# Patient Record
Sex: FEMALE | Race: WHITE | HISPANIC OR LATINO | Employment: FULL TIME | URBAN - METROPOLITAN AREA
[De-identification: names, ages, dates, MRNs, and addresses within clinical notes are randomized per-mention and may not be internally consistent; named-entity substitution may affect disease eponyms.]

---

## 2024-02-06 ENCOUNTER — TRANSCRIBE ORDERS (OUTPATIENT)
Dept: ADMINISTRATIVE | Facility: HOSPITAL | Age: 54
End: 2024-02-06
Payer: COMMERCIAL

## 2024-02-06 ENCOUNTER — HOSPITAL ENCOUNTER (OUTPATIENT)
Dept: GENERAL RADIOLOGY | Facility: HOSPITAL | Age: 54
Discharge: HOME | End: 2024-02-06
Payer: OTHER MISCELLANEOUS

## 2024-02-06 DIAGNOSIS — S63.502A SPRAIN OF FOREARM, LEFT, INITIAL ENCOUNTER: ICD-10-CM

## 2024-02-06 DIAGNOSIS — L08.9 ABRASION OF FOOT, LEFT, INFECTED, INITIAL ENCOUNTER: ICD-10-CM

## 2024-02-06 DIAGNOSIS — M17.12 ARTHRITIS OF LEFT KNEE: ICD-10-CM

## 2024-02-06 DIAGNOSIS — M50.30 DEGENERATION OF CERVICAL INTERVERTEBRAL DISC: ICD-10-CM

## 2024-02-06 DIAGNOSIS — S83.92XA SPRAIN OF LEFT KNEE, INITIAL ENCOUNTER: Primary | ICD-10-CM

## 2024-02-06 DIAGNOSIS — S90.32XA CONTUSION OF LEFT FOOT, INITIAL ENCOUNTER: ICD-10-CM

## 2024-02-06 DIAGNOSIS — S90.812A ABRASION OF FOOT, LEFT, INFECTED, INITIAL ENCOUNTER: ICD-10-CM
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
## 2024-02-06 DIAGNOSIS — S83.92XA SPRAIN OF LEFT KNEE, INITIAL ENCOUNTER: ICD-10-CM

## 2024-02-06 DIAGNOSIS — S60.222A CONTUSION OF LEFT HAND, INITIAL ENCOUNTER: ICD-10-CM

## 2024-02-06 PROCEDURE — 73562 X-RAY EXAM OF KNEE 3: CPT

## 2024-02-06 PROCEDURE — 73110 X-RAY EXAM OF WRIST: CPT

## 2024-02-06 PROCEDURE — 73630 X-RAY EXAM OF FOOT: CPT

## 2024-02-06 PROCEDURE — 72050 X-RAY EXAM NECK SPINE 4/5VWS: CPT

## 2024-02-06 PROCEDURE — 73130 X-RAY EXAM OF HAND: CPT

## 2024-02-06 PROCEDURE — 73610 X-RAY EXAM OF ANKLE: CPT

## 2024-04-29 ENCOUNTER — HOSPITAL ENCOUNTER (OUTPATIENT)
Dept: PHYSICAL THERAPY | Facility: HOSPITAL | Age: 54
Setting detail: THERAPIES SERIES
Discharge: HOME | End: 2024-04-29
Payer: OTHER MISCELLANEOUS

## 2024-04-29 DIAGNOSIS — M25.562 LEFT KNEE PAIN, UNSPECIFIED CHRONICITY: ICD-10-CM

## 2024-04-29 DIAGNOSIS — M54.2 NECK PAIN: Primary | ICD-10-CM

## 2024-04-29 PROCEDURE — 97110 THERAPEUTIC EXERCISES: CPT

## 2024-04-29 PROCEDURE — 97161 PT EVAL LOW COMPLEX 20 MIN: CPT

## 2024-04-29 NOTE — THERAPY EVALUATION
Outpatient Physical Therapy Ortho Initial Evaluation  ALEXANDER Montenegro     Patient Name: Jocelyn Ríos  : 1970  MRN: [id number]  Today's Date: 2024      Visit Date: 2024    Patient Active Problem List   Diagnosis    Anxiety    Moderate persistent asthma without complication    Environmental allergies    Routine adult health maintenance    Complex regional pain syndrome i of left lower limb    Carpal tunnel syndrome on both sides    Status post arthroscopic surgery of left knee, DOS 10/21/2019    Intractable neuropathic pain of left knee    Chronic cervical pain    Chondromalacia of knee, left    Screening mammogram for breast cancer    Moderate mixed hyperlipidemia not requiring statin therapy        Past Medical History:   Diagnosis Date    Acute sinus infection     on poab    Anxiety     Arthritis     Asthma     STARTED @ 41 YRS OLD, DR. BUENO, & hospitals ALLERGY DR. LOPEZ    COVID-19     2021    CRPS (complex regional pain syndrome) type I of lower limb     Elevated cholesterol     Frequent UTI     GERD (gastroesophageal reflux disease)     History of 2019 novel coronavirus disease (COVID-19)     Iliotibial band syndrome affecting left lower leg     Itching     BLE has some scratches to notify MD if doesn't clear up prior to surgery    Kidney stones     Lateral collateral ligament deficiency of left knee 2016    Lateral meniscal tear     Lumbar paraspinal muscle spasm 2017    Meniscal cyst, left 2019    Migraines     Patellofemoral pain syndrome of left knee 2019    PONV (postoperative nausea and vomiting)     Seasonal allergies     Subchondral insufficiency fracture of femoral condyle, left, initial encounter 01/10/2020    Tear of lateral meniscus of left knee, current 2019        Past Surgical History:   Procedure Laterality Date     SECTION      x2    DIAGNOSTIC LAPAROSCOPY      FOOT SURGERY Right     HERNIA REPAIR Bilateral      inguinal hernia age 6    INTRAUTERINE DEVICE INSERTION      KNEE ARTHROSCOPY Left 10/21/2016    Procedure: KNEE ARTHROSCOPY debridement of lateral meniscal tear;  Surgeon: Son Mojica MD;  Location:  [facility];  Service:     KNEE ARTHROSCOPY Left 7/27/2018    Procedure: KNEE ARTHROSCOPY;  Surgeon: Son Mojica MD;  Location:  [facility];  Service: Orthopedics    KNEE ARTHROSCOPY Left 12/2/2021    Procedure: LEFT KNEE DIAGNOSTIC  ARTHROSCOPY AND PARTLATERAL MENISECTOMY AND REMOVAL OF MULTIPLE FIXATION DEVICES;  Surgeon: Rodríguez Hamlin MD;  Location:  [facility];  Service: Orthopedics;  Laterality: Left;    KNEE MENISCAL REPAIR Left 10/21/2019    Procedure: knee arthroscopy, debridement of meniscal cyst, and lateral meniscal repair;  Surgeon: Bridger Figueroa MD;  Location:  [facility];  Service: Orthopedics    NERVE BLOCK Left 5/25/2021    Procedure: KNEE GENICULAR NERVE BLOCK UNDER FLUORO- x2, 3-4 wks apart;  Surgeon: James Spivey MD;  Location: [facility] OR;  Service: Pain Management;  Laterality: Left;       Visit Dx:     ICD-10-CM ICD-9-CM   1. Neck pain  M54.2 723.1   2. Left knee pain, unspecified chronicity  M25.562 719.46          Patient History       Row Name 04/29/24 0900             History    Chief Complaint Difficulty Walking;Difficulty with daily activities;Headache;Numbness;Tinglings;Tightness;Pain;Muscle tenderness;Muscle weakness;Joint swelling  -LN      Type of Pain Knee pain;Neck pain  L knee and L>R neck  -LN      Date Current Problem(s) Began --  1 month ago  -LN      Brief Description of Current Complaint Pt fell at work (turned and slipped on water on floor) about 1 month ago. Pt reports she landed on L knee with knee completely bent under her and she had immediate pain in L knee and it swelled upt. 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 Pt with hx of 4 surgeries L knee= knee scope on 10/21/16, 7/27/18, 10/21/19 (lateral menisical repair), & 12/2/21 (L lateral  "menisectomy and removal of anchors from prior repair). Pt reports hx of a fall in the shower and she hit wall of shower with L side of her head and injured her neck; saw Chiropractor for 2 years and reports most benefit was from mechanical ICTX. She reports her neck had been doing really good until recent fall.  -LN      Previous treatment for THIS PROBLEM Injections;Medication;Surgery;Rehabilitation;Chiropractor  Chiropractor in past for neck x 2 years; reported most relief with mechanical ICTX.  -LN      Surgery Date: 12/02/21  most recent knee surgery- L knee menisectomy and anchors removed from prior repair.  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  -LN      Patient seeing anyone else for problem(s)? Dr. Rodriguez  -LN      How has patient tried to help current problem? ice/MH/Ibuprofen; rest  -LN      What clinical tests have you had for this problem? X-ray;MRI  -LN      Results of Clinical Tests X-ray of neck- Evidence for degenerative change around the C5-6 level; An acute osseous abnormality is not appreciated.  2.Evidence for some degenerative change as noted above.X-ray of L knee= There is no fracture. There is no large joint effusion. No concerning sclerotic or lytic lesions are seen. There is some minimal osteoarthritis. MRI of neck in 2023= At C2-C3, no significant disc degeneration. Mild facet arthropathy. No spinal canal or foraminal compromise.     At C3-C4, no significant disc degeneration. No facet arthropathy. " No spinal canal or foraminal compromise.     At C4-C5, disc desiccation and mild loss of disc height. Mild effacement of the ventral thecal sac. Mild narrowing of the foramina bilaterally. Moderate left facet arthropathy and mild right facet arthropathy.     At C5-C6, disc desiccation and mild loss of disc height. Mild effacement of the ventral thecal sac. Mild facet arthropathy. Mild bilateral foraminal narrowing.     At C6-C7, disc desiccation and mild loss of disc height. Mild facet arthropathy. No spinal canal or foraminal narrowing.     At C7-T1, no significant disc degeneration. Mild facet arthropathy. No spinal canal or foraminal narrowing.     Paravertebral soft tissues demonstrate no acute findings.  -LN      Related/Recent Hospitalizations No  -LN      History of Previous Related Injuries yes- hx of 4 knee surgeries and had a neck injury from a fall in shower 2 years ago.  -LN         Pain     Pain Location Knee;Neck  L knee and neck, L>R side  -LN      Pain at Present 8  -LN      Pain at Best 5  -LN      Pain at Worst 10  -LN      Pain Frequency Constant/continuous  -LN      Pain Description Sharp;Throbbing;Pressure;Aching  occasional sharp pain in L knee  -LN      What Performance Factors Make the Current Problem(s) WORSE? sitting for a long time= neck pain; lifting things; walking and standing; lying down; straightening left knee  -LN      What Performance Factors Make the Current Problem(s) BETTER? ice and heat; Ibuprofen; elevating L arm and resting it on something will help relieve neck pain; bending knee helps relieve neck pain  -LN      Tolerance Time- Standing limited due to L knee pain  -LN      Tolerance Time- Sitting limited; sitting too long causes increased neck pain  -LN      Tolerance Time- Walking limited due to L knee pain  -LN      Is your sleep disturbed? Yes  -LN      What position do you sleep in? Right sidelying;Left sidelying  mostly on R side now  -LN      Difficulties at work?  "still working; reports no issues with work activities- \"I just take my time.\"  -LN      Difficulties with recreational activities? yes; she used to walk 1/5 miles with her dog; but can't do anymore since fall.  -LN         Fall Risk Assessment    Any falls in the past year: Yes  -LN      Number of falls reported in the last 12 months 1  -LN      Factors that contributed to the fall: Slippery surface;Other (comment)  water on floor at work  -LN         Services    Prior Rehab/Home Health Experiences Yes  -LN      When was the prior experience with Rehab/Home Health 2019 and 2021 after L knee surgeries  -LN      Where was the prior experience with Rehab/Home Health [facility]  -LN      Are you currently receiving Home Health services No  -LN      Do you plan to receive Home Health services in the near future No  -LN         Daily Activities    Primary Language English  -LN      Are you able to read Yes  -LN      Are you able to write Yes  -LN      How does patient learn best? Other (comment)  not stated  -LN      Teaching needs identified Home Exercise Program;Other (comment)  Risks and benefits of treatment explained to pt.  -LN      Patient is concerned about/has problems with Climbing Stairs;Performing home management (household chores, shopping, care of dependents);Performing job responsibilities/community activities (work, school,;Transfers (getting out of a chair, bed);Walking;Standing;Bed Mobility;Difficulty with self care (i.e. bathing, dressing, toileting:;Flexibility;Grasping objects lifting;Performing sports, recreation, and play activities;Sitting  -LN      Does patient have problems with the following? Anxiety  -LN      Barriers to learning None  -LN      Pt Participated in POC and Goals Yes  -LN         Safety    Are you being hurt, hit, or frightened by anyone at home or in your life? No  -LN      Are you being neglected by a caregiver No  -LN      Have you had any of the following issues " with Anxiety  -LN                User Key  (r) = Recorded By, (t) = Taken By, (c) = Cosigned By      Initials Name Provider Type    LN Bren Shepherd, PT Physical Therapist                     PT Ortho       Row Name 04/29/24 0900       Subjective    Subjective Comments Pt reports occasional posterior HA and reports occasional N/T in B hands, L>R. Pt does report some relief of neck pain if she elevates her left arm up and rests it on something. She has been using MH/CP at home as needed (alternates). She reports increased popping in L knee since recent fall. She reports having decreased cervical mobility and pain with moving neck.  -LN       Precautions and Contraindications    Precautions/Limitations no known precautions/limitations  -LN       Subjective Pain    Able to rate subjective pain? yes  -LN    Pre-Treatment Pain Level 8  -LN    Subjective Pain Comment neck, L>R side and L knee  -LN       Posture/Observations    Forward Head Mild  -LN    Cervical Lordosis Mild;Decreased;Sitting posture  -LN    Rounded Shoulders Bilateral:;Mild  -LN B shoulders level    Observations Muscle atrophy  mm atrophy noted L Quads and calf  -LN    Posture/Observations Comments Minimal edema noted L knee.  -LN       Cervical Palpation    Occiput Bilateral:;Tender  L>R  -LN Pt is also tender L supraspinatus tendon area and infraspinatus and teres minor.    Suboccipital Bilateral:;Tender;Guarded/taut  L>R  -LN    Cervical Facets Tender  -LN    Scalenes Bilateral:;Tender;Guarded/taut  L>R  -LN    Spinous Process Tender  lefty C4 and C7  -LN    Levator Scapula Bilateral:;Tender;Guarded/taut  -LN    Upper Traps Bilateral:;Tender;Guarded/taut  L>R  -LN    Middle Traps Bilateral:;Tender;Guarded/taut  L>R  -LN    Rhomboids Bilateral:;Tender;Guarded/taut  L>R  -LN       Cervical/Thoracic Special Tests    Cervical Compression (Forarminal Compression vs. Facet Pain) Negative  -LN    Cervical Distraction (Foraminal Compression vs. Facet  Pain) Positive  reports relief  -LN    Bakody/Shoulder Abduction Sign (Cervical Radiculopathy) Bilateral:;Negative  -LN       Knee Palpation    Patella Left:;Tender  -LN    Medial Joint Line Left:;Tender;Swollen  -LN    Lateral Joint Line Left:;Tender;Swollen  -LN    Quads Left:;Tender;Guarded/taut  -LN       Patellar Accessory Motions    Superior glide Left:;WNL  -LN    Inferior glide Left:;WNL;Left pain  significant pain reported  -LN    Medial glide Left:;WNL  -LN    Lateral glide Left:;WNL  -LN       Knee Special Tests    Anterior drawer (ACL lesion) Left:;Negative  -LN    Lachman’s (ACL lesion) Left:;Negative  -LN    Posterior drawer (PCL lesion) Left:;Negative  -LN    Valgus stress (MCL lesion) Left:;Negative  -LN    Varus stress (LCL lesion) Left:;Negative  -LN    Liang’s test (meniscal lesion) Left:;Negative  -LN    Patellar grind test (chondromalacia patella) Left:;Positive  Pain  -LN    Cal’s sign (DVT) Left:;Negative  -LN       General ROM    GENERAL ROM COMMENTS B shoulder/UE AROM WNL; B LE AROM WFL-WNL  -LN       Head/Neck/Trunk    Neck Extension AROM WFL- painful L side  -LN    Neck Flexion AROM WFL- painful on L side  -LN    Neck Lt Lateral Flexion AROM 50%- painful L side  -LN    Neck Rt Lateral Flexion AROM 25%- very painful L side  -LN    Neck Lt Rotation AROM 25%- very painful L side  -LN    Neck Rt Rotation AROM 50%- painful L side  -LN       Left Lower Ext    Lt Knee Extension/Flexion AROM 0-135 degrees  pain in knee with full extension  -LN       MMT (Manual Muscle Testing)    General MMT Comments B shoulder strength 5/5.  -LN       MMT Left Lower Ext    Lt Hip Flexion MMT, Gross Movement (5/5) normal  -LN    Lt Hip Extension MMT, Gross Movement (5/5) normal  -LN    Lt Hip ABduction MMT, Gross Movement (4+/5) good plus  -LN    Lt Hip ADduction MMT, Gross Movement (4/5) good  -LN    Lt Knee Extension MMT, Gross Movement (4+/5) good plus  -LN    Lt Knee Flexion MMT, Gross Movement (4+/5)  good plus  -LN    Lt Ankle Plantarflexion MMT, Gross Movement (5/5) normal  -LN    Lt Ankle Dorsiflexion MMT, Gross Movement (5/5) normal  -LN       Sensation    Sensation WNL? WNL  -LN    Light Touch No apparent deficits  -LN    Additional Comments but does report occasional N/T in B hands, L>R  -LN       Upper Extremity Flexibility    Suboccipitals Right:;Left:;Mildly limited  -LN    Scalenes Left:;Moderately limited;Right:;Mildly limited  -LN    Upper Trapezius Left:;Moderately limited;Right:;Mildly limited  -LN    Levator Scapula Bilateral:;Moderately limited  -LN    Pect Minor Bilateral:;Mildly limited  -LN       Lower Extremity Flexibility    Hamstrings Left:;Mildly limited  -LN    ITB Left:;Mildly limited  -LN    Gastrocnemius Left:;Mildly limited  -LN    Soleus Left:;Mildly limited  -LN       RLE Quick Girth (cm)    Largest calf 30 cm  -LN    Tib tuberosity 30.8 cm  -LN    Mid patella 32 cm  -LN    Distal thigh 35.5 cm  -LN    RLE Quick Girth Total 128.3  -LN       LLE Quick Girth (cm)    Largest calf 29.5 cm  -LN    Tib tuberosity 29.4 cm  -LN    Mid patella 31.9 cm  -LN    Distal thigh 32 cm  -LN       Transfers    Sit-Stand [place] (Transfers) independent  -LN    Stand-Sit [place] (Transfers) independent  -LN    Transfers, Sit-Stand-Sit, Assist Device other (see comments)  no AD used  -LN       Gait/Stairs (Locomotion)    [place] Level (Gait) independent  -LN    Assistive Device (Gait) other (see comments)  no AD used  -LN    Deviations/Abnormal Patterns (Gait) left sided deviations;antalgic;stride length decreased  -LN    Comment, (Gait/Stairs) Patient ambulates with nominal to minimal antalgic gait L with decreased stride length on L.  -LN              User Key  (r) = Recorded By, (t) = Taken By, (c) = Cosigned By      Initials Name Provider Type    Bren Rodriguez, PT Physical Therapist                                Therapy Education  Education Details: Pt to work on HEP 1-2  x day and use MH/CP PRN. Pt to avoid any heavy lifting at this time.  Given: HEP, Symptoms/condition management, Pain management, Posture/body mechanics, Edema management  Program: New  How Provided: Verbal, Demonstration, Written  Provided to: Patient  Level of Understanding: Teach back education performed, Verbalized, Demonstrated      PT OP Goals       Row Name 04/29/24 0900          PT Short Term Goals    STG Date to Achieve 05/13/24  -LN     STG 1 Pt to verbally report decreased neck pain to <6/10 with ADLs and everyday activities.  -LN     STG 2 Pt to verbally report decreased L knee pain to <6/10 with ADLs and everyday activities.  -LN     STG 3 Pt independent with initial HEP issued by therapist.  -LN     STG 4 CROM improved by 25% to allow her to turn her neck better with driving.  -LN     STG 5 Muscle guarding decreased to minimal B UT/levator/MT/rhomboids/scalenes.  -LN     STG 6 Posterior cervical HA decreased by 25%-50%.  -LN     STG 7 Episodes of B Hand N/T decreased by 25%-50%.  -LN        Long Term Goals    LTG Date to Achieve 05/27/24  -LN     LTG 1 Pt to verbally report decreased neck pain to <3/10 with ADLs and everyday activities.  -LN     LTG 2 Pt to verbally report decreased L knee pain to <3/10 with ADLs and everyday activities.  -LN     LTG 3 CROM WFL & painfree all planes.  -LN     LTG 4 L hip and knee strength improved to 5/5.  -LN     LTG 5 No limp noted with ambulation.  -LN     LTG 6 Muscle guarding decreased to nominal B UT/levator/MT/rhomboids/scalenes.  -LN     LTG 7 Pt to no longer report any N/T in hands.  -LN     LTG 8 Posterior cervical HA decreased by 50%-75%.  -LN     LTG 9 Improved mm girth by 2-3 cm in L Quads and by 0.5cm in L calf.  -LN        Time Calculation    PT Goal Re-Cert Due Date 05/27/24  -LN               User Key  (r) = Recorded By, (t) = Taken By, (c) = Cosigned By      Initials Name Provider Type    Bren Rodriguez, PT Physical Therapist                      PT Assessment/Plan       Row Name 04/29/24 0900          PT Assessment    Functional Limitations Impaired gait;Impaired locomotion;Limitation in home management;Limitations in community activities;Limitations in functional capacity and performance;Performance in leisure activities;Performance in self-care ADL;Performance in sport activities  -LN     Impairments Edema;Gait;Impaired flexibility;Locomotion;Muscle strength;Pain;Posture;Range of motion;Sensation  -LN     Assessment Comments Pt presents with hx of 4 L knee surgeries (most recent was in 2021) and hx of neck injury after a fall in her shower 2 yrs ago with recent fall at work about 1 month ago (she slipped on some water on the floor) and injured L knee (fell onto knee in bent postion) and injured neck as well. She reports both her neck and knee had been doing good up until recent fall. Pt presents with decreased CROM, decreased L knee/hip strength; good knee ROM, but pain in full extension; decreased flexibility in neck/B UT and L LE; minimal edema L knee; decreased mm tone (atrophy) noted in L Quads and calf; pt with decreased tolerance to sitting, standing, walking, bed mobility and reports disturbed sleep and difficulty lying on L side. Pt with decreased tolerance to home/community and leisure activities due to above. Pt with sx of cervical strain with L sided facet joint syndrome and L knee strain with possible menisus injury.  -LN     Please refer to paper survey for additional self-reported information Yes  -LN     Rehab Potential Good  -LN     Patient/caregiver participated in establishment of treatment plan and goals Yes  -LN     Patient would benefit from skilled therapy intervention Yes  -LN        PT Plan    PT Frequency 2x/week  -LN     Predicted Duration of Therapy Intervention (PT) 3 weeks (6 visits)  -LN     Planned CPT's? PT EVAL LOW COMPLEXITY: 08331;PT RE-EVAL: 57254;PT THER PROC EA 15 MIN: 03951;PT MANUAL THERAPY EA 15 MIN:  "05267;PT GAIT TRAINING EA 15 MIN: 53747;PT HOT OR COLD PACK TREAT MCARE;PT ELECTRICAL STIM UNATTEND: ;PT ULTRASOUND EA 15 MIN: 84425;PT TRACTION CERVICAL: 31791  -LN     Physical Therapy Interventions (Optional Details) home exercise program;gait training;manual therapy techniques;modalities;patient/family education;postural re-education;ROM (Range of Motion);strengthening;stretching;taping;dry needling  -LN     PT Plan Comments See pt 2 x week for therapeutic exercises with HEP; modalities PRN (IFC/MH/CP/US);manual therapy, including gentle manual cervical traction; trial of mechanical ICTX; pt and posture education; kinesiotape PRN (assess benefit of kinesiotape on L knee); consider trial of dry needling.  -LN               User Key  (r) = Recorded By, (t) = Taken By, (c) = Cosigned By      Initials Name Provider Type    LN Bren Shepherd, PT Physical Therapist                     Modalities       Row Name 04/29/24 0900             Moist Heat    MH Applied Yes  -LN      Location Cervical spine with pt supine.  -LN      PT Moist Heat Minutes 10  -LN      MH S/P Rx Yes  -LN         Other Treatment Provided    Taping / Bracing Trial of kinesiotape done today using 1 \"I\" strip beginning below lateral knee and brought around patella and ended on lateral distal thigh and 1 \"I\" strip begun below medial knee and brought around patella and ended on medial distal thigh and 1 smaller \"I\" strip for spatial correction below knee. Pt instructed in use and care of kinesiotape, including safe removal. Pt to leave tape on up to 5 days and to trim/remove tape as needed.  -LN                User Key  (r) = Recorded By, (t) = Taken By, (c) = Cosigned By      Initials Name Provider Type    LN Bren Shepherd, PT Physical Therapist                   OP Exercises       Row Name 04/29/24 0900             Precautions    Existing Precautions/Restrictions no known precautions/restrictions  -LN         Subjective    " Subjective Comments Pt reports occasional posterior HA and reports occasional N/T in B hands, L>R. Pt does report some relief of neck pain if she elevates her left arm up and rests it on something. She has been using MH/CP at home as needed (alternates). She reports increased popping in L knee since recent fall. She reports having decreased cervical mobility and pain with moving neck.  -LN         Subjective Pain    Able to rate subjective pain? yes  -LN      Pre-Treatment Pain Level 8  -LN      Subjective Pain Comment neck, L>R side and L knee  -LN         Total Minutes    05733 - PT Therapeutic Exercise Minutes 10  -LN      68462 - PT Manual Therapy Minutes 8  -LN         Exercise 1    Exercise Name 1 QS over single towel roll  -LN      Cueing 1 Verbal;Tactile;Demo  -LN      Reps 1 10  -LN      Time 1 5 sec  -LN         Exercise 2    Exercise Name 2 SAQ ball squeeze  -LN      Cueing 2 Verbal;Tactile;Demo  -LN      Reps 2 10  -LN      Time 2 3-5 sec  -LN         Exercise 3    Exercise Name 3 SLR  -LN      Cueing 3 Verbal;Tactile;Demo  -LN      Reps 3 10  -LN      Time 3 2 sec  -LN         Exercise 4    Exercise Name 4 sidelying hip abduction  -LN      Cueing 4 Verbal  -LN      Additional Comments pt to do 10 reps at home  -LN         Exercise 5    Exercise Name 5 sidelying hip adduction  -LN      Cueing 5 Verbal  -LN      Additional Comments pt to do 10 reps at home  -LN         Exercise 6    Exercise Name 6 Active cervical SB/UT stretch  -LN      Cueing 6 Verbal;Tactile;Demo  -LN      Reps 6 3 each side  -LN      Time 6 5 sec  -LN         Exercise 7    Exercise Name 7 Levator scapula stretch  -LN      Cueing 7 Verbal;Tactile;Demo  -LN      Reps 7 3 each side  -LN      Time 7 5 sec  -LN         Exercise 8    Exercise Name 8 Scapular retraction  -LN      Cueing 8 Verbal;Tactile;Demo  -LN      Reps 8 5  -LN      Time 8 10 sec  -LN                User Key  (r) = Recorded By, (t) = Taken By, (c) = Cosigned By       Initials Name Provider Type    Bren Rodriguez, PT Physical Therapist                  Manual Rx (Last 36 Hours)       Manual Treatments       Row Name 04/29/24 0900             Total Minutes    01340 - PT Manual Therapy Minutes 8  -LN         Manual Rx 1    Manual Rx 1 Location SOR  -LN      Manual Rx 1 Duration 2 minutes; 1 minute  -LN         Manual Rx 2    Manual Rx 2 Location gentle manual CTX  -LN      Manual Rx 2 Duration 12 x 10-20 sec  -LN         Manual Rx 3    Manual Rx 3 Location gentle passive cervical flexion  -LN      Manual Rx 3 Duration 3 x 10 sec each  -LN         Manual Rx 4    Manual Rx 4 Location gentle passive levator scapula stretch  -LN      Manual Rx 4 Duration 3 x 10 sec each  -LN         Manual Rx 5    Manual Rx 5 Location gentle passive UT stretch  -LN      Manual Rx 5 Duration 3 x 10 sec each  -LN         Manual Rx 6    Manual Rx 6 Location supine cervical massage and cervical glides  -LN      Manual Rx 6 Duration 2 minutes  -LN                User Key  (r) = Recorded By, (t) = Taken By, (c) = Cosigned By      Initials Name Provider Type    Bren Rodriguez, PT Physical Therapist                                Outcome Measure Options: Lower Extremity Functional Scale (LEFS), Neck Disability Index (NDI)  Lower Extremity Functional Index  Any of your usual work, housework or school activities: Quite a bit of difficulty  Your usual hobbies, recreational or sporting activities: Quite a bit of difficulty  Getting into or out of the bath: Quite a bit of difficulty  Walking between rooms: No difficulty  Putting on your shoes or socks: No difficulty  Squatting: Quite a bit of difficulty  Lifting an object, like a bag of groceries from the floor: A little bit of difficulty  Performing light activities around your home: Moderate difficulty  Performing heavy activities around your home: Quite a bit of difficulty  Getting into or out of a car: A little bit of difficulty  Walking  2 blocks: No difficulty  Walking a mile: Extreme difficulty or unable to perform activity  Going up or down 10 stairs (about 1 flight of stairs): Quite a bit of difficulty  Standing for 1 hour: Extreme difficulty or unable to perform activity  Sitting for 1 hour: Extreme difficulty or unable to perform activity  Running on even ground: Extreme difficulty or unable to perform activity  Running on uneven ground: Extreme difficulty or unable to perform activity  Making sharp turns while running fast: Quite a bit of difficulty  Hopping: Extreme difficulty or unable to perform activity  Rolling over in bed: Moderate difficulty  Total: 29  Lower Extremity Functional Index  Any of your usual work, housework or school activities: Quite a bit of difficulty  Your usual hobbies, recreational or sporting activities: Quite a bit of difficulty  Getting into or out of the bath: Quite a bit of difficulty  Walking between rooms: No difficulty  Putting on your shoes or socks: No difficulty  Squatting: Quite a bit of difficulty  Lifting an object, like a bag of groceries from the floor: A little bit of difficulty  Performing light activities around your home: Moderate difficulty  Performing heavy activities around your home: Quite a bit of difficulty  Getting into or out of a car: A little bit of difficulty  Walking 2 blocks: No difficulty  Walking a mile: Extreme difficulty or unable to perform activity  Going up or down 10 stairs (about 1 flight of stairs): Quite a bit of difficulty  Standing for 1 hour: Extreme difficulty or unable to perform activity  Sitting for 1 hour: Extreme difficulty or unable to perform activity  Running on even ground: Extreme difficulty or unable to perform activity  Running on uneven ground: Extreme difficulty or unable to perform activity  Making sharp turns while running fast: Quite a bit of difficulty  Hopping: Extreme difficulty or unable to perform activity  Rolling over in bed: Moderate  difficulty  Total: 29  Neck Disability Index  Section 1 - Pain Intensity: The pain is moderate and does not vary much.  Section 2 - Personal Care: I can look after myself normally, but it causes extra pain.  Section 3 - Lifting: I can lift very light weights.  Section 4 - Reading: I cannot read as much as I want because of moderate neck pain.  Section 5 - Headaches: I have moderate headaches that come infrequently.  Section 6 - Concentration: I have a fair degree of difficulty in concentrating when I want to.  Section 7 - Work: I can do most of my usual work, but no more  Section 8 - Driving: I cannot drive my car as long as I want because of moderate neck pain.  Section 9 - Sleeping: My sleep is moderately disturbed (2-3 hours sleepless).  Section 10 - Recreation: I am able to engage in a few of my usual recreational activities because of pain in my neck.  Neck Disability Index Score: 26      Time Calculation:     Start Time: 0900  Stop Time: 1020  Time Calculation (min): 80 min  Timed Charges  69630 - PT Therapeutic Exercise Minutes: 10  58751 - PT Manual Therapy Minutes: 8  Untimed Charges  PT Moist Heat Minutes: 10  Total Minutes  Timed Charges Total Minutes: 18  Untimed Charges Total Minutes: 10   Total Minutes: 28     Therapy Charges for Today       Code Description Service Date Service Provider Modifiers Qty    68984038444 HC PT THER PROC EA 15 MIN 4/29/2024 Bren Shepherd, PT GP 1    95231120282 HC PT EVAL LOW COMPLEXITY 4 4/29/2024 Bren Shepherd, PT GP 1            PT G-Codes  Outcome Measure Options: Lower Extremity Functional Scale (LEFS), Neck Disability Index (NDI)  Total: 29  Neck Disability Index Score: 26         Bren Shepherd, PT  4/29/2024

## 2024-05-02 ENCOUNTER — HOSPITAL ENCOUNTER (OUTPATIENT)
Dept: PHYSICAL THERAPY | Facility: HOSPITAL | Age: 54
Setting detail: THERAPIES SERIES
Discharge: HOME | End: 2024-05-02
Payer: OTHER MISCELLANEOUS

## 2024-05-02 DIAGNOSIS — M25.562 LEFT KNEE PAIN, UNSPECIFIED CHRONICITY: ICD-10-CM

## 2024-05-02 DIAGNOSIS — M54.2 NECK PAIN: Primary | ICD-10-CM

## 2024-05-02 PROCEDURE — 97110 THERAPEUTIC EXERCISES: CPT

## 2024-05-02 PROCEDURE — 97012 MECHANICAL TRACTION THERAPY: CPT

## 2024-05-02 NOTE — THERAPY TREATMENT NOTE
Outpatient Physical Therapy Ortho Treatment Note  [facility]     Patient Name: Jocelyn Ríos  : 1970  MRN: [id number]  Today's Date: 2024      Visit Date: 2024    Visit Dx:    ICD-10-CM ICD-9-CM   1. Neck pain  M54.2 723.1   2. Left knee pain, unspecified chronicity  M25.562 719.46       Patient Active Problem List   Diagnosis    Anxiety    Moderate persistent asthma without complication    Environmental allergies    Routine adult health maintenance    Complex regional pain syndrome i of left lower limb    Carpal tunnel syndrome on both sides    Status post arthroscopic surgery of left knee, DOS 10/21/2019    Intractable neuropathic pain of left knee    Chronic cervical pain    Chondromalacia of knee, left    Screening mammogram for breast cancer    Moderate mixed hyperlipidemia not requiring statin therapy        Past Medical History:   Diagnosis Date    Acute sinus infection     on poab    Anxiety     Arthritis     Asthma     STARTED @ 41 YRS OLD, DR. BUENO, & [facility] ALLERGY DR. LOPEZ    COVID-19     2021    CRPS (complex regional pain syndrome) type I of lower limb     Elevated cholesterol     Frequent UTI     GERD (gastroesophageal reflux disease)     History of 2019 novel coronavirus disease (COVID-19)     Iliotibial band syndrome affecting left lower leg     Itching     BLE has some scratches to notify MD if doesn't clear up prior to surgery    Kidney stones     Lateral collateral ligament deficiency of left knee 2016    Lateral meniscal tear     Lumbar paraspinal muscle spasm 2017    Meniscal cyst, left 2019    Migraines     Patellofemoral pain syndrome of left knee 2019    PONV (postoperative nausea and vomiting)     Seasonal allergies     Subchondral insufficiency fracture of femoral condyle, left, initial encounter 01/10/2020    Tear of lateral meniscus of left knee, current 2019        Past Surgical History:   Procedure Laterality  Health Maintenance Due   Topic Date Due   • Diabetes Eye Exam  03/05/2020       Patient is due for topics as listed above but is not proceeding with Diabetes Eye Exam at this time. Pt states had eye exam completed January 2020 at [facility].  Called to [facility] and diabetic eye exam was not completed.  Pt states will be getting her next eye exam in January 2021.         "Date     SECTION      x2    DIAGNOSTIC LAPAROSCOPY      FOOT SURGERY Right     HERNIA REPAIR Bilateral     inguinal hernia age 6    INTRAUTERINE DEVICE INSERTION      KNEE ARTHROSCOPY Left 10/21/2016    Procedure: KNEE ARTHROSCOPY debridement of lateral meniscal tear;  Surgeon: Son Mojica MD;  Location:  [facility];  Service:     KNEE ARTHROSCOPY Left 2018    Procedure: KNEE ARTHROSCOPY;  Surgeon: Son Mojica MD;  Location:  [facility];  Service: Orthopedics    KNEE ARTHROSCOPY Left 2021    Procedure: LEFT KNEE DIAGNOSTIC  ARTHROSCOPY AND PARTLATERAL MENISECTOMY AND REMOVAL OF MULTIPLE FIXATION DEVICES;  Surgeon: Rodríguez Hamlin MD;  Location:  [facility];  Service: Orthopedics;  Laterality: Left;    KNEE MENISCAL REPAIR Left 10/21/2019    Procedure: knee arthroscopy, debridement of meniscal cyst, and lateral meniscal repair;  Surgeon: Bridger Figueroa MD;  Location:  [facility];  Service: Orthopedics    NERVE BLOCK Left 2021    Procedure: KNEE GENICULAR NERVE BLOCK UNDER FLUORO- x2, 3-4 wks apart;  Surgeon: James Spivey MD;  Location: [facility];  Service: Pain Management;  Laterality: Left;        PT Ortho       Row Name 24 1500       Subjective    Subjective Comments \"I am sore.\" Pt has done her exercises with some soreness.\" \"I have been on my feet a lot today so I can feel it.\"  -LN       Precautions and Contraindications    Precautions/Limitations no known precautions/limitations  -LN       Subjective Pain    Able to rate subjective pain? yes  -LN    Pre-Treatment Pain Level 7  -LN    Subjective Pain Comment neck and L knee  -LN              User Key  (r) = Recorded By, (t) = Taken By, (c) = Cosigned By      Initials Name Provider Type    LN Bren Shepherd, PT Physical Therapist                                 PT Assessment/Plan       Row Name 24 1700          PT Assessment    Assessment Comments Pt continues with peristent pain in neck and L knee; " "increased knee pain after being on her feet all day at work today. She tolerated exercises well and tolerated ICTX well with reports of her neck feeling better after traction.  -LN        PT Plan    PT Frequency 1x/week;2x/week  -LN     PT Plan Comments Continue per POC. Progress exercises as tolerated. Modalities PRN. Assess benefit of ICTX and increase weight and time as tolerated.  Continue to assess benefit of kinesiotape on L knee. Add TKE vs TB as tolerated next visit for L knee.  -LN               User Key  (r) = Recorded By, (t) = Taken By, (c) = Cosigned By      Initials Name Provider Type    Bren Rodriguez, PT Physical Therapist                     Modalities       Row Name 05/02/24 1500             Moist Heat    MH Applied Yes  -LN      Location Cervical spine & L knee with pt supine in hooklying.  -LN      PT Moist Heat Minutes 15  -LN      MH S/P Rx Yes  -LN         Traction 45750    Traction Type Cervical  with MH across shoulders  -LN      PT Traction Rx Minutes 10  -LN      Duration Intermittent  -LN      Position Hook-lying  -LN      Weight 10  -LN      Hold 60  -LN      Relax 20  -LN         Other Treatment Provided    Taping / Bracing Kinesiotape removed and reapplied today using 1 \"I\" strip beginning below lateral knee and brought around patella and ended on lateral distal thigh and 1 \"I\" strip begun below medial knee and brought around patella and ended on medial distal thigh and 1 smaller \"I\" strip for spatial correction below knee. Pt instructed in use and care of kinesiotape, including safe removal. Pt to leave tape on up to 5 days and to trim/remove tape as needed.  -LN                User Key  (r) = Recorded By, (t) = Taken By, (c) = Cosigned By      Initials Name Provider Type    Bren Rodriguez, PT Physical Therapist                   OP Exercises       Row Name 05/02/24 1500             Precautions    Existing Precautions/Restrictions no known " "precautions/restrictions  -LN         Subjective    Subjective Comments \"I am sore.\" Pt has done her exercises with some soreness.\" \"I have been on my feet a lot today so I can feel it.\"  -LN         Subjective Pain    Able to rate subjective pain? yes  -LN      Pre-Treatment Pain Level 7  -LN      Subjective Pain Comment neck and L knee  -LN         Total Minutes    88647 - PT Therapeutic Exercise Minutes 15  -LN         Exercise 1    Exercise Name 1 QS over single towel roll  -LN      Cueing 1 Verbal;Tactile;Demo  -LN      Reps 1 10  -LN      Time 1 5 sec  -LN         Exercise 2    Exercise Name 2 SAQ ball squeeze  -LN      Cueing 2 Verbal;Tactile;Demo  -LN      Reps 2 10  -LN      Time 2 3-5 sec  -LN         Exercise 3    Exercise Name 3 SLR  -LN      Cueing 3 Verbal;Tactile;Demo  -LN      Reps 3 10  -LN      Time 3 2 sec  -LN         Exercise 4    Exercise Name 4 sidelying hip abduction  -LN      Cueing 4 Verbal;Tactile;Demo  -LN      Reps 4 10  -LN      Time 4 2 sec  -LN         Exercise 5    Exercise Name 5 sidelying hip adduction  -LN      Cueing 5 Verbal;Tactile;Demo  -LN      Reps 5 10  -LN      Time 5 2 sec  -LN         Exercise 6    Exercise Name 6 Active cervical SB/UT stretch  -LN      Cueing 6 Verbal;Tactile;Demo  -LN      Reps 6 --  -LN      Time 6 --  -LN      Additional Comments HEP  -LN         Exercise 7    Exercise Name 7 Levator scapula stretch  -LN      Cueing 7 Verbal;Tactile;Demo  -LN      Reps 7 --  -LN      Time 7 --  -LN      Additional Comments HEP  -LN         Exercise 8    Exercise Name 8 Scapular retraction  -LN      Cueing 8 Verbal;Tactile;Demo  -LN      Reps 8 --  -LN      Time 8 --  -LN      Additional Comments HEP  -LN         Exercise 9    Exercise Name 9 hooklying ball squeeze  -LN      Cueing 9 Verbal;Tactile;Demo  -LN      Reps 9 10  -LN      Time 9 5 sec  -LN         Exercise 10    Exercise Name 10 heel raises  -LN      Cueing 10 Verbal  -LN      Reps 10 20  -LN                " User Key  (r) = Recorded By, (t) = Taken By, (c) = Cosigned By      Initials Name Provider Type    Bren Rodriguez, PT Physical Therapist                                     Therapy Education  Education Details: Pt to add  hooklying ball squeeze and standing calf raises to HEP as tolerated. Pt to use MH/CP PRN.  Given: HEP, Symptoms/condition management, Pain management, Posture/body mechanics, Edema management  Program: New, Reinforced, Progressed (added hooklying ball squeeze and standing calf raises)  How Provided: Verbal, Demonstration, Written  Provided to: Patient  Level of Understanding: Teach back education performed, Verbalized, Demonstrated              Time Calculation:   Start Time: 1600  Stop Time: 1710  Time Calculation (min): 70 min  Timed Charges  02459 - PT Therapeutic Exercise Minutes: 15  Untimed Charges  PT Traction Rx Minutes: 10  PT Moist Heat Minutes: 15  Total Minutes  Timed Charges Total Minutes: 15  Untimed Charges Total Minutes: 25   Total Minutes: 40  Therapy Charges for Today       Code Description Service Date Service Provider Modifiers Qty    72390159254 HC PT THER PROC EA 15 MIN 5/2/2024 Bren Shepherd, PT GP 1    83341442211 HC PT TRACTION CERVICAL 5/2/2024 Bren Shepherd, PT GP 1                      Bren Shepherd, PT  5/2/2024

## 2024-05-03 ENCOUNTER — HOSPITAL ENCOUNTER (OUTPATIENT)
Dept: OCCUPATIONAL THERAPY | Facility: HOSPITAL | Age: 54
Setting detail: THERAPIES SERIES
Discharge: HOME | End: 2024-05-03
Payer: OTHER MISCELLANEOUS

## 2024-05-03 DIAGNOSIS — M25.532 LEFT WRIST PAIN: ICD-10-CM

## 2024-05-03 DIAGNOSIS — M79.642 LEFT HAND PAIN: Primary | ICD-10-CM

## 2024-05-03 PROCEDURE — 97165 OT EVAL LOW COMPLEX 30 MIN: CPT

## 2024-05-03 NOTE — THERAPY EVALUATION
Outpatient Occupational Therapy Ortho Initial Evaluation  ALEXANDER Montenegro     Patient Name: Jocelyn Ríos  : 1970  MRN: [id number]  Today's Date: 5/3/2024      Visit Date: 2024    Patient Active Problem List   Diagnosis    Anxiety    Moderate persistent asthma without complication    Environmental allergies    Routine adult health maintenance    Complex regional pain syndrome i of left lower limb    Carpal tunnel syndrome on both sides    Status post arthroscopic surgery of left knee, DOS 10/21/2019    Intractable neuropathic pain of left knee    Chronic cervical pain    Chondromalacia of knee, left    Screening mammogram for breast cancer    Moderate mixed hyperlipidemia not requiring statin therapy        Past Medical History:   Diagnosis Date    Acute sinus infection     on poab    Anxiety     Arthritis     Asthma     STARTED @ 41 YRS OLD, DR. BUENO, & [facility] ALLERGY DR. LOPEZ    COVID-19     2021    CRPS (complex regional pain syndrome) type I of lower limb     Elevated cholesterol     Frequent UTI     GERD (gastroesophageal reflux disease)     History of 2019 novel coronavirus disease (COVID-19)     Iliotibial band syndrome affecting left lower leg     Itching     BLE has some scratches to notify MD if doesn't clear up prior to surgery    Kidney stones     Lateral collateral ligament deficiency of left knee 2016    Lateral meniscal tear     Lumbar paraspinal muscle spasm 2017    Meniscal cyst, left 2019    Migraines     Patellofemoral pain syndrome of left knee 2019    PONV (postoperative nausea and vomiting)     Seasonal allergies     Subchondral insufficiency fracture of femoral condyle, left, initial encounter 01/10/2020    Tear of lateral meniscus of left knee, current 2019        Past Surgical History:   Procedure Laterality Date     SECTION      x2    DIAGNOSTIC LAPAROSCOPY      FOOT SURGERY Right     HERNIA REPAIR Bilateral      "inguinal hernia age 6    INTRAUTERINE DEVICE INSERTION      KNEE ARTHROSCOPY Left 10/21/2016    Procedure: KNEE ARTHROSCOPY debridement of lateral meniscal tear;  Surgeon: Son Mojica MD;  Location:  [facility];  Service:     KNEE ARTHROSCOPY Left 7/27/2018    Procedure: KNEE ARTHROSCOPY;  Surgeon: Son Mojica MD;  Location:  [facility];  Service: Orthopedics    KNEE ARTHROSCOPY Left 12/2/2021    Procedure: LEFT KNEE DIAGNOSTIC  ARTHROSCOPY AND PARTLATERAL MENISECTOMY AND REMOVAL OF MULTIPLE FIXATION DEVICES;  Surgeon: Rodríguez Hamlin MD;  Location:  [facility];  Service: Orthopedics;  Laterality: Left;    KNEE MENISCAL REPAIR Left 10/21/2019    Procedure: knee arthroscopy, debridement of meniscal cyst, and lateral meniscal repair;  Surgeon: Bridger Figueroa MD;  Location:  [facility];  Service: Orthopedics    NERVE BLOCK Left 5/25/2021    Procedure: KNEE GENICULAR NERVE BLOCK UNDER FLUORO- x2, 3-4 wks apart;  Surgeon: James Spivey MD;  Location: [facility] OR;  Service: Pain Management;  Laterality: Left;         Visit Dx:    ICD-10-CM ICD-9-CM   1. Left hand pain  M79.642 729.5   2. Left wrist pain  M25.532 719.43        Patient History       Row Name 05/03/24 1500             History    Chief Complaint Difficulty with daily activities;Numbness;Tinglings;Swelling;Pain;Muscle weakness  -EN      Type of Pain Hand pain;Wrist pain  -EN      Date Current Problem(s) Began --  Counts include 234 beds at the [facility] 4/1/24  -EN      Brief Description of Current Complaint Patient slipped on water at work and fell landing on her L knee and wrist and jarring her neck. Patient is currently in physical therapy to address the neck and knee pain. Patient reports pain at first dorsal compartement wrapping around to thenar eminence and pain also at ulnar wrist. Patient reports pain with opening jars, lifting, cutting paper, etc..Patient states she occasionally drops items when she has a sudden pain. 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 Patient enjoys kayak, swimming, walking and spending time with grandkids.  -EN      Patient seeing anyone else for problem(s)? Dr. Rodriguez  -EN      How has patient tried to help current problem? ice/heat, ibuprofen, rest, brace  -EN      What clinical tests have you had for this problem? X-ray  -EN      Results of Clinical Tests no fractures  -EN      History of Previous Related Injuries neck injury 2 years ago from fall in shower  -EN         Pain     Pain Location Hand;Wrist  -EN      Pain at Present --  3-4/10  -EN      Pain at Best 3  -EN      Pain at Worst 10  -EN      Pain Frequency Constant/continuous  -EN      Pain Description Sharp;Throbbing  -EN      What Performance Factors Make the Current Problem(s) WORSE? lifting, opening jars, cutting  -EN      What Performance Factors Make the Current Problem(s) BETTER? heat, rest  -EN      Is your sleep disturbed? Yes  -EN      Total hours of sleep per night 3  wakes up after approximately 3 hours due to pain in either wrist, neck or knee  -EN      What position do you sleep in? --  sidelying  -EN      Difficulties with ADL's? carrying things, opening containers/jars  -EN      Difficulties with recreational activities? unable to walk great sang due to size of dog/holding leash  -EN         Fall Risk Assessment    Any falls in the past year: Yes  -EN      Number of falls reported in the last 12 months " 1  -EN      Factors that contributed to the fall: Slippery surface  -EN         Services    Do you plan to receive Home Health services in the near future No  -EN         Daily Activities    Primary Language English  -EN      Are you able to read Yes  -EN      Are you able to write Yes  -EN      Teaching needs identified Home Exercise Program;Management of Condition  -EN      Patient is concerned about/has problems with Difficulty with self care (i.e. bathing, dressing, toileting:;Grasping objects lifting;Performing home management (household chores, shopping, care of dependents);Performing job responsibilities/community activities (work, school,;Performing sports, recreation, and play activities  -EN      Does patient have problems with the following? Anxiety  -EN      Barriers to learning None  -EN      Pt Participated in POC and Goals Yes  -EN         Safety    Are you being hurt, hit, or frightened by anyone at home or in your life? No  -EN      Are you being neglected by a caregiver No  -EN      Have you had any of the following issues with Anxiety  -EN                User Key  (r) = Recorded By, (t) = Taken By, (c) = Cosigned By      Initials Name Provider Type    Natalia Bettencourt OTR Occupational Therapist                     OT Ortho       Row Name 05/03/24 1300             Posture/Observations    Posture/Observations Comments patient with mild edema left wrist, pain with palpation at first dorsal comparment, thenar eminence, and ulnar wrist distal to pisiform  -EN         Sensation    Light Touch No apparent deficits  reports occasional numbness/tingling  -EN      Monofilaments Tested? Green  -EN      Green Monofilament Interpretation Normal  -EN      Green Monofilament Results intact throughout left hand  -EN         Body Part    Body Part Wrist/Hand  -EN         Digit ROM    Digit ROM --  B Hand AROM WNL  -EN         Pinch Strength    Affected Side Left  -EN         Right Hand Strength - Pinch  (lbs)    Lateral 13 lbs  -EN         Wrist/Hand Special Tests    Finklestein test (DeQuervain’s syndrome) Left:;Positive  -EN      Triangular fibrocartilage complex compression  Left:;Positive  -EN         General ROM    RT Upper Ext Rt Elbow Supination;Rt Elbow Pronation;Rt Wrist Flexion;Rt Wrist Extension;Rt Ulnar Deviation;Rt Radial Deviation  R UE AROM WNL throughout  -EN      LT Upper Ext Lt Elbow Extension/Flexion;Lt Elbow Supination;Lt Elbow Pronation;Lt Wrist Flexion;Lt Wrist Extension;Lt Ulnar Deviation;Lt Radial Deviation  -EN         Right Upper Ext    Rt Elbow Supination AROM 90  -EN      Rt Elbow Pronation AROM 88  -EN      Rt Wrist Flexion AROM 95  -EN      Rt Wrist Extension AROM 75  -EN      Rt  Ulnar Deviation AROM 38  -EN      Rt  Radial Deviation AROM 31  -EN         Left Upper Ext    Lt Elbow Extension/Flexion AROM wnl  -EN      Lt Elbow Supination AROM 90  -EN      Lt Elbow Pronation AROM 88  -EN      Lt Wrist Flexion AROM 95  -EN      Lt Wrist Extension AROM 74  -EN      Lt  Ulnar Deviation AROM 37  -EN      Lt  Radial Deviation AROM 30  -EN         MMT (Manual Muscle Testing)    Rt Upper Ext --  R UE WFL  -EN      Lt Upper Ext Lt Elbow Extension;Lt Elbow Flexion;Lt Forearm Supination;Lt Forearm Pronation;Lt Wrist Flexion;Lt Wrist Extension;Comments  -EN         MMT Left Upper Ext    Lt Elbow Flexion MMT, Gross Movement (5/5) normal  -EN      Lt Elbow Extension MMT, Gross Movement (5/5) normal  -EN      Lt Forearm Supination MMT, Gross Movement (5/5) normal  -EN      Lt Forearm Pronation MMT, Gross Movement (5/5) normal  -EN      Lt Wrist Flexion MMT, Gross Movement (4+/5) good plus  -EN      Lt Wrist Extension MMT, Gross Movement (4+/5) good plus  with pain  -EN      Lt Upper Extremity Comments  UD 4/5, RD 4/5 with pain  -EN         Girth    Girth Measured? Right Upper Extremity;Left Upper Extremity  -EN         RUE Edema - Circumference (cm)    Wrist Crease 14.4 cm  -EN         LUE Edema -  Circumference (cm)    Wrist Crease 14.5 cm  -EN                User Key  (r) = Recorded By, (t) = Taken By, (c) = Cosigned By      Initials Name Provider Type    Natalia Bettencourt OTR Occupational Therapist                    Hand Therapy (Last 24 Hours)       Hand Eval       Row Name 05/03/24 1300             Hand  Strength     Strength Affected Side Left  -EN          Strength Right    # Reps 1  -EN      Right Rung 2  -EN      Right  Test 1 50  -EN       Strength Average Right 50  -EN          Strength Left    # Reps 1  -EN      Left Rung 2  -EN      Left  Test 1 29  with pain  -EN       Strength Average Left 29  -EN         Left Hand Strength - Pinch (lbs)    Lateral 10 lbs  with pain  -EN         Therapy Education    Education Details Educated on edema management. Kinesio taped ulnar wrist for support, issued size C tubigrip for daytime wear to address edema. Educated on ROM exercises.  -EN      Given HEP;Symptoms/condition management;Edema management  -EN      Program New  -EN      How Provided Verbal;Demonstration;Written  -EN      Provided to Patient  -EN      Level of Understanding Teach back education performed;Verbalized;Demonstrated  -EN                User Key  (r) = Recorded By, (t) = Taken By, (c) = Cosigned By      Initials Name Provider Type    Natalia Bettencourt OTR Occupational Therapist                        Therapy Education  Education Details: Educated on edema management. Kinesio taped ulnar wrist for support, issued size C tubigrip for daytime wear to address edema. Educated on ROM exercises.  Given: HEP, Symptoms/condition management, Edema management  Program: New  How Provided: Verbal, Demonstration, Written  Provided to: Patient  Level of Understanding: Teach back education performed, Verbalized, Demonstrated     OT Goals       Row Name 05/03/24 1300          OT Short Term Goals    STG Date to Achieve 05/17/24  -EN     STG 1 Patient will  demonstrate independence with HEP/edema management.  -EN     STG 1 Progress New  -EN     STG 2 Patient will report decreased L wrist/hand pain with use by 50%.  -EN     STG 2 Progress New  -EN     STG 3 Patient will demonstrate improved left  strength 5# for improved grasp of objects.  -EN     STG 3 Progress New  -EN        Long Term Goals    LTG Date to Achieve 05/31/24  -EN     LTG 1 Patient will demonstrate 5/5 wrist/forearm strength for improved ADL/IADL independence.  -EN     LTG 1 Progress New  -EN     LTG 2 Patient will demonstrate improved L  strength by 15# or more for improved grasp of objects.  -EN     LTG 2 Progress New  -EN     LTG 3 Improved Quick Dash 40 points or more.  -EN     LTG 3 Progress New  -EN     LTG 4 Patient will report decreased pain with use by 75%.  -EN     LTG 4 Progress New  -EN               User Key  (r) = Recorded By, (t) = Taken By, (c) = Cosigned By      Initials Name Provider Type    EN Natalia Barrios OTR Occupational Therapist                     OT Assessment/Plan       Row Name 05/03/24 1604 05/03/24 1559       OT Assessment    Functional Limitations -- Limitation in home management;Performance in leisure activities;Performance in self-care ADL;Performance in work activities  -EN    Impairments -- Edema;Muscle strength;Pain;Sensation  -EN    Assessment Comments Patient presents to OT with c/o left wrist and hand pain from a fall on 2/6/24. Patient rates pain at rest 3-4/10 and up to 8-10/10 with certain activities such as lifting. Patient demonstrated WNL wrist and hand ROM however had mild wrist/hand edema, decreased hand and wrist strength, and reports occasional numbness and tingling. Patient reports most painful areas:  left first dorsal compartment, thenar eminence and ulnar wrist. Patient will benefit from OT to address these deficits so she may return to pain free ADL/IADL tasks.  -EN --    Please refer to paper survey for additional self-reported  information Yes  -EN --    OT Diagnosis Left hand/wrist pain, decreased strength, decreased ADL/IADL independence  -EN --    OT Rehab Potential Good  -EN --    Patient/caregiver participated in establishment of treatment plan and goals Yes  -EN --    Patient would benefit from skilled therapy intervention Yes  -EN --       OT Plan    OT Frequency -- 2x/week  -EN    Predicted Duration of Therapy Intervention (OT) -- 4 weeks  -EN    Planned CPT's? -- OT EVAL LOW COMPLEXITY: 09756;OT THER ACT EA 15 MIN: [id number];OT HOT/COLD PACK;OT IONTOPHORESIS EA 15 MIN: 80288  -EN    Planned Therapy Interventions (Optional Details) -- patient/family education;ROM (Range of Motion);strengthening;home exercise program  edema management  -EN    OT Plan Comments -- Continue with plan of care.  -EN              User Key  (r) = Recorded By, (t) = Taken By, (c) = Cosigned By      Initials Name Provider Type    Natalia Bettencourt OTR Occupational Therapist                           Outcome Measure Options: Quick DASH  Quick DASH  Open a tight or new jar.: Severe Difficulty  Do heavy household chores (e.g., wash walls, wash floors): Severe Difficulty  Carry a shopping bag or briefcase: Severe Difficulty  Wash your back: Mild Difficulty  Use a knife to cut food: Moderate Difficulty  Recreational activities in which you take some force or impact through your arm, should or hand (e.g. golf, hammering, tennis, etc.): Severe Difficulty  During the past week, to what extent has your arm, shoulder, or hand problem interfered with your normal social activites with family, friends, neighbors or groups?: Moderately  During the past week, were you limited in your work or other regular daily activities as a result of your arm, shoulder or hand problem?: Moderately Limited  Arm, Shoulder, or hand pain: Moderate  Tingling (pins and needles) in your arm, shoulder, or hand: Severe  During the past week, how much difficulty have you had sleeping because  of the pain in your arm, shoulder or hand?: Severe Difficulty  Number of Questions Answered: 11  Quick DASH Score: 61.36  Work Module (Optional)  Using your usual technique for your work?: Moderate Difficulty  Doing your usual work because of arm, shoulder or hand pain?: Moderate Difficulty  Doing your work as well as you would like?: Moderate Difficulty  Spending your usual amount of time doing your work?: Moderate Difficulty  Work Module Score: 50  Sports/Performing Arts Module (Optional)  Using your usual technique for playing your instrument or sport?: Severe Difficulty  Playing your musical instrument or sport because of arm, shoulder or hand pain?: Severe Difficulty  Playing your musical instrument or sport as well as you would like?: Severe Difficulty  Spending your usual amount of time practising or playing your instrument or sport?: Severe Difficulty  Sports/Performing Arts Score: 75         Time Calculation:    OT Start Time: 1300  OT Stop Time: 1405  OT Time Calculation (min): 65 min  Untimed Charges  OT Moist Heat Minutes: 10 (MH applied while HEP and edema managment reviewed)  Total Minutes  Untimed Charges Total Minutes: 10   Total Minutes: 10     Therapy Charges for Today       Code Description Service Date Service Provider Modifiers Qty    83025002439 HC OT HOT OR COLD PACK TREAT [facility] 5/3/2024 Natalia Barrios OTR GO 1    [id number] HC OT EVAL LOW COMPLEXITY 4 5/3/2024 Natalia Barrios OTR GO 1                    ERA Stoner  5/3/2024

## 2024-05-07 ENCOUNTER — HOSPITAL ENCOUNTER (OUTPATIENT)
Dept: OCCUPATIONAL THERAPY | Facility: HOSPITAL | Age: 54
Setting detail: THERAPIES SERIES
Discharge: HOME | End: 2024-05-07
Payer: OTHER MISCELLANEOUS

## 2024-05-07 DIAGNOSIS — M79.642 LEFT HAND PAIN: Primary | ICD-10-CM

## 2024-05-07 DIAGNOSIS — M25.532 LEFT WRIST PAIN: ICD-10-CM

## 2024-05-07 PROCEDURE — 97530 THERAPEUTIC ACTIVITIES: CPT

## 2024-05-07 PROCEDURE — 97033 APP MDLTY 1+IONTPHRSIS EA 15: CPT

## 2024-05-09 ENCOUNTER — HOSPITAL ENCOUNTER (OUTPATIENT)
Dept: PHYSICAL THERAPY | Facility: HOSPITAL | Age: 54
Setting detail: THERAPIES SERIES
Discharge: HOME | End: 2024-05-09
Payer: OTHER MISCELLANEOUS

## 2024-05-09 DIAGNOSIS — M54.2 NECK PAIN: Primary | ICD-10-CM

## 2024-05-09 DIAGNOSIS — M25.562 LEFT KNEE PAIN, UNSPECIFIED CHRONICITY: ICD-10-CM

## 2024-05-09 PROCEDURE — 97110 THERAPEUTIC EXERCISES: CPT

## 2024-05-09 PROCEDURE — 97012 MECHANICAL TRACTION THERAPY: CPT

## 2024-05-10 ENCOUNTER — HOSPITAL ENCOUNTER (OUTPATIENT)
Dept: OCCUPATIONAL THERAPY | Facility: HOSPITAL | Age: 54
Setting detail: THERAPIES SERIES
Discharge: HOME | End: 2024-05-10
Payer: OTHER MISCELLANEOUS

## 2024-05-10 DIAGNOSIS — M79.642 LEFT HAND PAIN: Primary | ICD-10-CM

## 2024-05-10 DIAGNOSIS — M25.532 LEFT WRIST PAIN: ICD-10-CM

## 2024-05-10 PROCEDURE — 97035 APP MDLTY 1+ULTRASOUND EA 15: CPT

## 2024-05-10 PROCEDURE — 97530 THERAPEUTIC ACTIVITIES: CPT

## 2024-05-14 ENCOUNTER — DOCUMENTATION (OUTPATIENT)
Dept: PHYSICAL THERAPY | Facility: HOSPITAL | Age: 54
End: 2024-05-14
Payer: COMMERCIAL

## 2024-05-14 ENCOUNTER — APPOINTMENT (OUTPATIENT)
Dept: PHYSICAL THERAPY | Facility: HOSPITAL | Age: 54
End: 2024-05-14
Payer: OTHER MISCELLANEOUS

## 2024-05-14 ENCOUNTER — APPOINTMENT (OUTPATIENT)
Dept: OCCUPATIONAL THERAPY | Facility: HOSPITAL | Age: 54
End: 2024-05-14
Payer: OTHER MISCELLANEOUS

## 2024-05-21 ENCOUNTER — HOSPITAL ENCOUNTER (OUTPATIENT)
Dept: OCCUPATIONAL THERAPY | Facility: HOSPITAL | Age: 54
Setting detail: THERAPIES SERIES
Discharge: HOME | End: 2024-05-21
Payer: OTHER MISCELLANEOUS

## 2024-05-21 PROCEDURE — 97033 APP MDLTY 1+IONTPHRSIS EA 15: CPT

## 2024-05-21 PROCEDURE — 97530 THERAPEUTIC ACTIVITIES: CPT

## 2024-05-21 NOTE — THERAPY TREATMENT NOTE
Outpatient Occupational Therapy Ortho Treatment Note  ALEXANDER Montenegro     Patient Name: Jocelyn Ríos  : 1970  MRN: [id number]  Today's Date: 2024        Visit Date: 2024    Patient Active Problem List   Diagnosis    Anxiety    Moderate persistent asthma without complication    Environmental allergies    Routine adult health maintenance    Complex regional pain syndrome i of left lower limb    Carpal tunnel syndrome on both sides    Status post arthroscopic surgery of left knee, DOS 10/21/2019    Intractable neuropathic pain of left knee    Chronic cervical pain    Chondromalacia of knee, left    Screening mammogram for breast cancer    Moderate mixed hyperlipidemia not requiring statin therapy        Past Medical History:   Diagnosis Date    Acute sinus infection     on poab    Anxiety     Arthritis     Asthma     STARTED @ 41 YRS OLD, DR. BUENO, & [facility] ALLERGY DR. LOPEZ    COVID-19     2021    CRPS (complex regional pain syndrome) type I of lower limb     Elevated cholesterol     Frequent UTI     GERD (gastroesophageal reflux disease)     History of 2019 novel coronavirus disease (COVID-19)     Iliotibial band syndrome affecting left lower leg     Itching     BLE has some scratches to notify MD if doesn't clear up prior to surgery    Kidney stones     Lateral collateral ligament deficiency of left knee 2016    Lateral meniscal tear     Lumbar paraspinal muscle spasm 2017    Meniscal cyst, left 2019    Migraines     Patellofemoral pain syndrome of left knee 2019    PONV (postoperative nausea and vomiting)     Seasonal allergies     Subchondral insufficiency fracture of femoral condyle, left, initial encounter 01/10/2020    Tear of lateral meniscus of left knee, current 2019        Past Surgical History:   Procedure Laterality Date     SECTION      x2    DIAGNOSTIC LAPAROSCOPY      FOOT SURGERY Right     HERNIA REPAIR Bilateral      inguinal hernia age 6    INTRAUTERINE DEVICE INSERTION      KNEE ARTHROSCOPY Left 10/21/2016    Procedure: KNEE ARTHROSCOPY debridement of lateral meniscal tear;  Surgeon: Son Mojica MD;  Location:  [facility];  Service:     KNEE ARTHROSCOPY Left 7/27/2018    Procedure: KNEE ARTHROSCOPY;  Surgeon: Son Mojica MD;  Location:  [facility];  Service: Orthopedics    KNEE ARTHROSCOPY Left 12/2/2021    Procedure: LEFT KNEE DIAGNOSTIC  ARTHROSCOPY AND PARTLATERAL MENISECTOMY AND REMOVAL OF MULTIPLE FIXATION DEVICES;  Surgeon: Rodríguez Hamlin MD;  Location:  [facility];  Service: Orthopedics;  Laterality: Left;    KNEE MENISCAL REPAIR Left 10/21/2019    Procedure: knee arthroscopy, debridement of meniscal cyst, and lateral meniscal repair;  Surgeon: Bridger Figueroa MD;  Location:  [facility];  Service: Orthopedics    NERVE BLOCK Left 5/25/2021    Procedure: KNEE GENICULAR NERVE BLOCK UNDER FLUORO- x2, 3-4 wks apart;  Surgeon: James Spivey MD;  Location: [facility] MAIN OR;  Service: Pain Management;  Laterality: Left;         Visit Dx:  No diagnosis found.     OT Ortho       Row Name 05/21/24 1600             Subjective Comments    Subjective Comments Patient reports she's doing better.  -EN         MMT Left Upper Ext    Lt Elbow Flexion MMT, Gross Movement (5/5) normal  -EN      Lt Elbow Extension MMT, Gross Movement (5/5) normal  -EN      Lt Forearm Supination MMT, Gross Movement (5/5) normal  -EN      Lt Forearm Pronation MMT, Gross Movement (5/5) normal  -EN      Lt Wrist Flexion MMT, Gross Movement (5/5) normal  -EN      Lt Wrist Extension MMT, Gross Movement (5/5) normal  -EN      Lt Upper Extremity Comments  UD 4+/5, RD 4+/5  -EN         LUE Edema - Circumference (cm)    Wrist Crease 14.3 cm  -EN                User Key  (r) = Recorded By, (t) = Taken By, (c) = Cosigned By      Initials Name Provider Type    Natalia Bettencourt OTR Occupational Therapist                    Hand Therapy (Last 24 Hours)        Hand Eval       Row Name 05/21/24 1600 05/21/24 1500          Subjective Pain    Able to rate subjective pain? -- yes  -EN     Pre-Treatment Pain Level -- 3  -EN     Subjective Pain Comment -- Patient reports less pain. Reports with use up to 4-5/10.  -EN         Strength Left    # Reps 1  -EN --     Left Rung 2  -EN --     Left  Test 1 38  -EN --      Strength Average Left 38  -EN --        Left Hand Strength - Pinch (lbs)    Lateral 12 lbs  -EN --        Therapy Education    Education Details Increased to 1.5 pounds for wrist strengthening, continue with theraputty  -EN --     Given HEP;Symptoms/condition management  -EN --     Program Reinforced;Progressed  -EN --     How Provided Verbal;Demonstration  -EN --     Provided to Patient  -EN --     Level of Understanding Teach back education performed;Verbalized;Demonstrated  -EN --               User Key  (r) = Recorded By, (t) = Taken By, (c) = Cosigned By      Initials Name Provider Type    EN Natalia Barrios OTR Occupational Therapist                        Therapy Education  Education Details: Increased to 1.5 pounds for wrist strengthening, continue with theraputty  Given: HEP, Symptoms/condition management  Program: Reinforced, Progressed  How Provided: Verbal, Demonstration  Provided to: Patient  Level of Understanding: Teach back education performed, Verbalized, Demonstrated     OT Assessment/Plan       Row Name 05/21/24 1624          OT Assessment    Functional Limitations Limitation in home management;Performance in leisure activities;Performance in self-care ADL;Performance in work activities  -EN     Impairments Edema;Muscle strength;Pain;Sensation  -EN     Assessment Comments Patient reports less pain. Pinch strength improved 2 pounds. Patient progressing well with strengthening program. Pain at rest is 3/10, up to 4-5/10 with certain activities. Patient reports MD appointment went well and doctor recommended patient continue with  therapy.  -EN     OT Diagnosis Left hand/wrist pain, decreased strength, decreased ADL/IADL independence  -EN     OT Rehab Potential Good  -EN        OT Plan    OT Plan Comments Continue with goals.  -EN               User Key  (r) = Recorded By, (t) = Taken By, (c) = Cosigned By      Initials Name Provider Type    Natalia Bettencourt OTMACIEJ Occupational Therapist                      OT Goals       Row Name 05/21/24 1500          OT Short Term Goals    STG Date to Achieve 05/17/24  -EN     STG 1 Patient will demonstrate independence with HEP/edema management.  -EN     STG 1 Progress Met  -EN     STG 2 Patient will report decreased L wrist/hand pain with use by 50%.  -EN     STG 2 Progress Progressing;Ongoing  -EN     STG 2 Progress Comments u  -EN     STG 3 Patient will demonstrate improved left  strength 5# for improved grasp of objects.  -EN     STG 3 Progress Met  -EN        Long Term Goals    LTG Date to Achieve 05/31/24  -EN     LTG 1 Patient will demonstrate 5/5 wrist/forearm strength for improved ADL/IADL independence.  -EN     LTG 1 Progress Progressing;Ongoing  -EN     LTG 2 Patient will demonstrate improved L  strength by 15# or more for improved grasp of objects.  -EN     LTG 2 Progress Progressing;Ongoing  -EN     LTG 3 Improved Quick Dash 40 points or more.  -EN     LTG 3 Progress Ongoing  -EN     LTG 4 Patient will report decreased pain with use by 75%.  -EN     LTG 4 Progress Progressing;Ongoing  -EN               User Key  (r) = Recorded By, (t) = Taken By, (c) = Cosigned By      Initials Name Provider Type    Natalia Bettencourt OTR Occupational Therapist                     Modalities       Row Name 05/21/24 1500             Moist Heat    MH Applied Yes  -EN      Location left wrist/hand  -EN      OT Moist Heat Minutes 12  -EN      MH S/P Rx Yes  -EN         Iontophoresis 85222    Milliamps 4  radial wrist (first dorsal compartment)  -EN      MA/Min 40  -EN      Dexamethasone used  Yes  -EN      Patch Type Medium  -EN      51767 - OT Iontophoresis Minutes 10  -EN                User Key  (r) = Recorded By, (t) = Taken By, (c) = Cosigned By      Initials Name Provider Type    Natalia Bettencourt OTR Occupational Therapist                   OT Exercises       Row Name 05/21/24 1600             Exercise 1    Exercise Name 1 retrograde edema massage left hand/wrist  -EN         Exercise 2    Exercise Name 2 gentle ROM exercises  -EN         Exercise 3    Exercise Name 3 light wrist strengthening with one pound dumbell- inner range flexion, extension, RD, UD  -EN      Cueing 3 Verbal;Tactile;Demo  -EN      Equipment 3 Dumbell  -EN      Weights/Plates 3 1  1.5  -EN      Sets 3 1  -EN      Reps 3 15  -EN         Exercise 4    Exercise Name 4 light hand strengthening  -EN      Cueing 4 Verbal;Tactile;Demo  -EN      Equipment 4 Hand Gripper  -EN      Weights/Plates 4 15  -EN      Sets 4 1  -EN      Reps 4 15  hold 3 seconds  -EN                User Key  (r) = Recorded By, (t) = Taken By, (c) = Cosigned By      Initials Name Provider Type    Natalia Bettencourt OTR Occupational Therapist                                  Time Calculation:   OT Start Time: 1531  OT Stop Time: 1630  OT Time Calculation (min): 59 min  Timed Charges  03499 - OT Iontophoresis Minutes: 10  47085 - OT Therapeutic Activity Minutes: 30  Untimed Charges  OT Moist Heat Minutes: 12  Total Minutes  Timed Charges Total Minutes: 30  Untimed Charges Total Minutes: 12   Total Minutes: 30     Therapy Charges for Today       Code Description Service Date Service Provider Modifiers Qty    72866117098 HC OT IONTOPHORESIS EA 15 MIN 5/21/2024 Natalia Barrios OTR GO 1    79276055700 HC OT THERAPEUTIC ACT EA 15 MIN 5/21/2024 Natalia Barrios OTR GO 2    82787099465 HC OT HOT OR COLD PACK TREAT MCARE 5/21/2024 Natalia Barrios OTR GO 1                      ERA Stoner  5/21/2024

## 2024-05-28 ENCOUNTER — HOSPITAL ENCOUNTER (OUTPATIENT)
Dept: OCCUPATIONAL THERAPY | Facility: HOSPITAL | Age: 54
Setting detail: THERAPIES SERIES
Discharge: HOME | End: 2024-05-28
Payer: OTHER MISCELLANEOUS

## 2024-05-28 DIAGNOSIS — M25.532 LEFT WRIST PAIN: ICD-10-CM

## 2024-05-28 DIAGNOSIS — M79.642 LEFT HAND PAIN: Primary | ICD-10-CM

## 2024-05-28 PROCEDURE — 97033 APP MDLTY 1+IONTPHRSIS EA 15: CPT

## 2024-05-28 PROCEDURE — 97530 THERAPEUTIC ACTIVITIES: CPT

## 2024-05-28 NOTE — THERAPY TREATMENT NOTE
Outpatient Occupational Therapy Ortho Treatment Note  [facility]EXAND[facility]en[facility]ro     Patient Name: Jocelyn Ríos  : 1970  MRN: [id number]  Today's Date: 2024        Visit Date: 2024    Patient Active Problem List   Diagnosis    Anxiety    Moderate persistent asthma without complication    Environmental allergies    Routine adult health maintenance    Complex regional pain syndrome i of left lower limb    Carpal tunnel syndrome on both sides    Status post arthroscopic surgery of left knee, DOS 10/21/2019    Intractable neuropathic pain of left knee    Chronic cervical pain    Chondromalacia of knee, left    Screening mammogram for breast cancer    Moderate mixed hyperlipidemia not requiring statin therapy        Past Medical History:   Diagnosis Date    Acute sinus infection     on poab    Anxiety     Arthritis     Asthma     STARTED @ 41 YRS OLD, DR. BUENO, & [facility] ALLERGY DR. LOPEZ    COVID-19     2021    CRPS (complex regional pain syndrome) type I of lower limb     Elevated cholesterol     Frequent UTI     GERD (gastroesophageal reflux disease)     History of 2019 novel coronavirus disease (COVID-19)     Iliotibial band syndrome affecting left lower leg     Itching     BLE has some scratches to notify MD if doesn't clear up prior to surgery    Kidney stones     Lateral collateral ligament deficiency of left knee 2016    Lateral meniscal tear     Lumbar paraspinal muscle spasm 2017    Meniscal cyst, left 2019    Migraines     Patellofemoral pain syndrome of left knee 2019    PONV (postoperative nausea and vomiting)     Seasonal allergies     Subchondral insufficiency fracture of femoral condyle, left, initial encounter 01/10/2020    Tear of lateral meniscus of left knee, current 2019        Past Surgical History:   Procedure Laterality Date     SECTION      x2    DIAGNOSTIC LAPAROSCOPY      FOOT SURGERY Right     HERNIA REPAIR Bilateral      inguinal hernia age 6    INTRAUTERINE DEVICE INSERTION      KNEE ARTHROSCOPY Left 10/21/2016    Procedure: KNEE ARTHROSCOPY debridement of lateral meniscal tear;  Surgeon: Son Mojica MD;  Location:  [facility];  Service:     KNEE ARTHROSCOPY Left 7/27/2018    Procedure: KNEE ARTHROSCOPY;  Surgeon: Son Mojica MD;  Location:  [facility];  Service: Orthopedics    KNEE ARTHROSCOPY Left 12/2/2021    Procedure: LEFT KNEE DIAGNOSTIC  ARTHROSCOPY AND PARTLATERAL MENISECTOMY AND REMOVAL OF MULTIPLE FIXATION DEVICES;  Surgeon: Rodríguez Hamlin MD;  Location:  [facility];  Service: Orthopedics;  Laterality: Left;    KNEE MENISCAL REPAIR Left 10/21/2019    Procedure: knee arthroscopy, debridement of meniscal cyst, and lateral meniscal repair;  Surgeon: Bridger Figueroa MD;  Location:  [facility];  Service: Orthopedics    NERVE BLOCK Left 5/25/2021    Procedure: KNEE GENICULAR NERVE BLOCK UNDER FLUORO- x2, 3-4 wks apart;  Surgeon: James Spivey MD;  Location: [facility] MAIN OR;  Service: Pain Management;  Laterality: Left;         Visit Dx:    ICD-10-CM ICD-9-CM   1. Left hand pain  M79.642 729.5   2. Left wrist pain  M25.532 719.43           Hand Therapy (Last 24 Hours)       Hand Eval       Row Name 05/28/24 1600 05/28/24 1500          Subjective Pain    Able to rate subjective pain? -- yes  -EN         Strength Left    # Reps 1  -EN --     Left Rung 2  -EN --     Left  Test 1 38  -EN --      Strength Average Left 38  -EN --        Therapy Education    Education Details increased to 2 pounds  -EN --     Given HEP;Edema management;Symptoms/condition management  -EN --     Program Reinforced;Progressed  -EN --     How Provided Verbal;Demonstration  -EN --     Provided to Patient  -EN --     Level of Understanding Teach back education performed;Verbalized;Demonstrated  -EN --               User Key  (r) = Recorded By, (t) = Taken By, (c) = Cosigned By      Initials Name Provider Type    EN Natalia Barrios  OTR Occupational Therapist                        Therapy Education  Education Details: increased to 2 pounds  Given: HEP, Edema management, Symptoms/condition management  Program: Reinforced, Progressed  How Provided: Verbal, Demonstration  Provided to: Patient  Level of Understanding: Teach back education performed, Verbalized, Demonstrated     OT Assessment/Plan       Row Name 05/28/24 1624          OT Assessment    Functional Limitations Limitation in home management;Performance in leisure activities;Performance in self-care ADL;Performance in work activities  -EN     Impairments Edema;Muscle strength;Pain;Sensation  -EN     Assessment Comments Patient reports pain staying around a 3/10 in the left wrist, increases occasionally with certain movements up to a 5/10. Patient progressing with strengthening program with no report of increased pain.  -EN     OT Diagnosis Left hand/wrist pain, decreased strength, decreased ADL/IADL independence  -EN     OT Rehab Potential Good  -EN        OT Plan    OT Plan Comments Continue with goals.  -EN               User Key  (r) = Recorded By, (t) = Taken By, (c) = Cosigned By      Initials Name Provider Type    Natalia Bettencourt OTR Occupational Therapist                      OT Goals       Row Name 05/28/24 1600          OT Short Term Goals    STG Date to Achieve 06/07/24  -EN     STG 1 Patient will demonstrate independence with HEP/edema management.  -EN     STG 1 Progress Met  -EN     STG 2 Patient will report decreased L wrist/hand pain with use by 50%.  -EN     STG 2 Progress Ongoing  -EN     STG 3 Patient will demonstrate improved left  strength 5# for improved grasp of objects.  -EN     STG 3 Progress Met  -EN        Long Term Goals    LTG Date to Achieve 06/21/24  -EN     LTG 1 Patient will demonstrate 5/5 wrist/forearm strength for improved ADL/IADL independence.  -EN     LTG 1 Progress Ongoing  -EN     LTG 2 Patient will demonstrate improved L  strength  by 15# or more for improved grasp of objects.  -EN     LTG 2 Progress Ongoing  -EN     LTG 3 Improved Quick Dash 40 points or more.  -EN     LTG 3 Progress Ongoing  -EN     LTG 4 Patient will report decreased pain with use by 75%.  -EN     LTG 4 Progress Ongoing  -EN               User Key  (r) = Recorded By, (t) = Taken By, (c) = Cosigned By      Initials Name Provider Type    Natalia Bettencourt OTR Occupational Therapist                     Modalities       Row Name 05/28/24 1600 05/28/24 1530          Moist Heat    MH Applied -- Yes  -EN     Location -- left wrist/hand  -EN     OT Moist Heat Minutes -- 12  -EN     MH S/P Rx -- Yes  -EN        Iontophoresis 61422    Milliamps 4  radial wrist (first dorsal compartment)  -EN --     MA/Min 40  -EN --     Dexamethasone used Yes  -EN --     Patch Type Medium  -EN --     68632 - OT Iontophoresis Minutes 10  -EN --               User Key  (r) = Recorded By, (t) = Taken By, (c) = Cosigned By      Initials Name Provider Type    Natalia Bettencourt OTMACIEJ Occupational Therapist                   OT Exercises       Row Name 05/28/24 1500             Subjective Pain    Able to rate subjective pain? yes  -EN      Pre-Treatment Pain Level 3  reports 5/10 with use occasionally  -EN      Subjective Pain Comment Patient reports she's been lifting and packing a lot at work with end of year struff.  -EN         Exercise 1    Exercise Name 1 retrograde edema massage left hand/wrist  -EN         Exercise 2    Exercise Name 2 gentle ROM exercises  -EN         Exercise 3    Exercise Name 3 light wrist strengthening with one pound dumbell- inner range flexion, extension, RD, UD  -EN      Cueing 3 Verbal;Tactile;Demo  -EN      Equipment 3 Dumbell  -EN      Weights/Plates 3 2  -EN      Sets 3 1  -EN      Reps 3 15  -EN         Exercise 4    Exercise Name 4 light hand strengthening  -EN      Cueing 4 Verbal;Tactile;Demo  -EN      Equipment 4 Hand Gripper  -EN      Weights/Plates 4  25  -EN      Sets 4 1  -EN      Reps 4 20  hold 3 seconds  -EN                User Key  (r) = Recorded By, (t) = Taken By, (c) = Cosigned By      Initials Name Provider Type    Natalia Bettencourt OTR Occupational Therapist                                  Time Calculation:   OT Start Time: 1530  OT Stop Time: 1622  OT Time Calculation (min): 52 min  Timed Charges  07682 - OT Iontophoresis Minutes: 10  25796 - OT Therapeutic Activity Minutes: 25  Untimed Charges  OT Moist Heat Minutes: 12  Total Minutes  Timed Charges Total Minutes: 25  Untimed Charges Total Minutes: 12   Total Minutes: 25     Therapy Charges for Today       Code Description Service Date Service Provider Modifiers Qty    85925603303 HC OT THERAPEUTIC ACT EA 15 MIN 5/28/2024 Natalia Barrios OTR GO 2    70851400509 HC OT HOT OR COLD PACK TREAT MCARE 5/28/2024 Natalia Barrios OTR GO 1    92155220644 HC OT IONTOPHORESIS EA 15 MIN 5/28/2024 Natalia Barrios OTR GO 1                      ERA Stoner  5/28/2024

## 2024-05-31 ENCOUNTER — HOSPITAL ENCOUNTER (OUTPATIENT)
Dept: OCCUPATIONAL THERAPY | Facility: HOSPITAL | Age: 54
Setting detail: THERAPIES SERIES
Discharge: HOME | End: 2024-05-31
Payer: OTHER MISCELLANEOUS

## 2024-05-31 ENCOUNTER — HOSPITAL ENCOUNTER (OUTPATIENT)
Dept: PHYSICAL THERAPY | Facility: HOSPITAL | Age: 54
Setting detail: THERAPIES SERIES
Discharge: HOME | End: 2024-05-31
Payer: OTHER MISCELLANEOUS

## 2024-05-31 DIAGNOSIS — M25.562 LEFT KNEE PAIN, UNSPECIFIED CHRONICITY: ICD-10-CM

## 2024-05-31 DIAGNOSIS — M54.2 NECK PAIN: Primary | ICD-10-CM

## 2024-05-31 DIAGNOSIS — M25.532 LEFT WRIST PAIN: ICD-10-CM

## 2024-05-31 DIAGNOSIS — M79.642 LEFT HAND PAIN: Primary | ICD-10-CM

## 2024-05-31 PROCEDURE — 97110 THERAPEUTIC EXERCISES: CPT

## 2024-05-31 PROCEDURE — 97530 THERAPEUTIC ACTIVITIES: CPT

## 2024-05-31 PROCEDURE — 97033 APP MDLTY 1+IONTPHRSIS EA 15: CPT

## 2024-05-31 PROCEDURE — 97012 MECHANICAL TRACTION THERAPY: CPT

## 2024-05-31 NOTE — THERAPY PROGRESS REPORT/RE-CERT
Outpatient Occupational Therapy Ortho Progress Note  [facility]     Patient Name: Jocelyn Ríos  : 1970  MRN: [id number]  Today's Date: 2024        Visit Date: 2024    Patient Active Problem List   Diagnosis    Anxiety    Moderate persistent asthma without complication    Environmental allergies    Routine adult health maintenance    Complex regional pain syndrome i of left lower limb    Carpal tunnel syndrome on both sides    Status post arthroscopic surgery of left knee, DOS 10/21/2019    Intractable neuropathic pain of left knee    Chronic cervical pain    Chondromalacia of knee, left    Screening mammogram for breast cancer    Moderate mixed hyperlipidemia not requiring statin therapy        Past Medical History:   Diagnosis Date    Acute sinus infection     on poab    Anxiety     Arthritis     Asthma     STARTED @ 41 YRS OLD, DR. BUENO, & [facility] ALLERGY DR. LOPEZ    COVID-19     2021    CRPS (complex regional pain syndrome) type I of lower limb     Elevated cholesterol     Frequent UTI     GERD (gastroesophageal reflux disease)     History of 2019 novel coronavirus disease (COVID-19)     Iliotibial band syndrome affecting left lower leg     Itching     BLE has some scratches to notify MD if doesn't clear up prior to surgery    Kidney stones     Lateral collateral ligament deficiency of left knee 2016    Lateral meniscal tear     Lumbar paraspinal muscle spasm 2017    Meniscal cyst, left 2019    Migraines     Patellofemoral pain syndrome of left knee 2019    PONV (postoperative nausea and vomiting)     Seasonal allergies     Subchondral insufficiency fracture of femoral condyle, left, initial encounter 01/10/2020    Tear of lateral meniscus of left knee, current 2019        Past Surgical History:   Procedure Laterality Date     SECTION      x2    DIAGNOSTIC LAPAROSCOPY      FOOT SURGERY Right     HERNIA REPAIR Bilateral      inguinal hernia age 6    INTRAUTERINE DEVICE INSERTION      KNEE ARTHROSCOPY Left 10/21/2016    Procedure: KNEE ARTHROSCOPY debridement of lateral meniscal tear;  Surgeon: Son Mojica MD;  Location:  [facility];  Service:     KNEE ARTHROSCOPY Left 7/27/2018    Procedure: KNEE ARTHROSCOPY;  Surgeon: Son Mojica MD;  Location:  [facility];  Service: Orthopedics    KNEE ARTHROSCOPY Left 12/2/2021    Procedure: LEFT KNEE DIAGNOSTIC  ARTHROSCOPY AND PARTLATERAL MENISECTOMY AND REMOVAL OF MULTIPLE FIXATION DEVICES;  Surgeon: Rodríguez Hamlin MD;  Location:  [facility];  Service: Orthopedics;  Laterality: Left;    KNEE MENISCAL REPAIR Left 10/21/2019    Procedure: knee arthroscopy, debridement of meniscal cyst, and lateral meniscal repair;  Surgeon: Bridger Figueroa MD;  Location:  [facility];  Service: Orthopedics    NERVE BLOCK Left 5/25/2021    Procedure: KNEE GENICULAR NERVE BLOCK UNDER FLUORO- x2, 3-4 wks apart;  Surgeon: James Spivey MD;  [place] [facility] [place]  Service: Pain Management;  Laterality: Left;         Visit Dx:    ICD-10-CM ICD-9-CM   1. Left hand pain  M79.642 729.5   2. Left wrist pain  M25.532 719.43        OT Ortho       Row Name 05/31/24 1100             Subjective Pain    Able to rate subjective pain? yes  -EN      Pre-Treatment Pain Level 3  -EN      Subjective Pain Comment Reports not bad at rest however with certain movement (sweeping with broom, twisting arm, etc...) pain increases up to 7/10  -EN         Left Upper Ext    Lt Upper Extremity Comments  L UE AROM WNL  -EN         MMT Left Upper Ext    Lt Elbow Flexion MMT, Gross Movement (5/5) normal  -EN      Lt Elbow Extension MMT, Gross Movement (5/5) normal  -EN      Lt Forearm Supination MMT, Gross Movement (5/5) normal  -EN      Lt Forearm Pronation MMT, Gross Movement (5/5) normal  -EN      Lt Wrist Flexion MMT, Gross Movement (5/5) normal  -EN      Lt Wrist Extension MMT, Gross Movement (5/5) normal  -EN      Lt Upper Extremity  Comments  UD 5/5, RD 5/5 (with pain)  -EN         LUE Edema - Circumference (cm)    Wrist Crease 14.3 cm  -EN                User Key  (r) = Recorded By, (t) = Taken By, (c) = Cosigned By      Initials Name Provider Type    Natalia Bettencourt OTR Occupational Therapist                    Hand Therapy (Last 24 Hours)       Hand Eval       Row Name 05/31/24 1100              Strength Left    # Reps 1  -EN      Left Rung 2  -EN      Left  Test 1 40  -EN       Strength Average Left 40  -EN         Therapy Education    Education Details comfort cool splint for patient to wear for housework, when gardening, etc... Progressed strengthening program to 3#  -EN      Given HEP;Symptoms/condition management;Edema management  -EN      Program Reinforced;Progressed  -EN      How Provided Verbal;Demonstration  -EN      Provided to Patient  -EN      Level of Understanding Teach back education performed;Verbalized;Demonstrated  -EN                User Key  (r) = Recorded By, (t) = Taken By, (c) = Cosigned By      Initials Name Provider Type    Natalia Bettencourt OTR Occupational Therapist                        Therapy Education  Education Details: comfort cool splint for patient to wear for housework, when gardening, etc... Progressed strengthening program to 3#  Given: HEP, Symptoms/condition management, Edema management  Program: Reinforced, Progressed  How Provided: Verbal, Demonstration  Provided to: Patient  Level of Understanding: Teach back education performed, Verbalized, Demonstrated     OT Assessment/Plan       Row Name 05/31/24 1321 05/31/24 1318       OT Assessment    Functional Limitations -- Limitation in home management;Performance in leisure activities;Performance in self-care ADL;Performance in work activities  -EN    Impairments -- Edema;Muscle strength;Pain;Sensation  -EN    Assessment Comments -- Patient has made significant progress the past month and reports pain in wrist 3/10 on  average. Patient did state with certain activities such as sweeping with a broom and gardening pain can increase up to 7/10. Patient issued cool comfort support  brace today to wear during these IADL activities. Patient's  strength has improved from 29 pounds to 40 pounds and wrist strength has improved to 5/5. Patient is doing well with exercises and will benefit from continued OT.  -EN    OT Diagnosis Left hand/wrist pain, decreased strength, decreased ADL/IADL independence  -EN --    OT Rehab Potential Good  -EN --       OT Plan    OT Plan Comments Continue with goals.  -EN --              User Key  (r) = Recorded By, (t) = Taken By, (c) = Cosigned By      Initials Name Provider Type    Natalia Bettencourt, OTR Occupational Therapist                      OT Goals       Row Name 05/31/24 1100          OT Short Term Goals    STG Date to Achieve 06/07/24  -EN     STG 1 Patient will demonstrate independence with HEP/edema management.  -EN     STG 1 Progress Met  -EN     STG 2 Patient will report decreased L wrist/hand pain with use by 50%.  -EN     STG 2 Progress Ongoing;Progressing  -EN     STG 2 Progress Comments ranges from 3 to 7/10 (increased to 7/10 yesterday when digging holes for plants)  -EN     STG 3 Patient will demonstrate improved left  strength 5# for improved grasp of objects.  -EN     STG 3 Progress Met  -EN        Long Term Goals    LTG Date to Achieve 06/21/24  -EN     LTG 1 Patient will demonstrate 5/5 wrist/forearm strength for improved ADL/IADL independence.  -EN     LTG 1 Progress Met  -EN     LTG 2 Patient will demonstrate improved L  strength by 15# or more for improved grasp of objects.  -EN     LTG 2 Progress Progressing;Ongoing  -EN     LTG 3 Improved Quick Dash 40 points or more.  -EN     LTG 3 Progress Progressing;Ongoing  -EN     LTG 4 Patient will report decreased pain with use by 75%.  -EN     LTG 4 Progress Progressing;Ongoing  -EN               User Key  (r) =  Recorded By, (t) = Taken By, (c) = Cosigned By      Initials Name Provider Type    Natalia Bettencourt OTMACIEJ Occupational Therapist                     Modalities       Row Name 05/31/24 1100             Moist Heat    Location left wrist/hand  -EN      OT Moist Heat Minutes 12  -EN      MH S/P Rx Yes  -EN         Iontophoresis 26638    Milliamps 4  radial wrist (first dorsal compartment)  -EN      MA/Min 40  -EN      Dexamethasone used Yes  -EN      Patch Type Medium  -EN      55394 - OT Iontophoresis Minutes 10  -EN                User Key  (r) = Recorded By, (t) = Taken By, (c) = Cosigned By      Initials Name Provider Type    Natalia Bettencourt OTR Occupational Therapist                   OT Exercises       Row Name 05/31/24 1100             Exercise 1    Exercise Name 1 retrograde edema massage left hand/wrist  -EN         Exercise 2    Exercise Name 2 gentle ROM exercises  -EN         Exercise 3    Exercise Name 3 light wrist strengthening with one pound dumbell- inner range flexion, extension, RD, UD  -EN      Cueing 3 Verbal;Tactile;Demo  -EN      Equipment 3 Dumbell  -EN      Weights/Plates 3 3  -EN      Sets 3 1  -EN      Reps 3 15  -EN         Exercise 4    Exercise Name 4 light hand strengthening  -EN      Cueing 4 Verbal;Tactile;Demo  -EN      Equipment 4 Hand Gripper  -EN      Weights/Plates 4 25  -EN      Sets 4 2  -EN      Reps 4 20  hold 3 seconds  -EN                User Key  (r) = Recorded By, (t) = Taken By, (c) = Cosigned By      Initials Name Provider Type    Natalia Bettencourt OTR Occupational Therapist                      Quick DASH  Open a tight or new jar.: Mild Difficulty  Do heavy household chores (e.g., wash walls, wash floors): Severe Difficulty  Carry a shopping bag or briefcase: Moderate Difficulty  Wash your back: Moderate Difficulty  Use a knife to cut food: Mild Difficulty  Recreational activities in which you take some force or impact through your arm, should or  hand (e.g. golf, hammering, tennis, etc.): Severe Difficulty  During the past week, to what extent has your arm, shoulder, or hand problem interfered with your normal social activites with family, friends, neighbors or groups?: Not at all  During the past week, were you limited in your work or other regular daily activities as a result of your arm, shoulder or hand problem?: Moderately Limited  Arm, Shoulder, or hand pain: Moderate  Tingling (pins and needles) in your arm, shoulder, or hand: Mild  During the past week, how much difficulty have you had sleeping because of the pain in your arm, shoulder or hand?: Severe Difficulty (combo of hand/knee pain)  Number of Questions Answered: 11  Quick DASH Score: 45.45           Time Calculation:   OT Start Time: 1106  OT Stop Time: 1200  OT Time Calculation (min): 54 min  Timed Charges  36276 - OT Iontophoresis Minutes: 10  00721 - OT Therapeutic Activity Minutes: 25  Untimed Charges  OT Moist Heat Minutes: 12  Total Minutes  Timed Charges Total Minutes: 25  Untimed Charges Total Minutes: 12   Total Minutes: 25     Therapy Charges for Today       Code Description Service Date Service Provider Modifiers Qty    63342849878 HC OT THERAPEUTIC ACT EA 15 MIN 5/31/2024 Natalia Barrios OTR GO 2    45173797535 HC OT IONTOPHORESIS EA 15 MIN 5/31/2024 Natalia Barrios OTR GO 1    72568385562 HC OT HOT OR COLD PACK TREAT MCARE 5/31/2024 Natalia Barrios OTR GO 1                      ERA Stoner  5/31/2024

## 2024-05-31 NOTE — THERAPY TREATMENT NOTE
Outpatient Physical Therapy Ortho Treatment Note  [facility]     Patient Name: Jocelyn Ríos  : 1970  MRN: [id number]  Today's Date: 2024      Visit Date: 2024    Visit Dx:    ICD-10-CM ICD-9-CM   1. Neck pain  M54.2 723.1   2. Left knee pain, unspecified chronicity  M25.562 719.46       Patient Active Problem List   Diagnosis    Anxiety    Moderate persistent asthma without complication    Environmental allergies    Routine adult health maintenance    Complex regional pain syndrome i of left lower limb    Carpal tunnel syndrome on both sides    Status post arthroscopic surgery of left knee, DOS 10/21/2019    Intractable neuropathic pain of left knee    Chronic cervical pain    Chondromalacia of knee, left    Screening mammogram for breast cancer    Moderate mixed hyperlipidemia not requiring statin therapy        Past Medical History:   Diagnosis Date    Acute sinus infection     on poab    Anxiety     Arthritis     Asthma     STARTED @ 41 YRS OLD, DR. BUENO, & [facility] ALLERGY DR. LOPEZ    COVID-19     2021    CRPS (complex regional pain syndrome) type I of lower limb     Elevated cholesterol     Frequent UTI     GERD (gastroesophageal reflux disease)     History of 2019 novel coronavirus disease (COVID-19)     Iliotibial band syndrome affecting left lower leg     Itching     BLE has some scratches to notify MD if doesn't clear up prior to surgery    Kidney stones     Lateral collateral ligament deficiency of left knee 2016    Lateral meniscal tear     Lumbar paraspinal muscle spasm 2017    Meniscal cyst, left 2019    Migraines     Patellofemoral pain syndrome of left knee 2019    PONV (postoperative nausea and vomiting)     Seasonal allergies     Subchondral insufficiency fracture of femoral condyle, left, initial encounter 01/10/2020    Tear of lateral meniscus of left knee, current 2019        Past Surgical History:   Procedure  "Laterality Date     SECTION      x2    DIAGNOSTIC LAPAROSCOPY      FOOT SURGERY Right     HERNIA REPAIR Bilateral     inguinal hernia age 6    INTRAUTERINE DEVICE INSERTION      KNEE ARTHROSCOPY Left 10/21/2016    Procedure: KNEE ARTHROSCOPY debridement of lateral meniscal tear;  Surgeon: Son Mojica MD;  Location:  [facility];  Service:     KNEE ARTHROSCOPY Left 2018    Procedure: KNEE ARTHROSCOPY;  Surgeon: Son Mojica MD;  Location:  [facility];  Service: Orthopedics    KNEE ARTHROSCOPY Left 2021    Procedure: LEFT KNEE DIAGNOSTIC  ARTHROSCOPY AND PARTLATERAL MENISECTOMY AND REMOVAL OF MULTIPLE FIXATION DEVICES;  Surgeon: Rodríguez Hamlin MD;  Location:  [facility];  Service: Orthopedics;  Laterality: Left;    KNEE MENISCAL REPAIR Left 10/21/2019    Procedure: knee arthroscopy, debridement of meniscal cyst, and lateral meniscal repair;  Surgeon: Bridger Figueroa MD;  Location:  [facility];  Service: Orthopedics    NERVE BLOCK Left 2021    Procedure: KNEE GENICULAR NERVE BLOCK UNDER FLUORO- x2, 3-4 wks apart;  Surgeon: James Spivey MD;  Location: [facility];  Service: Pain Management;  Laterality: Left;        PT Ortho       Row Name 24 0900       Subjective    Subjective Comments \"My knee is doing much better and the pain I was having in my Quad is gone but I still have pain on the inside of my knee, knee cap and outside of my knee.\"  \"My neck has been bothering me a lot; I think because I had to pack up my room at school and that bothered it.\" \"I feel like I  have a knot that doesn't want to go away.\" Pt reports doing well with exercises. Pt still having trouble sleeping because of neck pain.  -LN       Precautions and Contraindications    Precautions/Limitations no known precautions/limitations  -LN       Subjective Pain    Able to rate subjective pain? yes  -LN    Pre-Treatment Pain Level 4  for left knee; neck= 7/10  -LN       Head/Neck/Trunk    Neck Extension AROM " "WNL- \"pinch\"  -LN    Neck Flexion AROM WNL- mild spine pain  -LN    Neck Lt Lateral Flexion AROM 75%  -LN    Neck Rt Lateral Flexion AROM 25%- pain and tightness  -LN    Neck Lt Rotation AROM 75%  -LN    Neck Rt Rotation AROM 75%  -LN              User Key  (r) = Recorded By, (t) = Taken By, (c) = Cosigned By      Initials Name Provider Type    Bren Rodriguez, PT Physical Therapist                                 PT Assessment/Plan       Row Name 05/31/24 1000          PT Assessment    Assessment Comments Pt with overall decreased L knee pain and she is progressing well with exercises. Neck pain persists but she does have improved CROM in all planes except R lateral flexion still very limited due to pain and tightness. She continues to report benefit from ICTX and able to tolerated increased weight by 2#. She also reports benefit from kinesiotape to L knee. Pt still reports disturbed sleep due to neck pain.  -LN        PT Plan    PT Frequency 1x/week;2x/week  -LN     PT Plan Comments Continue per POC. Progress exercises as tolerated. Modalities PRN. Assess benefit of ICTX and increase weight and time as tolerated. Continue to assess benefit of kinesiotape on L knee. Add corner stretch as tolerated.  -LN               User Key  (r) = Recorded By, (t) = Taken By, (c) = Cosigned By      Initials Name Provider Type    Bren Rodriguez, PT Physical Therapist                     Modalities       Row Name 05/31/24 0900             Moist Heat    MH Applied Yes  -LN      Location Cervical spine & L knee (anterior and posterior) with pt supine in hooklying.  -LN      PT Moist Heat Minutes 12  -LN      MH S/P Rx Yes  -LN         Traction 75774    Traction Type Cervical  with MH across shoulders  -LN      PT Traction Rx Minutes 15  -LN      Duration Intermittent  -LN      Position Hook-lying  -LN      Weight 14  -LN      Hold 60  -LN      Relax 20  -LN         Other Treatment Provided    Taping / Bracing " "Kinesiotape applied today using 1 \"I\" strip beginning below L lateral knee and brought around patella and ended on lateral distal thigh and 1 \"I\" strip begun below medial knee and brought around patella and ended on medial distal thigh and 1 smaller \"I\" strip for spatial correction below knee. Pt instructed in use and care of kinesiotape, including safe removal. Pt to leave tape on up to 5 days and to trim/remove tape as needed.  -LN                User Key  (r) = Recorded By, (t) = Taken By, (c) = Cosigned By      Initials Name Provider Type    LN Bren Shepherd, PT Physical Therapist                   OP Exercises       Row Name 05/31/24 0900             Precautions    Existing Precautions/Restrictions no known precautions/restrictions  -LN         Subjective    Subjective Comments \"My knee is doing much better and the pain I was having in my Quad is gone but I still have pain on the inside of my knee, knee cap and outside of my knee.\"  \"My neck has been bothering me a lot; I think because I had to pack up my room at school and that bothered it.\" \"I feel like I  have a knot that doesn't want to go away.\" Pt reports doing well with exercises. Pt still having trouble sleeping because of neck pain.  -LN         Subjective Pain    Able to rate subjective pain? yes  -LN      Pre-Treatment Pain Level 4  for left knee; neck= 7/10  -LN         Total Minutes    52786 - PT Therapeutic Exercise Minutes 27  -LN         Exercise 1    Exercise Name 1 QS over single towel roll  -LN      Cueing 1 Verbal;Tactile;Demo  -LN      Reps 1 25  -LN      Time 1 5 sec  -LN         Exercise 2    Exercise Name 2 SAQ ball squeeze  -LN      Cueing 2 Verbal;Tactile;Demo  -LN      Reps 2 25  -LN      Time 2 3-5 sec  -LN         Exercise 3    Exercise Name 3 SLR  -LN      Cueing 3 Verbal;Tactile;Demo  -LN      Reps 3 25  -LN      Time 3 2 sec  -LN      Additional Comments 1#  -LN         Exercise 4    Exercise Name 4 sidelying hip " abduction  -LN      Cueing 4 Verbal;Tactile;Demo  -LN      Reps 4 25  -LN      Time 4 2 sec  -LN      Additional Comments 1#  -LN         Exercise 5    Exercise Name 5 sidelying hip adduction  -LN      Cueing 5 Verbal;Tactile;Demo  -LN      Reps 5 25  -LN      Time 5 2 sec  -LN      Additional Comments 1#  -LN         Exercise 6    Exercise Name 6 Active cervical SB/UT stretch  -LN      Cueing 6 Verbal;Tactile;Demo  -LN      Additional Comments HEP  -LN         Exercise 7    Exercise Name 7 Levator scapula stretch  -LN      Cueing 7 Verbal;Tactile;Demo  -LN      Additional Comments HEP  -LN         Exercise 8    Exercise Name 8 Scapular retraction  -LN      Cueing 8 Verbal;Tactile;Demo  -LN      Additional Comments HEP  -LN         Exercise 9    Exercise Name 9 hooklying ball squeeze  -LN      Cueing 9 Verbal;Tactile;Demo  -LN      Reps 9 25  -LN      Time 9 5 sec  -LN         Exercise 10    Exercise Name 10 heel raises  -LN      Cueing 10 Verbal  -LN      Reps 10 25  -LN         Exercise 11    Exercise Name 11 TKE vs TB  -LN      Cueing 11 Verbal;Demo  -LN      Reps 11 25  -LN      Time 11 blue TB  -LN         Exercise 12    Exercise Name 12 Prone leg raises  -LN      Cueing 12 Verbal;Tactile;Demo  -LN      Reps 12 25  -LN      Additional Comments 1#  -LN                User Key  (r) = Recorded By, (t) = Taken By, (c) = Cosigned By      Initials Name Provider Type    Bren Rodriguez, PT Physical Therapist                                     Therapy Education  Education Details: Pt to add PLR to HEP as tolerated; use MH/CP PRN. Pt to use 1# with 4 way leg lifts as tolerated. Reviewed use and care of kinesiotape, including safe removal.  Given: HEP, Symptoms/condition management, Pain management, Posture/body mechanics, Other (comment) (Kinesiotape)  Program: New, Reinforced, Progressed (added PLR)  How Provided: Verbal, Demonstration  Provided to: Patient  Level of Understanding: Teach back education  performed, Verbalized, Demonstrated              Time Calculation:   Start Time: 1000  Stop Time: 1105  Time Calculation (min): 65 min  Timed Charges  63558 - PT Therapeutic Exercise Minutes: 27  Untimed Charges  PT Traction Rx Minutes: 15  PT Moist Heat Minutes: 12  Total Minutes  Timed Charges Total Minutes: 27  Untimed Charges Total Minutes: 27   Total Minutes: 54  Therapy Charges for Today       Code Description Service Date Service Provider Modifiers Qty    19179786788 HC PT THER PROC EA 15 MIN 5/31/2024 Bren Shepherd, PT GP 2    06345034032 HC PT TRACTION CERVICAL 5/31/2024 Bren Shepherd, PT GP 1                      Bren Shepherd, PT  5/31/2024

## 2024-06-03 ENCOUNTER — HOSPITAL ENCOUNTER (OUTPATIENT)
Dept: OCCUPATIONAL THERAPY | Facility: HOSPITAL | Age: 54
Setting detail: THERAPIES SERIES
Discharge: HOME | End: 2024-06-03
Payer: OTHER MISCELLANEOUS

## 2024-06-03 DIAGNOSIS — M79.642 LEFT HAND PAIN: Primary | ICD-10-CM

## 2024-06-03 DIAGNOSIS — M25.532 LEFT WRIST PAIN: ICD-10-CM

## 2024-06-03 PROCEDURE — 97033 APP MDLTY 1+IONTPHRSIS EA 15: CPT

## 2024-06-03 PROCEDURE — 97530 THERAPEUTIC ACTIVITIES: CPT

## 2024-06-03 NOTE — THERAPY TREATMENT NOTE
Outpatient Occupational Therapy Ortho Treatment Note  ALEXANDER [facility]eneg[facility]     Patient Name: Jocelyn Ríos  : 1970  MRN: [id number]  Today's Date: 6/3/2024        Visit Date: 2024    Patient Active Problem List   Diagnosis    Anxiety    Moderate persistent asthma without complication    Environmental allergies    Routine adult health maintenance    Complex regional pain syndrome i of left lower limb    Carpal tunnel syndrome on both sides    Status post arthroscopic surgery of left knee, DOS 10/21/2019    Intractable neuropathic pain of left knee    Chronic cervical pain    Chondromalacia of knee, left    Screening mammogram for breast cancer    Moderate mixed hyperlipidemia not requiring statin therapy        Past Medical History:   Diagnosis Date    Acute sinus infection     on poab    Anxiety     Arthritis     Asthma     STARTED @ 41 YRS OLD, DR. BUENO, & [facility] ALLERGY DR. LOPEZ    COVID-19     2021    CRPS (complex regional pain syndrome) type I of lower limb     Elevated cholesterol     Frequent UTI     GERD (gastroesophageal reflux disease)     History of 2019 novel coronavirus disease (COVID-19)     Iliotibial band syndrome affecting left lower leg     Itching     BLE has some scratches to notify MD if doesn't clear up prior to surgery    Kidney stones     Lateral collateral ligament deficiency of left knee 2016    Lateral meniscal tear     Lumbar paraspinal muscle spasm 2017    Meniscal cyst, left 2019    Migraines     Patellofemoral pain syndrome of left knee 2019    PONV (postoperative nausea and vomiting)     Seasonal allergies     Subchondral insufficiency fracture of femoral condyle, left, initial encounter 01/10/2020    Tear of lateral meniscus of left knee, current 2019        Past Surgical History:   Procedure Laterality Date     SECTION      x2    DIAGNOSTIC LAPAROSCOPY      FOOT SURGERY Right     HERNIA REPAIR Bilateral      inguinal hernia age 6    INTRAUTERINE DEVICE INSERTION      KNEE ARTHROSCOPY Left 10/21/2016    Procedure: KNEE ARTHROSCOPY debridement of lateral meniscal tear;  Surgeon: Son Mojica MD;  Location:  [facility];  Service:     KNEE ARTHROSCOPY Left 7/27/2018    Procedure: KNEE ARTHROSCOPY;  Surgeon: Son Mojica MD;  Location:  [facility];  Service: Orthopedics    KNEE ARTHROSCOPY Left 12/2/2021    Procedure: LEFT KNEE DIAGNOSTIC  ARTHROSCOPY AND PARTLATERAL MENISECTOMY AND REMOVAL OF MULTIPLE FIXATION DEVICES;  Surgeon: Rodríguez Hamlin MD;  Location:  [facility];  Service: Orthopedics;  Laterality: Left;    KNEE MENISCAL REPAIR Left 10/21/2019    Procedure: knee arthroscopy, debridement of meniscal cyst, and lateral meniscal repair;  Surgeon: Bridger Figueroa MD;  Location:  [facility];  Service: Orthopedics    NERVE BLOCK Left 5/25/2021    Procedure: KNEE GENICULAR NERVE BLOCK UNDER FLUORO- x2, 3-4 wks apart;  Surgeon: James Spivey MD;  [place] [facility] [place]  Service: Pain Management;  Laterality: Left;         Visit Dx:    ICD-10-CM ICD-9-CM   1. Left hand pain  M79.642 729.5   2. Left wrist pain  M25.532 719.43           Hand Therapy (Last 24 Hours)       Hand Eval       Row Name 06/03/24 1100              Strength Left    # Reps 1  -EN      Left Rung 2  -EN      Left  Test 1 45  -EN       Strength Average Left 45  -EN                User Key  (r) = Recorded By, (t) = Taken By, (c) = Cosigned By      Initials Name Provider Type    EN Natalia Barrios, OTR Occupational Therapist                        Therapy Education  Education Details: Progressed to 4 pounds  Given: HEP, Symptoms/condition management, Edema management  Program: Reinforced, Progressed  How Provided: Verbal, Demonstration  Provided to: Patient  Level of Understanding: Teach back education performed, Verbalized, Demonstrated     OT Assessment/Plan       Row Name 06/03/24 1303          OT Assessment    Functional  Limitations Limitation in home management;Performance in leisure activities;Performance in self-care ADL;Performance in work activities  -EN     Impairments Edema;Muscle strength;Pain;Sensation  -EN     Assessment Comments Patient demonstrated significant improvement in  strength. Reports cool comfort splint helped significantly during IADL activities. Patient progressing well with strengthening program.  -EN     OT Diagnosis Left hand/wrist pain, decreased strength, decreased ADL/IADL independence  -EN     OT Rehab Potential Good  -EN        OT Plan    OT Plan Comments Continue with goals.  -EN               User Key  (r) = Recorded By, (t) = Taken By, (c) = Cosigned By      Initials Name Provider Type    Natalia Bettencourt OTR Occupational Therapist                      OT Goals       Row Name 06/03/24 1200          OT Short Term Goals    STG Date to Achieve 06/07/24  -EN     STG 1 Patient will demonstrate independence with HEP/edema management.  -EN     STG 1 Progress Met  -EN     STG 2 Patient will report decreased L wrist/hand pain with use by 50%.  -EN     STG 2 Progress Ongoing;Progressing  -EN     STG 2 Progress Comments reports cool comfort brace has helped significantly  -EN     STG 3 Patient will demonstrate improved left  strength 5# for improved grasp of objects.  -EN     STG 3 Progress Met  -EN        Long Term Goals    LTG Date to Achieve 06/21/24  -EN     LTG 1 Patient will demonstrate 5/5 wrist/forearm strength for improved ADL/IADL independence.  -EN     LTG 1 Progress Met  -EN     LTG 2 Patient will demonstrate improved L  strength by 15# or more for improved grasp of objects.  -EN     LTG 2 Progress Met  -EN     LTG 3 Improved Quick Dash 40 points or more.  -EN     LTG 3 Progress Progressing;Ongoing  -EN     LTG 4 Patient will report decreased pain with use by 75%.  -EN     LTG 4 Progress Progressing;Ongoing  -EN               User Key  (r) = Recorded By, (t) = Taken By, (c) =  Cosigned By      Initials Name Provider Type    Natalia Bettencourt OTR Occupational Therapist                     Modalities       Row Name 06/03/24 1100             Moist Heat    MH Applied Yes  -EN      Location left wrist/hand  -EN      OT Moist Heat Minutes 15  -EN      MH S/P Rx Yes  -EN         Iontophoresis 32448    Milliamps 4  radial wrist (first dorsal compartment)  -EN      MA/Min 40  -EN      Dexamethasone used Yes  -EN      Patch Type Medium  -EN      82330 - OT Iontophoresis Minutes 10  -EN                User Key  (r) = Recorded By, (t) = Taken By, (c) = Cosigned By      Initials Name Provider Type    EN Natalia Barrios OTR Occupational Therapist                   OT Exercises       Row Name 06/03/24 1100             Exercise 1    Exercise Name 1 retrograde edema massage left hand/wrist  -EN         Exercise 2    Exercise Name 2 gentle ROM exercises  -EN         Exercise 3    Exercise Name 3 light wrist strengthening with one pound dumbell- inner range flexion, extension, RD, UD  -EN      Cueing 3 Verbal;Tactile;Demo  -EN      Equipment 3 Dumbell  -EN      Weights/Plates 3 3  one set of 10 with 4#  -EN      Sets 3 1  -EN      Reps 3 15  -EN         Exercise 4    Exercise Name 4 light hand strengthening  -EN      Cueing 4 Verbal;Tactile;Demo  -EN      Equipment 4 Hand Gripper  -EN      Weights/Plates 4 25  -EN      Sets 4 2  -EN      Reps 4 20  hold 3 seconds  -EN                User Key  (r) = Recorded By, (t) = Taken By, (c) = Cosigned By      Initials Name Provider Type    Natalia Bettencourt OTR Occupational Therapist                                  Time Calculation:   OT Start Time: 1100  OT Stop Time: 1200  OT Time Calculation (min): 60 min  Timed Charges  85930 - OT Iontophoresis Minutes: 10  98363 - OT Therapeutic Activity Minutes: 30  Untimed Charges  OT Moist Heat Minutes: 15  Total Minutes  Timed Charges Total Minutes: 30  Untimed Charges Total Minutes: 15   Total Minutes:  30     Therapy Charges for Today       Code Description Service Date Service Provider Modifiers Qty    40516220027 HC OT IONTOPHORESIS EA 15 MIN 6/3/2024 Natalia Barrios OTR GO 1    51114095808 HC OT THERAPEUTIC ACT EA 15 MIN 6/3/2024 Natalia Barrios OTR GO 2    44854748041 HC OT HOT OR COLD PACK TREAT MCARE 6/3/2024 Natalia Barrios OTR GO 1                      ERA Stoner  6/3/2024

## 2024-06-06 ENCOUNTER — HOSPITAL ENCOUNTER (OUTPATIENT)
Dept: PHYSICAL THERAPY | Facility: HOSPITAL | Age: 54
Setting detail: THERAPIES SERIES
Discharge: HOME | End: 2024-06-06
Payer: OTHER MISCELLANEOUS

## 2024-06-06 DIAGNOSIS — M54.2 NECK PAIN: Primary | ICD-10-CM

## 2024-06-06 DIAGNOSIS — M25.562 LEFT KNEE PAIN, UNSPECIFIED CHRONICITY: ICD-10-CM

## 2024-06-06 PROCEDURE — 97110 THERAPEUTIC EXERCISES: CPT

## 2024-06-06 PROCEDURE — 97012 MECHANICAL TRACTION THERAPY: CPT

## 2024-06-06 NOTE — THERAPY TREATMENT NOTE
Outpatient Physical Therapy Ortho Treatment Note  [facility]     Patient Name: Jocelyn Ríos  : 1970  MRN: [id number]  Today's Date: 2024      Visit Date: 2024    Visit Dx:    ICD-10-CM ICD-9-CM   1. Neck pain  M54.2 723.1   2. Left knee pain, unspecified chronicity  M25.562 719.46       Patient Active Problem List   Diagnosis    Anxiety    Moderate persistent asthma without complication    Environmental allergies    Routine adult health maintenance    Complex regional pain syndrome i of left lower limb    Carpal tunnel syndrome on both sides    Status post arthroscopic surgery of left knee, DOS 10/21/2019    Intractable neuropathic pain of left knee    Chronic cervical pain    Chondromalacia of knee, left    Screening mammogram for breast cancer    Moderate mixed hyperlipidemia not requiring statin therapy        Past Medical History:   Diagnosis Date    Acute sinus infection     on poab    Anxiety     Arthritis     Asthma     STARTED @ 41 YRS OLD, DR. BUENO, & [facility] ALLERGY DR. LOPEZ    COVID-19     2021    CRPS (complex regional pain syndrome) type I of lower limb     Elevated cholesterol     Frequent UTI     GERD (gastroesophageal reflux disease)     History of 2019 novel coronavirus disease (COVID-19)     Iliotibial band syndrome affecting left lower leg     Itching     BLE has some scratches to notify MD if doesn't clear up prior to surgery    Kidney stones     Lateral collateral ligament deficiency of left knee 2016    Lateral meniscal tear     Lumbar paraspinal muscle spasm 2017    Meniscal cyst, left 2019    Migraines     Patellofemoral pain syndrome of left knee 2019    PONV (postoperative nausea and vomiting)     Seasonal allergies     Subchondral insufficiency fracture of femoral condyle, left, initial encounter 01/10/2020    Tear of lateral meniscus of left knee, current 2019        Past Surgical History:   Procedure Laterality  Date     SECTION      x2    DIAGNOSTIC LAPAROSCOPY      FOOT SURGERY Right     HERNIA REPAIR Bilateral     inguinal hernia age 6    INTRAUTERINE DEVICE INSERTION      KNEE ARTHROSCOPY Left 10/21/2016    Procedure: KNEE ARTHROSCOPY debridement of lateral meniscal tear;  Surgeon: Son Mojica MD;  Location:  [facility];  Service:     KNEE ARTHROSCOPY Left 2018    Procedure: KNEE ARTHROSCOPY;  Surgeon: Son Mojica MD;  Location:  [facility];  Service: Orthopedics    KNEE ARTHROSCOPY Left 2021    Procedure: LEFT KNEE DIAGNOSTIC  ARTHROSCOPY AND PARTLATERAL MENISECTOMY AND REMOVAL OF MULTIPLE FIXATION DEVICES;  Surgeon: Rodríguez Hamlin MD;  Location:  [facility];  Service: Orthopedics;  Laterality: Left;    KNEE MENISCAL REPAIR Left 10/21/2019    Procedure: knee arthroscopy, debridement of meniscal cyst, and lateral meniscal repair;  Surgeon: Bridger Figueroa MD;  Location:  [facility];  Service: Orthopedics    NERVE BLOCK Left 2021    Procedure: KNEE GENICULAR NERVE BLOCK UNDER FLUORO- x2, 3-4 wks apart;  Surgeon: James Spivey MD;  Location: [facility] OR;  Service: Pain Management;  Laterality: Left;        PT Ortho       Row Name 24 1000       Subjective    Subjective Comments pt reports increased tolerance to daily chores, walking and standing; states she has noticed increased CROM toward the left but still limited toward the right; continues to get relief from tape  -              User Key  (r) = Recorded By, (t) = Taken By, (c) = Cosigned By      Initials Name Provider Type     Alec Ribera, EDD Physical Therapist Assistant                                 PT Assessment/Plan       Row Name 24 1406          PT Assessment    Assessment Comments pt continues to report overall improved symptoms but still with decreased CROM toward the right; tolerated progression of reps with (L) knee ex's well and continues to report relief with kinesiotape  -        PT Plan    PT  "Plan Comments Cont per POC, add corner stretch as tolerated  -               User Key  (r) = Recorded By, (t) = Taken By, (c) = Cosigned By      Initials Name Provider Type    ARJUN Alec Ribera PTA Physical Therapist Assistant                     Modalities       Row Name 06/06/24 1000 06/06/24 0900          Precautions    Existing Precautions/Restrictions no known precautions/restrictions  -MH --  -MH        Moist Heat    Location Cervical spine & L knee (anterior and posterior) with pt supine in hooklying.  -MH --  -MH     PT Moist Heat Minutes 12  -MH --  -MH     MH S/P Rx Yes  -MH --  -MH        Traction 31528    Traction Type Cervical  with MH across shoulders  -MH --  -MH     PT Traction Rx Minutes 15  -MH --  -MH     Position Hook-lying  -MH --  -MH     Weight 14  -MH --  -MH     Hold 60  -MH --  -MH     Relax 20  -MH --  -MH        Other Treatment Provided    Taping / Bracing Kinesiotape applied today using 1 \"I\" strip beginning below (L) lateral knee and brought around patella and ended on medial distal thigh and 1 \"I\" strip begun below medial knee and brought around patella and ended on lateral distal thigh and 1 smaller \"I\" strip for spatial correction below knee. Pt instructed in use and care of kinesiotape, including safe removal. Pt to leave tape on up to 5 days and to trim/remove tape as needed.  -MH --  -MH               User Key  (r) = Recorded By, (t) = Taken By, (c) = Cosigned By      Initials Name Provider Type    ARJUN Alec Ribera PTA Physical Therapist Assistant                   OP Exercises       Row Name 06/06/24 1409 06/06/24 1000 06/06/24 0900       Precautions    Existing Precautions/Restrictions -- no known precautions/restrictions  -MH --  -MH       Subjective    Subjective Comments -- pt reports increased tolerance to daily chores, walking and standing; states she has noticed increased CROM toward the left but still limited toward the right; continues to get relief from tape  - " --       Total Minutes    70856 - PT Therapeutic Exercise Minutes 25  -MH -- --       Exercise 1    Exercise Name 1 -- QS over single towel roll  -MH --  -MH    Cueing 1 -- Verbal;Tactile;Demo  -MH --  -MH    Reps 1 -- 25  -MH --  -MH    Time 1 -- 5 sec  -MH --  -MH       Exercise 2    Exercise Name 2 -- SAQ ball squeeze  -MH --  -MH    Cueing 2 -- Verbal;Tactile;Demo  -MH --  -MH    Reps 2 -- 30  -MH --  -MH    Time 2 -- 3-5 sec  -MH --  -MH       Exercise 3    Exercise Name 3 -- SLR  -MH --  -MH    Cueing 3 -- Verbal;Tactile;Demo  -MH --  -MH    Sets 3 -- 2  -MH --  -MH    Reps 3 -- 15  -MH --  -MH    Time 3 -- 1#  -MH --  -MH       Exercise 4    Exercise Name 4 -- sidelying hip abduction  -MH --  -MH    Cueing 4 -- Verbal;Tactile;Demo  -MH --  -MH    Sets 4 -- 2  -MH --  -MH    Reps 4 -- 15  -MH --  -MH    Time 4 -- 1#  -MH --  -MH       Exercise 5    Exercise Name 5 -- sidelying hip adduction  -MH --  -MH    Cueing 5 -- Verbal;Tactile;Demo  -MH --  -MH    Sets 5 -- 2  -MH --  -MH    Reps 5 -- 15  -MH --  -MH    Time 5 -- 1#  -MH --  -MH       Exercise 6    Exercise Name 6 -- Active cervical SB/UT stretch  -MH --  -MH       Exercise 7    Exercise Name 7 -- Levator scapula stretch  -MH --  -MH       Exercise 8    Exercise Name 8 -- Scapular retraction  -MH --  -MH       Exercise 9    Exercise Name 9 -- hooklying ball squeeze  -MH --  -MH    Cueing 9 -- Verbal;Tactile;Demo  -MH --  -MH    Reps 9 -- 30  -MH --  -MH    Time 9 -- 5 sec  -MH --  -MH       Exercise 10    Exercise Name 10 -- heel raises  -MH --  -MH    Cueing 10 -- Verbal  -MH --  -MH    Reps 10 -- 25  -MH --  -MH       Exercise 11    Exercise Name 11 -- TKE vs TB  -MH --  -MH    Cueing 11 -- Verbal;Demo  -MH --  -MH    Reps 11 -- 30  -MH --  -MH    Time 11 -- blue TB  -MH --  -       Exercise 12    Exercise Name 12 -- Prone leg raises  -MH --  -MH    Cueing 12 -- Verbal;Tactile;Demo  -MH --  -MH    Reps 12 -- 2 x 15  -MH --  -MH    Time 12 -- 1#  -MH  --  -              User Key  (r) = Recorded By, (t) = Taken By, (c) = Cosigned By      Initials Name Provider Type     Alec Ribera PTA Physical Therapist Assistant                                     Therapy Education  Given: HEP, Symptoms/condition management  Program: Reinforced  How Provided: Verbal, Demonstration  Provided to: Patient  Level of Understanding: Teach back education performed, Verbalized, Demonstrated              Time Calculation:   Start Time: 1000  Stop Time: 1108  Time Calculation (min): 68 min  Timed Charges  59385 - PT Therapeutic Exercise Minutes: 25  Untimed Charges  PT Traction Rx Minutes: 15  PT Moist Heat Minutes: 12  Total Minutes  Timed Charges Total Minutes: 25  Untimed Charges Total Minutes: 27   Total Minutes: 25  Therapy Charges for Today       Code Description Service Date Service Provider Modifiers Qty    92106653151 HC PT THER PROC EA 15 MIN 6/6/2024 Alec Ribera PTA GP 2    33471751108 HC PT TRACTION CERVICAL 6/6/2024 Alec Ribera PTA GP 1                      Alec Ribera PTA  6/6/2024

## 2024-06-12 ENCOUNTER — HOSPITAL ENCOUNTER (OUTPATIENT)
Dept: PHYSICAL THERAPY | Facility: HOSPITAL | Age: 54
Setting detail: THERAPIES SERIES
Discharge: HOME | End: 2024-06-12
Payer: OTHER MISCELLANEOUS

## 2024-06-12 ENCOUNTER — HOSPITAL ENCOUNTER (OUTPATIENT)
Dept: OCCUPATIONAL THERAPY | Facility: HOSPITAL | Age: 54
Setting detail: THERAPIES SERIES
Discharge: HOME | End: 2024-06-12
Payer: OTHER MISCELLANEOUS

## 2024-06-12 DIAGNOSIS — M25.532 LEFT WRIST PAIN: ICD-10-CM

## 2024-06-12 DIAGNOSIS — M54.2 NECK PAIN: Primary | ICD-10-CM

## 2024-06-12 DIAGNOSIS — M25.562 LEFT KNEE PAIN, UNSPECIFIED CHRONICITY: ICD-10-CM

## 2024-06-12 DIAGNOSIS — M79.642 LEFT HAND PAIN: Primary | ICD-10-CM

## 2024-06-12 PROCEDURE — 97530 THERAPEUTIC ACTIVITIES: CPT

## 2024-06-12 PROCEDURE — 97012 MECHANICAL TRACTION THERAPY: CPT

## 2024-06-12 PROCEDURE — 97033 APP MDLTY 1+IONTPHRSIS EA 15: CPT

## 2024-06-12 PROCEDURE — 97110 THERAPEUTIC EXERCISES: CPT

## 2024-06-12 NOTE — THERAPY TREATMENT NOTE
Outpatient Physical Therapy Ortho Treatment Note  [facility]     Patient Name: Jocelyn Ríos  : 1970  MRN: [id number]  Today's Date: 2024      Visit Date: 2024    Visit Dx:    ICD-10-CM ICD-9-CM   1. Neck pain  M54.2 723.1   2. Left knee pain, unspecified chronicity  M25.562 719.46       Patient Active Problem List   Diagnosis    Anxiety    Moderate persistent asthma without complication    Environmental allergies    Routine adult health maintenance    Complex regional pain syndrome i of left lower limb    Carpal tunnel syndrome on both sides    Status post arthroscopic surgery of left knee, DOS 10/21/2019    Intractable neuropathic pain of left knee    Chronic cervical pain    Chondromalacia of knee, left    Screening mammogram for breast cancer    Moderate mixed hyperlipidemia not requiring statin therapy        Past Medical History:   Diagnosis Date    Acute sinus infection     on poab    Anxiety     Arthritis     Asthma     STARTED @ 41 YRS OLD, DR. BUENO, & [facility] ALLERGY DR. LOPEZ    COVID-19     2021    CRPS (complex regional pain syndrome) type I of lower limb     Elevated cholesterol     Frequent UTI     GERD (gastroesophageal reflux disease)     History of 2019 novel coronavirus disease (COVID-19)     Iliotibial band syndrome affecting left lower leg     Itching     BLE has some scratches to notify MD if doesn't clear up prior to surgery    Kidney stones     Lateral collateral ligament deficiency of left knee 2016    Lateral meniscal tear     Lumbar paraspinal muscle spasm 2017    Meniscal cyst, left 2019    Migraines     Patellofemoral pain syndrome of left knee 2019    PONV (postoperative nausea and vomiting)     Seasonal allergies     Subchondral insufficiency fracture of femoral condyle, left, initial encounter 01/10/2020    Tear of lateral meniscus of left knee, current 2019        Past Surgical History:   Procedure  Laterality Date     SECTION      x2    DIAGNOSTIC LAPAROSCOPY      FOOT SURGERY Right     HERNIA REPAIR Bilateral     inguinal hernia age 6    INTRAUTERINE DEVICE INSERTION      KNEE ARTHROSCOPY Left 10/21/2016    Procedure: KNEE ARTHROSCOPY debridement of lateral meniscal tear;  Surgeon: Son Mojica MD;  Location:  [facility];  Service:     KNEE ARTHROSCOPY Left 2018    Procedure: KNEE ARTHROSCOPY;  Surgeon: Son Mojica MD;  Location:  [facility];  Service: Orthopedics    KNEE ARTHROSCOPY Left 2021    Procedure: LEFT KNEE DIAGNOSTIC  ARTHROSCOPY AND PARTLATERAL MENISECTOMY AND REMOVAL OF MULTIPLE FIXATION DEVICES;  Surgeon: Rodríguez Hamlin MD;  Location:  [facility];  Service: Orthopedics;  Laterality: Left;    KNEE MENISCAL REPAIR Left 10/21/2019    Procedure: knee arthroscopy, debridement of meniscal cyst, and lateral meniscal repair;  Surgeon: Bridger Figueroa MD;  Location:  [facility];  Service: Orthopedics    NERVE BLOCK Left 2021    Procedure: KNEE GENICULAR NERVE BLOCK UNDER FLUORO- x2, 3-4 wks apart;  Surgeon: James Spivey MD;  Location: [facility] OR;  Service: Pain Management;  Laterality: Left;        PT Ortho       Row Name 24 1500       Subjective    Subjective Comments Pt reports that she just recently traveled by car to [place] and also was on her feet more while she was gone, so her knee was more sore. Pt continues to report neck pain but reports the traction helps a lot. She reports no issues with HEP.  -LN              User Key  (r) = Recorded By, (t) = Taken By, (c) = Cosigned By      Initials Name Provider Type    Bren Rodriguez, PT Physical Therapist                                 PT Assessment/Plan       Row Name 24 1800          PT Assessment    Assessment Comments Pt with increased knee pain after driving to [place] and being up on her feet more, but she tolerated exercises well today and able to increase resistance without any  complaints. Pt still with c/o neck pain but reports relief with ICTX & reported her neck felt really good after traction today.  -LN        PT Plan    PT Frequency 1x/week;2x/week  -LN     PT Plan Comments Cont per POC, add corner stretch as tolerated  -LN               User Key  (r) = Recorded By, (t) = Taken By, (c) = Cosigned By      Initials Name Provider Type    Bren Rodriguez, PT Physical Therapist                     Modalities       Row Name 06/12/24 1500             Moist Heat    MH Applied Yes  -LN      Location Cervical spine & L knee (anterior and posterior) with pt supine in hooklying.  -LN      PT Moist Heat Minutes 12  -LN      MH S/P Rx Yes  after exercises and before ICTX  -LN         Traction 87930    Traction Type Cervical  with MH across shoulders  -LN      PT Traction Rx Minutes 15  -LN      Position Hook-lying  -LN      Weight 15  -LN      Hold 60  -LN      Relax 20  -LN                User Key  (r) = Recorded By, (t) = Taken By, (c) = Cosigned By      Initials Name Provider Type    Bren Rodriguez, PT Physical Therapist                   OP Exercises       Row Name 06/12/24 1500             Precautions    Existing Precautions/Restrictions no known precautions/restrictions  -LN         Subjective    Subjective Comments Pt reports that she just recently traveled by car to [place] and also was on her feet more while she was gone, so her knee was more sore. Pt continues to report neck pain but reports the traction helps a lot. She reports no issues with HEP.  -LN         Subjective Pain    Pre-Treatment Pain Level 4  -LN      Subjective Pain Comment neck= 5/10  -LN         Total Minutes    27356 - PT Therapeutic Exercise Minutes 25  -LN         Exercise 1    Exercise Name 1 QS over single towel roll  -LN      Cueing 1 Verbal;Tactile;Demo  -LN      Reps 1 30  -LN      Time 1 5 sec  -LN         Exercise 2    Exercise Name 2 SAQ ball squeeze  -LN      Cueing 2  Verbal;Tactile;Demo  -LN      Reps 2 30  -LN      Time 2 3-5 sec  -LN         Exercise 3    Exercise Name 3 SLR  -LN      Cueing 3 Verbal;Tactile;Demo  -LN      Sets 3 2  -LN      Reps 3 15  -LN      Time 3 1.5#  -LN         Exercise 4    Exercise Name 4 sidelying hip abduction  -LN      Cueing 4 Verbal;Tactile;Demo  -LN      Sets 4 2  -LN      Reps 4 15  -LN      Time 4 1.5#  -LN         Exercise 5    Exercise Name 5 sidelying hip adduction  -LN      Cueing 5 Verbal;Tactile;Demo  -LN      Sets 5 2  -LN      Reps 5 15  -LN      Time 5 1.5#  -LN         Exercise 6    Exercise Name 6 Active cervical SB/UT stretch  -LN         Exercise 7    Exercise Name 7 Levator scapula stretch  -LN         Exercise 8    Exercise Name 8 Scapular retraction  -LN         Exercise 9    Exercise Name 9 hooklying ball squeeze  -LN      Cueing 9 Verbal;Tactile;Demo  -LN      Reps 9 30  -LN      Time 9 5 sec  -LN         Exercise 10    Exercise Name 10 heel raises  -LN      Cueing 10 Verbal  -LN      Reps 10 30  -LN         Exercise 11    Exercise Name 11 TKE vs TB  -LN      Cueing 11 Verbal;Demo  -LN      Reps 11 30  -LN      Time 11 black TB  -LN         Exercise 12    Exercise Name 12 Prone leg raises  -LN      Cueing 12 Verbal;Tactile;Demo  -LN      Reps 12 2 x 15  -LN      Time 12 1.5#  -LN         Exercise 13    Exercise Name 13 --  -LN      Cueing 13 --  -LN      Reps 13 --  -LN      Time 13 --  -LN                User Key  (r) = Recorded By, (t) = Taken By, (c) = Cosigned By      Initials Name Provider Type    Bren Rodriguez, PT Physical Therapist                                     Therapy Education  Given: HEP, Symptoms/condition management, Edema management, Pain management  Program: Reinforced, Progressed  How Provided: Verbal, Demonstration  Provided to: Patient  Level of Understanding: Teach back education performed, Verbalized, Demonstrated              Time Calculation:   Start Time: 1505  Stop Time: 1605  Time  Calculation (min): 60 min  Timed Charges  02212 - PT Therapeutic Exercise Minutes: 25  Untimed Charges  PT Traction Rx Minutes: 15  PT Moist Heat Minutes: 12  Total Minutes  Timed Charges Total Minutes: 25  Untimed Charges Total Minutes: 27   Total Minutes: 52  Therapy Charges for Today       Code Description Service Date Service Provider Modifiers Qty    56700503092 HC PT THER PROC EA 15 MIN 6/12/2024 Bren Shepherd, PT GP 2    27730796532 HC PT TRACTION CERVICAL 6/12/2024 Bren Shepherd, PT GP 1                      Bren Shepherd, PT  6/12/2024

## 2024-06-12 NOTE — THERAPY TREATMENT NOTE
Outpatient Occupational Therapy Ortho Treatment Note  [facility]     Patient Name: Jocelyn Ríos  : 1970  MRN: [id number]  Today's Date: 2024        Visit Date: 2024    Patient Active Problem List   Diagnosis    Anxiety    Moderate persistent asthma without complication    Environmental allergies    Routine adult health maintenance    Complex regional pain syndrome i of left lower limb    Carpal tunnel syndrome on both sides    Status post arthroscopic surgery of left knee, DOS 10/21/2019    Intractable neuropathic pain of left knee    Chronic cervical pain    Chondromalacia of knee, left    Screening mammogram for breast cancer    Moderate mixed hyperlipidemia not requiring statin therapy        Past Medical History:   Diagnosis Date    Acute sinus infection     on poab    Anxiety     Arthritis     Asthma     STARTED @ 41 YRS OLD, DR. BUENO, & [facility] ALLERGY DR. LOPEZ    COVID-19     2021    CRPS (complex regional pain syndrome) type I of lower limb     Elevated cholesterol     Frequent UTI     GERD (gastroesophageal reflux disease)     History of 2019 novel coronavirus disease (COVID-19)     Iliotibial band syndrome affecting left lower leg     Itching     BLE has some scratches to notify MD if doesn't clear up prior to surgery    Kidney stones     Lateral collateral ligament deficiency of left knee 2016    Lateral meniscal tear     Lumbar paraspinal muscle spasm 2017    Meniscal cyst, left 2019    Migraines     Patellofemoral pain syndrome of left knee 2019    PONV (postoperative nausea and vomiting)     Seasonal allergies     Subchondral insufficiency fracture of femoral condyle, left, initial encounter 01/10/2020    Tear of lateral meniscus of left knee, current 2019        Past Surgical History:   Procedure Laterality Date     SECTION      x2    DIAGNOSTIC LAPAROSCOPY      FOOT SURGERY Right     HERNIA REPAIR Bilateral      "inguinal hernia age 6    INTRAUTERINE DEVICE INSERTION      KNEE ARTHROSCOPY Left 10/21/2016    Procedure: KNEE ARTHROSCOPY debridement of lateral meniscal tear;  Surgeon: Son Mojica MD;  Location:  [facility];  Service:     KNEE ARTHROSCOPY Left 7/27/2018    Procedure: KNEE ARTHROSCOPY;  Surgeon: Son Mojica MD;  Location:  [facility];  Service: Orthopedics    KNEE ARTHROSCOPY Left 12/2/2021    Procedure: LEFT KNEE DIAGNOSTIC  ARTHROSCOPY AND PARTLATERAL MENISECTOMY AND REMOVAL OF MULTIPLE FIXATION DEVICES;  Surgeon: Rodríguez Hamlin MD;  Location:  [facility];  Service: Orthopedics;  Laterality: Left;    KNEE MENISCAL REPAIR Left 10/21/2019    Procedure: knee arthroscopy, debridement of meniscal cyst, and lateral meniscal repair;  Surgeon: Bridger Figueroa MD;  Location:  [facility];  Service: Orthopedics    NERVE BLOCK Left 5/25/2021    Procedure: KNEE GENICULAR NERVE BLOCK UNDER FLUORO- x2, 3-4 wks apart;  Surgeon: James Spivey MD;  Location: [facility] [place] [facility];  Service: Pain Management;  Laterality: Left;         Visit Dx:    ICD-10-CM ICD-9-CM   1. Left hand pain  M79.642 729.5   2. Left wrist pain  M25.532 719.43           Hand Therapy (Last 24 Hours)       Hand Eval       Row Name 06/12/24 1400             Subjective    Subjective Comments Patient reports she is doing better.  -EN         Subjective Pain    Pre-Treatment Pain Level 0  -EN      Subjective Pain Comment Reports the highest pain has reached is 4-5/10. \"It hurt after holding the grandbaby...sweeping with a broom.\"  -EN          Strength Left    # Reps 1  -EN      Left Rung 2  -EN      Left  Test 1 49  -EN       Strength Average Left 49  -EN         Left Hand Strength - Pinch (lbs)    Lateral 13 lbs  -EN         Therapy Education    Given HEP;Symptoms/condition management;Edema management  -EN      Program Reinforced;Progressed  -EN      How Provided Verbal  -EN      Provided to Patient  -EN      Level of Understanding Teach back " education performed;Verbalized;Demonstrated  -EN                User Key  (r) = Recorded By, (t) = Taken By, (c) = Cosigned By      Initials Name Provider Type    Natalia Bettencourt OTR Occupational Therapist                        Therapy Education  Given: HEP, Symptoms/condition management, Edema management  Program: Reinforced, Progressed  How Provided: Verbal  Provided to: Patient  Level of Understanding: Teach back education performed, Verbalized, Demonstrated     OT Assessment/Plan       Row Name 06/12/24 1523          OT Assessment    Functional Limitations Limitation in home management;Performance in leisure activities;Performance in self-care ADL;Performance in work activities  -EN     Impairments Edema;Muscle strength;Pain;Sensation  -EN     Assessment Comments Patient continues to progress and demonstrated improved  strength today. Patient is not having pain at rest, reports pain has reached 4-5/10 with certain activities such as holding her grandaughter. Patient is progressing with strengthening and is doing well with HEP.  -EN     OT Diagnosis L hand/wrist pain, decreased strength, decreased ADL/IADL independence  -EN     OT Rehab Potential Good  -EN        OT Plan    Planned Therapy Interventions (Optional Details) patient/family education;ROM (Range of Motion);strengthening;home exercise program  -EN     OT Plan Comments Continue with goals.  -EN               User Key  (r) = Recorded By, (t) = Taken By, (c) = Cosigned By      Initials Name Provider Type    Natalia Bettencourt OTR Occupational Therapist                      OT Goals       Row Name 06/12/24 1400          OT Short Term Goals    STG Date to Achieve 06/07/24  -EN     STG 1 Patient will demonstrate independence with HEP/edema management.  -EN     STG 1 Progress Met  -EN     STG 2 Patient will report decreased L wrist/hand pain with use by 50%.  -EN     STG 2 Progress Met  -EN     STG 3 Patient will demonstrate improved left   strength 5# for improved grasp of objects.  -EN     STG 3 Progress Met  -EN        Long Term Goals    LTG Date to Achieve 06/21/24  -EN     LTG 1 Patient will demonstrate 5/5 wrist/forearm strength for improved ADL/IADL independence.  -EN     LTG 1 Progress Met  -EN     LTG 2 Patient will demonstrate improved L  strength by 15# or more for improved grasp of objects.  -EN     LTG 2 Progress Met  -EN     LTG 3 Improved Quick Dash 40 points or more.  -EN     LTG 3 Progress Progressing;Ongoing  -EN     LTG 4 Patient will report decreased pain with use by 75%.  -EN     LTG 4 Progress Progressing;Ongoing  -EN     LTG 4 Progress Comments reports 4-5/10 with use occasionally  -EN               User Key  (r) = Recorded By, (t) = Taken By, (c) = Cosigned By      Initials Name Provider Type    EN Natalia Barrios, OTR Occupational Therapist                     Modalities       Row Name 06/12/24 1400       Moist Heat    MH Applied Yes  -EN    Location left wrist/hand  -EN    OT Moist Heat Minutes 15  -EN    MH S/P Rx Yes  -EN       Iontophoresis 56815    Milliamps 4  radial wrist (first dorsal compartment)  -EN    MA/Min 40  -EN    Dexamethasone used Yes  -EN    Patch Type Medium  -EN              User Key  (r) = Recorded By, (t) = Taken By, (c) = Cosigned By      Initials Name Provider Type    Natalia Bettencourt, OTR Occupational Therapist                   OT Exercises       Row Name 06/12/24 1400             Exercise 1    Exercise Name 1 retrograde edema massage left hand/wrist  -EN         Exercise 2    Exercise Name 2 ROM exercises wrist and thumb  -EN         Exercise 3    Exercise Name 3 wrist strengthening  -EN      Cueing 3 Verbal;Tactile;Demo  -EN      Equipment 3 Dumbell  -EN      Weights/Plates 3 3  one set of 10 with 4#  -EN      Sets 3 2  -EN      Reps 3 15  -EN         Exercise 4    Exercise Name 4 light hand strengthening  -EN      Cueing 4 Verbal;Tactile;Demo  -EN      Equipment 4 Hand Gripper   -EN      Weights/Plates 4 25  -EN      Sets 4 2  -EN      Reps 4 20  hold 3 seconds  -EN                User Key  (r) = Recorded By, (t) = Taken By, (c) = Cosigned By      Initials Name Provider Type    Natalia Bettencourt OTR Occupational Therapist                                  Time Calculation:   OT Start Time: 1405  OT Stop Time: 1500  OT Time Calculation (min): 55 min  Timed Charges  98125 - OT Iontophoresis Minutes: 10  73968 - OT Therapeutic Activity Minutes: 30  Untimed Charges  OT Moist Heat Minutes: 15  Total Minutes  Timed Charges Total Minutes: 40  Untimed Charges Total Minutes: 15   Total Minutes: 40     Therapy Charges for Today       Code Description Service Date Service Provider Modifiers Qty    66982213649 HC OT IONTOPHORESIS EA 15 MIN 6/12/2024 Natalia Barrios OTR GO 1    67651932669 HC OT THERAPEUTIC ACT EA 15 MIN 6/12/2024 Natalia Barrios OTR GO 2    69639872261 HC OT HOT OR COLD PACK TREAT [facility] 6/12/2024 Natalia Barrios OTR GO 1                      ERA Stoner  6/12/2024

## 2024-06-20 ENCOUNTER — HOSPITAL ENCOUNTER (OUTPATIENT)
Dept: PHYSICAL THERAPY | Facility: HOSPITAL | Age: 54
Setting detail: THERAPIES SERIES
Discharge: HOME | End: 2024-06-20
Payer: OTHER MISCELLANEOUS

## 2024-06-20 DIAGNOSIS — M54.2 NECK PAIN: Primary | ICD-10-CM

## 2024-06-20 DIAGNOSIS — M25.562 LEFT KNEE PAIN, UNSPECIFIED CHRONICITY: ICD-10-CM

## 2024-06-20 PROCEDURE — 97012 MECHANICAL TRACTION THERAPY: CPT

## 2024-06-20 PROCEDURE — 97110 THERAPEUTIC EXERCISES: CPT

## 2024-06-20 NOTE — THERAPY RE-EVALUATION
Outpatient Physical Therapy Ortho Re-Evaluation  ALEXANDER Montenegro     Patient Name: Jocelyn Ríos  : 1970  MRN: [id number]  Today's Date: 2024        Visit Date: 2024    Patient Active Problem List   Diagnosis    Anxiety    Moderate persistent asthma without complication    Environmental allergies    Routine adult health maintenance    Complex regional pain syndrome i of left lower limb    Carpal tunnel syndrome on both sides    Status post arthroscopic surgery of left knee, DOS 10/21/2019    Intractable neuropathic pain of left knee    Chronic cervical pain    Chondromalacia of knee, left    Screening mammogram for breast cancer    Moderate mixed hyperlipidemia not requiring statin therapy        Past Medical History:   Diagnosis Date    Acute sinus infection     on poab    Anxiety     Arthritis     Asthma     STARTED @ 41 YRS OLD, DR. BUENO, & [facility] ALLERGY DR. LOPEZ    COVID-19     2021    CRPS (complex regional pain syndrome) type I of lower limb     Elevated cholesterol     Frequent UTI     GERD (gastroesophageal reflux disease)     History of 2019 novel coronavirus disease (COVID-19)     Iliotibial band syndrome affecting left lower leg     Itching     BLE has some scratches to notify MD if doesn't clear up prior to surgery    Kidney stones     Lateral collateral ligament deficiency of left knee 2016    Lateral meniscal tear     Lumbar paraspinal muscle spasm 2017    Meniscal cyst, left 2019    Migraines     Patellofemoral pain syndrome of left knee 2019    PONV (postoperative nausea and vomiting)     Seasonal allergies     Subchondral insufficiency fracture of femoral condyle, left, initial encounter 01/10/2020    Tear of lateral meniscus of left knee, current 2019        Past Surgical History:   Procedure Laterality Date     SECTION      x2    DIAGNOSTIC LAPAROSCOPY      FOOT SURGERY Right     HERNIA REPAIR Bilateral     inguinal  "hernia age 6    INTRAUTERINE DEVICE INSERTION      KNEE ARTHROSCOPY Left 10/21/2016    Procedure: KNEE ARTHROSCOPY debridement of lateral meniscal tear;  Surgeon: Son Mojica MD;  Location:  [facility];  Service:     KNEE ARTHROSCOPY Left 7/27/2018    Procedure: KNEE ARTHROSCOPY;  Surgeon: Son Mojica MD;  Location:  [facility];  Service: Orthopedics    KNEE ARTHROSCOPY Left 12/2/2021    Procedure: LEFT KNEE DIAGNOSTIC  ARTHROSCOPY AND PARTLATERAL MENISECTOMY AND REMOVAL OF MULTIPLE FIXATION DEVICES;  Surgeon: Rodríguez Hamlin MD;  Location:  [facility];  Service: Orthopedics;  Laterality: Left;    KNEE MENISCAL REPAIR Left 10/21/2019    Procedure: knee arthroscopy, debridement of meniscal cyst, and lateral meniscal repair;  Surgeon: Bridger Figueroa MD;  Location:  [facility];  Service: Orthopedics    NERVE BLOCK Left 5/25/2021    Procedure: KNEE GENICULAR NERVE BLOCK UNDER FLUORO- x2, 3-4 wks apart;  Surgeon: James Spivey MD;  Location: [facility] OR;  Service: Pain Management;  Laterality: Left;       Visit Dx:     ICD-10-CM ICD-9-CM   1. Neck pain  M54.2 723.1   2. Left knee pain, unspecified chronicity  M25.562 719.46              PT Ortho       Row Name 06/20/24 1500       Subjective    Subjective Comments Pt reports that she feels about the same; \"I definitely feel like I have more movement in my neck and knee.\" \"My neck is worse than the knee.\" \"I feel like by biggest issue with my knee is if I stand or walk too long, I pay for it. She still reports disturbed sleep due to neck and knee pain. Pt continues to report decreased walking tolerance; \"I took a walk the other night for about 1.5 miles and when I got back my knee was really hurting and I had to ice and elevate it.\"  -LN       Subjective Pain    Able to rate subjective pain? yes  -LN    Pre-Treatment Pain Level 3  knee  -LN    Subjective Pain Comment neck= 4/10  -LN       Cervical Palpation    Occiput Bilateral:;Tender  -LN    Suboccipital " "Bilateral:;Tender;Guarded/taut  L>R  -LN    Cervical Facets Tender  -LN    Scalenes Bilateral:;Tender;Guarded/taut  L>R  -LN    Spinous Process Tender  lefty lower C-spine  -LN    Levator Scapula Bilateral:;Tender;Guarded/taut  L>R  -LN    Upper Traps Bilateral:;Tender;Guarded/taut  L>R  -LN       Knee Palpation    Knee Palpation? --  tender lateral knee area/ITB on L knee  -LN    Medial Joint Line Left:;Tender  very point tender along medial joint line  -LN       General ROM    GENERAL ROM COMMENTS B shoulder AROM WFL-WNL all planes  -LN       Head/Neck/Trunk    Neck Extension AROM WNL- \"pinch\" in lower C-spine  -LN    Neck Flexion AROM WNL- painfree  -LN    Neck Lt Lateral Flexion AROM WFL- mild pain and tightness  -LN    Neck Rt Lateral Flexion AROM 50%- pain and tightness  -LN    Neck Lt Rotation AROM 75%- pain and tightness  -LN    Neck Rt Rotation AROM WFL  -LN       Left Lower Ext    Lt Knee Extension/Flexion AROM L knee 0-135 degrees; pain in full extension  -LN       MMT Left Lower Ext    Lt Hip Flexion MMT, Gross Movement (5/5) normal  -LN    Lt Hip Extension MMT, Gross Movement (5/5) normal  -LN    Lt Hip ABduction MMT, Gross Movement (5/5) normal  -LN    Lt Hip ADduction MMT, Gross Movement (4+/5) good plus  -LN    Lt Knee Extension MMT, Gross Movement (5/5) normal  -LN    Lt Knee Flexion MMT, Gross Movement (5/5) normal  -LN    Lt Ankle Plantarflexion MMT, Gross Movement (5/5) normal  -LN    Lt Ankle Dorsiflexion MMT, Gross Movement (5/5) normal  -LN       Sensation    Sensation WNL? WNL  -LN    Light Touch No apparent deficits  -LN       Upper Extremity Flexibility    Suboccipitals Right:;Left:;Mildly limited  -LN    Scalenes Left:;Moderately limited;Right:;Mildly limited  -LN    Upper Trapezius Left:;Moderately limited;Right:;Mildly limited  -LN    Levator Scapula Bilateral:;Moderately limited  -LN    Pect Minor Bilateral:;Mildly limited  -LN       Lower Extremity Flexibility    Hamstrings Left:;Mildly " limited;Moderately limited  -LN    ITB Left:;Mildly limited;Moderately limited  -LN    Gastrocnemius Left:;Mildly limited  -LN    Soleus Left:;Mildly limited  -LN       LLE Quick Girth (cm)    Largest calf 30.2 cm  -LN    Distal thigh 34.6 cm  -LN       Gait/Stairs (Locomotion)    [place] Level (Gait) independent  -LN    Assistive Device (Gait) other (see comments)  no AD used  -LN    Comment, (Gait/Stairs) Patient ambulates with nominal to minimal antalgic gait L with decreased stride length on L. (Nominal limp noted today on L); No AD used.  -LN              User Key  (r) = Recorded By, (t) = Taken By, (c) = Cosigned By      Initials Name Provider Type    Bren Rodriguez, PT Physical Therapist                                Therapy Education  Education Details: Pt to add bridge with ball to HEP as tolerated. Advised pt to wear her knee sleeve as needed lefty if she walks longer distances.  Given: HEP, Symptoms/condition management, Pain management, Edema management, Posture/body mechanics  Program: Reinforced, New (added bridge with ball)  How Provided: Verbal, Demonstration, Written  Provided to: Patient  Level of Understanding: Teach back education performed, Verbalized, Demonstrated      PT OP Goals       Row Name 06/20/24 1500          PT Short Term Goals    STG Date to Achieve 07/04/24  -LN     STG 1 Pt to verbally report decreased neck pain to <6/10 with ADLs and everyday activities.  -LN     STG 1 Progress Met  -LN     STG 1 Progress Comments Pt rated neck pain at 4/10 today.  -LN     STG 2 Pt to verbally report decreased L knee pain to <6/10 with ADLs and everyday activities.  -LN     STG 2 Progress Met  -LN     STG 2 Progress Comments Pt rated pain at 3/10 L knee.  -LN     STG 3 Pt independent with initial HEP issued by therapist.  -LN     STG 3 Progress Met  -LN     STG 4 CROM improved by 25% to allow her to turn her neck better with driving.  -LN     STG 4 Progress Partially  Met;Ongoing;Progressing  -LN     STG 4 Progress Comments met for all planes except L rotation same.  -LN     STG 5 Muscle guarding decreased to minimal B UT/levator/MT/rhomboids/scalenes.  -LN     STG 5 Progress Partially Met;Ongoing;Progressing  -LN     STG 5 Progress Comments minimal to moderate mm guarding still persists, L>R.  -LN     STG 6 Posterior cervical HA decreased by 25%-50%.  -LN     STG 6 Progress Met  -LN     STG 6 Progress Comments No longer getting posterior HA.  -LN     STG 7 Episodes of B Hand N/T decreased by 25%-50%.  -LN     STG 7 Progress Met  -LN     STG 7 Progress Comments No longer getting N/T in hands  -LN        Long Term Goals    LTG Date to Achieve 07/18/24  -LN     LTG 1 Pt to verbally report decreased neck pain to <3/10 with ADLs and everyday activities.  -LN     LTG 1 Progress Ongoing;Progressing  -LN     LTG 1 Progress Comments Pt rated neck pain at 4/10 today.  -LN     LTG 2 Pt to verbally report decreased L knee pain to <3/10 with ADLs and everyday activities.  -LN     LTG 2 Progress Ongoing;Progressing  -LN     LTG 2 Progress Comments Pt rated pain at 3/10 L knee.  -LN     LTG 3 CROM WFL & painfree all planes.  -LN     LTG 3 Progress Partially Met;Ongoing;Progressing  -LN     LTG 4 L hip and knee strength improved to 5/5.  -LN     LTG 4 Progress Partially Met;Ongoing;Progressing  -LN     LTG 4 Progress Comments met for all planes except hip abduction= 4+/5.  -LN     LTG 5 No limp noted with ambulation.  -LN     LTG 5 Progress Partially Met;Ongoing;Progressing  -LN     LTG 5 Progress Comments Only nominal limp noted on L today.  -LN     LTG 6 Muscle guarding decreased to nominal B UT/levator/MT/rhomboids/scalenes.  -LN     LTG 6 Progress Ongoing;Progressing  -LN     LTG 6 Progress Comments minimal to moderate mm guarding still persists, L>R.  -LN     LTG 7 Pt to no longer report any N/T in hands.  -LN     LTG 7 Progress Met  -LN     LTG 7 Progress Comments No longer getting N/T in  hands  -LN     LTG 8 Posterior cervical HA decreased by 50%-75%.  -LN     LTG 8 Progress Met  -LN     LTG 8 Progress Comments No longer getting posterior HA.  -LN     LTG 9 Improved mm girth by 2-3 cm in L Quads and by 0.5cm in L calf.  -LN     LTG 9 Progress Met  -LN     LTG 9 Progress Comments L Quads mm girth improved by 2.6 cm. and calf improved by 0.7 cm.  -LN        Time Calculation    PT Goal Re-Cert Due Date 07/18/24  -LN               User Key  (r) = Recorded By, (t) = Taken By, (c) = Cosigned By      Initials Name Provider Type    Bren Rodriguez, PT Physical Therapist                     PT Assessment/Plan       Row Name 06/20/24 1500          PT Assessment    Assessment Comments Pt has progressed well with PT with overall decreased c/o L knee pain and neck pain but minimal pain persists in neck and L knee (medial joint line and lateral knee/ITB). Pt was very point tender at medial joint line. Pt continues with min to moderate mm guarding, L>R cervical PS/scalenes/levator/UT. Pt no longer c/o any posterior HA and no longer c/o N/T in hands. Pt with improved CROM in all planes except no change in L rotation since initial evaluation; R lateral flexion is the most limited still. Pt with WNL L knee ROM, but still has increased pain in full extension. Pt with improved L hip and knee strength and is 5/5 in all planes except hip adduction is 4+/5. Pt continues to report good relief and benefit from ICTX. She is independent with present HEP and has progressed well with exercises. Pt with improved L Quads mm girth by 2.6 cm. and calf mm girth improved by 0.7 cm. Only nominal antalgic gait noted on L. Pt still with decreased walking tolerance due to knee pain. Pt still reports disturbed sleep due to neck and knee pain. Pt with sx of possible MMT L knee and if her pain persists or worsens, feel she would benefit from a MRI to check for internal derangement. She has met  5 out of 7 STG and partially met 2  "STG and she has met 3 out of 9 LTG and partially met 3 LTG. She is making good progress towards all remaining goals. Feel pt would benefit from continued PT for progressive exercises; continued pain relief and improved CROM; & decreased mm guarding in neck and shoulders/UT to allow pt to return to all her normal activities and be painfree.  -LN        PT Plan    PT Frequency 1x/week  -LN     Predicted Duration of Therapy Intervention (PT) 4 more weeks  -LN     PT Plan Comments Cont per POC, add corner stretch as tolerated. Pt sees MD on 7/15/24. Continue with ICTX; increase weight as tolerated. Continue towards all remaining goals.  -LN               User Key  (r) = Recorded By, (t) = Taken By, (c) = Cosigned By      Initials Name Provider Type    Bren Rodriguez, PT Physical Therapist                     Modalities       Row Name 06/20/24 1500             Moist Heat    MH Applied Yes  -LN      Location Cervical spine & L knee (anterior and posterior) with pt supine in hooklying.  -LN      PT Moist Heat Minutes 12  -LN      MH S/P Rx Yes  after exercises and before ICTX  -LN         Traction 51121    Traction Type Cervical  with MH across shoulders  -LN      PT Traction Rx Minutes 15  -LN      Duration Intermittent  -LN      Position Hook-lying  -LN      Weight 15  -LN      Hold 60  -LN      Relax 20  -LN                User Key  (r) = Recorded By, (t) = Taken By, (c) = Cosigned By      Initials Name Provider Type    Bren Rodriguez, PT Physical Therapist                   OP Exercises       Row Name 06/20/24 1500             Precautions    Existing Precautions/Restrictions no known precautions/restrictions  -LN         Subjective    Subjective Comments Pt reports that she feels about the same; \"I definitely feel like I have more movement in my neck and knee.\" \"My neck is worse than the knee.\" \"I feel like by biggest issue with my knee is if I stand or walk too long, I pay for it. She still " "reports disturbed sleep due to neck and knee pain. Pt continues to report decreased walking tolerance; \"I took a walk the other night for about 1.5 miles and when I got back my knee was really hurting and I had to ice and elevate it.\"  -LN         Subjective Pain    Able to rate subjective pain? yes  -LN      Pre-Treatment Pain Level 3  knee  -LN      Subjective Pain Comment neck= 4/10  -LN         Total Minutes    12083 - PT Therapeutic Exercise Minutes 28  -LN         Exercise 1    Exercise Name 1 QS over single towel roll  -LN      Cueing 1 Verbal;Tactile;Demo  -LN      Reps 1 30  -LN      Time 1 5 sec  -LN         Exercise 2    Exercise Name 2 SAQ ball squeeze  -LN      Cueing 2 Verbal;Tactile;Demo  -LN      Reps 2 30  -LN      Time 2 3-5 sec  -LN         Exercise 3    Exercise Name 3 SLR  -LN      Cueing 3 Verbal;Tactile;Demo  -LN      Sets 3 2  -LN      Reps 3 15  -LN      Time 3 1.5#  -LN         Exercise 4    Exercise Name 4 sidelying hip abduction  -LN      Cueing 4 Verbal;Tactile;Demo  -LN      Sets 4 2  -LN      Reps 4 15  -LN      Time 4 1.5#  -LN         Exercise 5    Exercise Name 5 sidelying hip adduction  -LN      Cueing 5 Verbal;Tactile;Demo  -LN      Sets 5 2  -LN      Reps 5 15  -LN      Time 5 1.5#  -LN         Exercise 6    Exercise Name 6 Active cervical SB/UT stretch  -LN         Exercise 7    Exercise Name 7 Levator scapula stretch  -LN         Exercise 8    Exercise Name 8 Scapular retraction  -LN         Exercise 9    Exercise Name 9 hooklying ball squeeze  -LN      Cueing 9 Verbal;Tactile;Demo  -LN      Reps 9 30  -LN      Time 9 5 sec  -LN         Exercise 10    Exercise Name 10 heel raises  -LN      Cueing 10 Verbal  -LN      Reps 10 30  -LN         Exercise 11    Exercise Name 11 TKE vs TB  -LN      Cueing 11 Verbal;Demo  -LN      Reps 11 30  -LN      Time 11 black TB  -LN         Exercise 12    Exercise Name 12 Prone leg raises  -LN      Cueing 12 Verbal;Tactile;Demo  -LN      Reps 12 " 2 x 15  -LN      Time 12 1.5#  -LN         Exercise 13    Exercise Name 13 Bridge with ball  -LN      Cueing 13 Verbal;Tactile;Demo  -LN      Reps 13 15  -LN      Time 13 5 sec  -LN                User Key  (r) = Recorded By, (t) = Taken By, (c) = Cosigned By      Initials Name Provider Type    LN Bren Shepherd, PT Physical Therapist                                            Time Calculation:     Start Time: 1500  Stop Time: 1610  Time Calculation (min): 70 min  Timed Charges  22840 - PT Therapeutic Exercise Minutes: 28  Untimed Charges  PT Traction Rx Minutes: 15  PT Moist Heat Minutes: 12  Total Minutes  Timed Charges Total Minutes: 28  Untimed Charges Total Minutes: 27   Total Minutes: 55     Therapy Charges for Today       Code Description Service Date Service Provider Modifiers Qty    70768791636 HC PT THER PROC EA 15 MIN 6/20/2024 Bren Shepherd, PT GP 2    [id number] HC PT TRACTION CERVICAL 6/20/2024 Bren Shepherd, PT GP 1                      Bren Shepherd, PT  6/20/2024

## 2024-06-26 ENCOUNTER — HOSPITAL ENCOUNTER (OUTPATIENT)
Dept: OCCUPATIONAL THERAPY | Facility: HOSPITAL | Age: 54
Setting detail: THERAPIES SERIES
Discharge: HOME | End: 2024-06-26
Payer: OTHER MISCELLANEOUS

## 2024-06-26 ENCOUNTER — HOSPITAL ENCOUNTER (OUTPATIENT)
Dept: PHYSICAL THERAPY | Facility: HOSPITAL | Age: 54
Setting detail: THERAPIES SERIES
Discharge: HOME | End: 2024-06-26
Payer: OTHER MISCELLANEOUS

## 2024-06-26 DIAGNOSIS — M25.532 LEFT WRIST PAIN: ICD-10-CM

## 2024-06-26 DIAGNOSIS — M54.2 NECK PAIN: Primary | ICD-10-CM

## 2024-06-26 DIAGNOSIS — M79.642 LEFT HAND PAIN: Primary | ICD-10-CM

## 2024-06-26 DIAGNOSIS — M25.562 LEFT KNEE PAIN, UNSPECIFIED CHRONICITY: ICD-10-CM

## 2024-06-26 PROCEDURE — 97530 THERAPEUTIC ACTIVITIES: CPT

## 2024-06-26 PROCEDURE — 97110 THERAPEUTIC EXERCISES: CPT

## 2024-06-26 PROCEDURE — 97012 MECHANICAL TRACTION THERAPY: CPT

## 2024-06-26 NOTE — THERAPY TREATMENT NOTE
Outpatient Physical Therapy Ortho Treatment Note  ALEXANDER [facility]en[facility]     Patient Name: Jocelyn Ríos  : 1970  MRN: [id number]  Today's Date: 2024      Visit Date: 2024    Visit Dx:    ICD-10-CM ICD-9-CM   1. Neck pain  M54.2 723.1   2. Left knee pain, unspecified chronicity  M25.562 719.46       Patient Active Problem List   Diagnosis    Anxiety    Moderate persistent asthma without complication    Environmental allergies    Routine adult health maintenance    Complex regional pain syndrome i of left lower limb    Carpal tunnel syndrome on both sides    Status post arthroscopic surgery of left knee, DOS 10/21/2019    Intractable neuropathic pain of left knee    Chronic cervical pain    Chondromalacia of knee, left    Screening mammogram for breast cancer    Moderate mixed hyperlipidemia not requiring statin therapy        Past Medical History:   Diagnosis Date    Acute sinus infection     on poab    Anxiety     Arthritis     Asthma     STARTED @ 41 YRS OLD, DR. BUENO, & [facility] ALLERGY DR. LOPEZ    COVID-19     2021    CRPS (complex regional pain syndrome) type I of lower limb     Elevated cholesterol     Frequent UTI     GERD (gastroesophageal reflux disease)     History of 2019 novel coronavirus disease (COVID-19)     Iliotibial band syndrome affecting left lower leg     Itching     BLE has some scratches to notify MD if doesn't clear up prior to surgery    Kidney stones     Lateral collateral ligament deficiency of left knee 2016    Lateral meniscal tear     Lumbar paraspinal muscle spasm 2017    Meniscal cyst, left 2019    Migraines     Patellofemoral pain syndrome of left knee 2019    PONV (postoperative nausea and vomiting)     Seasonal allergies     Subchondral insufficiency fracture of femoral condyle, left, initial encounter 01/10/2020    Tear of lateral meniscus of left knee, current 2019        Past Surgical History:   Procedure  Laterality Date     SECTION      x2    DIAGNOSTIC LAPAROSCOPY      FOOT SURGERY Right     HERNIA REPAIR Bilateral     inguinal hernia age 6    INTRAUTERINE DEVICE INSERTION      KNEE ARTHROSCOPY Left 10/21/2016    Procedure: KNEE ARTHROSCOPY debridement of lateral meniscal tear;  Surgeon: Son Mojica MD;  Location:  [facility];  Service:     KNEE ARTHROSCOPY Left 2018    Procedure: KNEE ARTHROSCOPY;  Surgeon: Son Mojica MD;  Location:  [facility];  Service: Orthopedics    KNEE ARTHROSCOPY Left 2021    Procedure: LEFT KNEE DIAGNOSTIC  ARTHROSCOPY AND PARTLATERAL MENISECTOMY AND REMOVAL OF MULTIPLE FIXATION DEVICES;  Surgeon: Rodríguez Hamlin MD;  Location:  [facility];  Service: Orthopedics;  Laterality: Left;    KNEE MENISCAL REPAIR Left 10/21/2019    Procedure: knee arthroscopy, debridement of meniscal cyst, and lateral meniscal repair;  Surgeon: Bridger Figueroa MD;  Location:  [facility];  Service: Orthopedics    NERVE BLOCK Left 2021    Procedure: KNEE GENICULAR NERVE BLOCK UNDER FLUORO- x2, 3-4 wks apart;  Surgeon: James Spivey MD;  Location: [facility];  Service: Pain Management;  Laterality: Left;        PT Ortho       Row Name 24 1300       Subjective    Subjective Comments pt reports no significant changes since last visit; does feel her knee sleeve is helpful  when walking long distances  -              User Key  (r) = Recorded By, (t) = Taken By, (c) = Cosigned By      Initials Name Provider Type     Alec Ribera, PTA Physical Therapist Assistant                                 PT Assessment/Plan       Row Name 24 0748          PT Assessment    Assessment Comments pt continues to report knee pain, especially with walking long distances but does get relief if uses knee sleeve; tolerated progression of reps well and without complaints  -        PT Plan    PT Plan Comments Cont per POC, add corner stretch  -               User Key  (r) = Recorded By,  (t) = Taken By, (c) = Cosigned By      Initials Name Provider Type    Alec Leonard, EDD Physical Therapist Assistant                     Modalities       Row Name 06/26/24 1300             Moist Heat    Location Cervical spine & L knee (anterior and posterior) with pt supine in hooklying.  -      PT Moist Heat Minutes 10  -      MH S/P Rx Yes  after exercises and before ICTX  -         Traction 63927    Traction Type Cervical  with MH across shoulders  -      PT Traction Rx Minutes 15  -MH      Position Hook-lying  -      Weight 15  -MH      Hold 60  -MH      Relax 20  -MH                User Key  (r) = Recorded By, (t) = Taken By, (c) = Cosigned By      Initials Name Provider Type    Alec Leonard, EDD Physical Therapist Assistant                   OP Exercises       Row Name 06/26/24 1557 06/26/24 1300          Precautions    Existing Precautions/Restrictions -- no known precautions/restrictions  -        Subjective    Subjective Comments -- pt reports no significant changes since last visit; does feel her knee sleeve is helpful  when walking long distances  -        Total Minutes    22263 - PT Therapeutic Exercise Minutes 25  -MH --        Exercise 1    Exercise Name 1 -- QS over single towel roll  -     Cueing 1 -- Verbal;Tactile;Demo  -     Reps 1 -- 35  -MH     Time 1 -- 5 sec  -        Exercise 2    Exercise Name 2 -- SAQ ball squeeze  -     Cueing 2 -- Verbal;Tactile;Demo  -     Reps 2 -- 30  -MH     Time 2 -- 3-5 sec  -     Additional Comments -- 1.5#  -        Exercise 3    Exercise Name 3 -- SLR  -     Cueing 3 -- Verbal;Tactile;Demo  -     Sets 3 -- --  -MH     Reps 3 -- 35  -MH     Time 3 -- 1.5#  -        Exercise 4    Exercise Name 4 -- sidelying hip abduction  -     Cueing 4 -- Verbal;Tactile;Demo  -     Sets 4 -- --  -     Reps 4 -- 35  -MH     Time 4 -- 1.5#  -        Exercise 5    Exercise Name 5 -- sidelying hip adduction  -     Cueing 5 --  Verbal;Tactile;Demo  -     Sets 5 -- --  -MH     Reps 5 -- 35  -MH     Time 5 -- 1.5#  -        Exercise 6    Exercise Name 6 -- Active cervical SB/UT stretch  -        Exercise 7    Exercise Name 7 -- Levator scapula stretch  -        Exercise 8    Exercise Name 8 -- Scapular retraction  -        Exercise 9    Exercise Name 9 -- hooklying ball squeeze  -     Cueing 9 -- Verbal;Tactile;Demo  -     Reps 9 -- 35  -MH     Time 9 -- 5 sec  -        Exercise 10    Exercise Name 10 -- heel raises  -     Cueing 10 -- Verbal  -     Reps 10 -- 35  -        Exercise 11    Exercise Name 11 -- TKE vs TB  -     Cueing 11 -- Verbal;Demo  -     Reps 11 -- 35  -MH     Time 11 -- black TB  -        Exercise 12    Exercise Name 12 -- Prone leg raises  -     Cueing 12 -- Verbal;Tactile;Demo  -     Reps 12 -- 35  -MH     Time 12 -- 1.5#  -        Exercise 13    Exercise Name 13 -- Bridge with ball  -     Cueing 13 -- Verbal;Tactile;Demo  -     Reps 13 -- 35  -MH     Time 13 -- 5 sec  -               User Key  (r) = Recorded By, (t) = Taken By, (c) = Cosigned By      Initials Name Provider Type     Alec Ribera PTA Physical Therapist Assistant                                     Therapy Education  Given: HEP, Symptoms/condition management  Program: Reinforced  How Provided: Verbal, Demonstration  Provided to: Patient  Level of Understanding: Teach back education performed, Verbalized, Demonstrated              Time Calculation:   Start Time: 1306  Stop Time: 1417  Time Calculation (min): 71 min  Timed Charges  35182 - PT Therapeutic Exercise Minutes: 25  Untimed Charges  PT Traction Rx Minutes: 15  PT Moist Heat Minutes: 10  Total Minutes  Timed Charges Total Minutes: 25  Untimed Charges Total Minutes: 25   Total Minutes: 25  Therapy Charges for Today       Code Description Service Date Service Provider Modifiers Qty    23440984729 HC PT THER PROC EA 15 MIN 6/26/2024 Alec Ribera PTA GP  2    [id number]  PT TRACTION CERVICAL 6/26/2024 Alec Ribera, PTA GP 1                      Alec Ribera, EDD  6/26/2024

## 2024-06-26 NOTE — THERAPY PROGRESS REPORT/RE-CERT
Outpatient Occupational Therapy Ortho Progress Note  [facility]     Patient Name: Jocelyn Ríos  : 1970  MRN: [id number]  Today's Date: 2024        Visit Date: 2024    Patient Active Problem List   Diagnosis    Anxiety    Moderate persistent asthma without complication    Environmental allergies    Routine adult health maintenance    Complex regional pain syndrome i of left lower limb    Carpal tunnel syndrome on both sides    Status post arthroscopic surgery of left knee, DOS 10/21/2019    Intractable neuropathic pain of left knee    Chronic cervical pain    Chondromalacia of knee, left    Screening mammogram for breast cancer    Moderate mixed hyperlipidemia not requiring statin therapy        Past Medical History:   Diagnosis Date    Acute sinus infection     on poab    Anxiety     Arthritis     Asthma     STARTED @ 41 YRS OLD, DR. BUENO, & [facility] ALLERGY DR. LOPEZ    COVID-19     2021    CRPS (complex regional pain syndrome) type I of lower limb     Elevated cholesterol     Frequent UTI     GERD (gastroesophageal reflux disease)     History of 2019 novel coronavirus disease (COVID-19)     Iliotibial band syndrome affecting left lower leg     Itching     BLE has some scratches to notify MD if doesn't clear up prior to surgery    Kidney stones     Lateral collateral ligament deficiency of left knee 2016    Lateral meniscal tear     Lumbar paraspinal muscle spasm 2017    Meniscal cyst, left 2019    Migraines     Patellofemoral pain syndrome of left knee 2019    PONV (postoperative nausea and vomiting)     Seasonal allergies     Subchondral insufficiency fracture of femoral condyle, left, initial encounter 01/10/2020    Tear of lateral meniscus of left knee, current 2019        Past Surgical History:   Procedure Laterality Date     SECTION      x2    DIAGNOSTIC LAPAROSCOPY      FOOT SURGERY Right     HERNIA REPAIR Bilateral      inguinal hernia age 6    INTRAUTERINE DEVICE INSERTION      KNEE ARTHROSCOPY Left 10/21/2016    Procedure: KNEE ARTHROSCOPY debridement of lateral meniscal tear;  Surgeon: Son Mojica MD;  Location:  [facility];  Service:     KNEE ARTHROSCOPY Left 7/27/2018    Procedure: KNEE ARTHROSCOPY;  Surgeon: Son Mojica MD;  Location:  [facility];  Service: Orthopedics    KNEE ARTHROSCOPY Left 12/2/2021    Procedure: LEFT KNEE DIAGNOSTIC  ARTHROSCOPY AND PARTLATERAL MENISECTOMY AND REMOVAL OF MULTIPLE FIXATION DEVICES;  Surgeon: Rodríguez Hamlin MD;  Location:  [facility];  Service: Orthopedics;  Laterality: Left;    KNEE MENISCAL REPAIR Left 10/21/2019    Procedure: knee arthroscopy, debridement of meniscal cyst, and lateral meniscal repair;  Surgeon: Bridger Figueroa MD;  Location:  [facility];  Service: Orthopedics    NERVE BLOCK Left 5/25/2021    Procedure: KNEE GENICULAR NERVE BLOCK UNDER FLUORO- x2, 3-4 wks apart;  Surgeon: James Spivey MD;  Location: [facility] [place] [place] MA[facility] OR;  Service: Pain Management;  Laterality: Left;         Visit Dx:    ICD-10-CM ICD-9-CM   1. Left hand pain  M79.642 729.5   2. Left wrist pain  M25.532 719.43        OT Ortho       Row Name 06/26/24 1400             MMT Left Upper Ext    Lt Elbow Flexion MMT, Gross Movement (5/5) normal  -EN      Lt Elbow Extension MMT, Gross Movement (5/5) normal  -EN      Lt Forearm Supination MMT, Gross Movement (5/5) normal  -EN      Lt Forearm Pronation MMT, Gross Movement (5/5) normal  -EN      Lt Wrist Flexion MMT, Gross Movement (5/5) normal  with pain  -EN      Lt Wrist Extension MMT, Gross Movement (5/5) normal  -EN      Lt Upper Extremity Comments  RD 5/5, UD 5/5  -EN         LUE Edema - Circumference (cm)    Wrist Crease 14.3 cm  -EN                User Key  (r) = Recorded By, (t) = Taken By, (c) = Cosigned By      Initials Name Provider Type    Natalia Bettencourt OTR Occupational Therapist                    Hand Therapy (Last 24 Hours)       Hand  "Jeannetteal       Row Name 06/26/24 1400             Subjective Pain    Subjective Pain Comment Patient reports pain in wrist/hand \"comes and goes\". Reports pain more with lifting with forearm supinated. Reports less pain if lifting with forearm pronated. Reports no pain in first dorsal compartment area however pain in ulnar wrist lefty during lifting.  -EN          Strength Left    # Reps 1  -EN      Left Rung 2  -EN      Left  Test 1 49  -EN       Strength Average Left 49  -EN         Left Hand Strength - Pinch (lbs)    Lateral 12 lbs  -EN         Therapy Education    Education Details Increase to 4 pounds, continue with HEP  -EN      Given HEP;Symptoms/condition management;Edema management  -EN      Program Reinforced;Progressed  -EN      How Provided Verbal;Demonstration  -EN      Provided to Patient  -EN      Level of Understanding Teach back education performed;Verbalized;Demonstrated  -EN                User Key  (r) = Recorded By, (t) = Taken By, (c) = Cosigned By      Initials Name Provider Type    Natalia Bettencourt, OTR Occupational Therapist                        Therapy Education  Education Details: Increase to 4 pounds, continue with HEP  Given: HEP, Symptoms/condition management, Edema management  Program: Reinforced, Progressed  How Provided: Verbal, Demonstration  Provided to: Patient  Level of Understanding: Teach back education performed, Verbalized, Demonstrated     OT Assessment/Plan       Row Name 06/26/24 1539 06/26/24 1537       OT Assessment    Functional Limitations -- Limitation in home management;Performance in leisure activities;Performance in self-care ADL;Performance in work activities  -EN    Impairments -- Edema;Muscle strength;Pain;Sensation  -EN    Assessment Comments Patient reports pain comes and goes and states the first dorsal compartment pain is gone however pain in the ulnar wrist during lifting activites reaches 4-5/10 occasionally. Patient reports she can now sweep " the floor with a broom, wash her back, cut food etc.. independently without pain however she continues to have pain when lifting a gallon of milk or detergent in the ulnar wrist. Patient's  strength is WFL and wrist and forearm strength has also improved. Patient is doing well with HEP. Reinforced edema management and progressed strengthening program.  -EN --    OT Diagnosis L wrist/hand pain, decreased strength, decreased ADL/IADL independence  -EN --    OT Rehab Potential Good  -EN --       OT Plan    OT Frequency 2x/week  -EN --    Predicted Duration of Therapy Intervention (OT) 4 weeks  -EN --    Planned Therapy Interventions (Optional Details) patient/family education;ROM (Range of Motion);strengthening;home exercise program  -EN --    OT Plan Comments Continue with goals.  -EN --              User Key  (r) = Recorded By, (t) = Taken By, (c) = Cosigned By      Initials Name Provider Type    Natalia Bettencourt OTR Occupational Therapist                      OT Goals       Row Name 06/26/24 1500          OT Short Term Goals    STG Date to Achieve 06/07/24  -EN     STG 1 Patient will demonstrate independence with HEP/edema management.  -EN     STG 1 Progress Met  -EN     STG 2 Patient will report decreased L wrist/hand pain with use by 50%.  -EN     STG 2 Progress Met  -EN     STG 3 Patient will demonstrate improved left  strength 5# for improved grasp of objects.  -EN     STG 3 Progress Met  -EN        Long Term Goals    LTG Date to Achieve 07/12/24  -EN     LTG 1 Patient will demonstrate 5/5 wrist/forearm strength for improved ADL/IADL independence.  -EN     LTG 1 Progress Met  -EN     LTG 2 Patient will demonstrate improved L  strength by 15# or more for improved grasp of objects.  -EN     LTG 2 Progress Met  -EN     LTG 3 Improved Quick Dash 40 points or more.  -EN     LTG 3 Progress Progressing;Ongoing  -EN     LTG 3 Progress Comments has improved 39 points  -EN     LTG 4 Patient will report  decreased pain with use by 75%.  -EN     LTG 4 Progress Progressing;Ongoing  -EN     LTG 4 Progress Comments reports 4-5/10 occasionally with lifting  -EN               User Key  (r) = Recorded By, (t) = Taken By, (c) = Cosigned By      Initials Name Provider Type    Natalia Bettencourt OTR Occupational Therapist                     Modalities       Row Name 06/26/24 1400             Subjective Pain    Able to rate subjective pain? yes  -EN      Pre-Treatment Pain Level 3  -EN         Moist Heat    MH Applied Yes  -EN      Location left hand/wrist  -EN      PT Moist Heat Minutes 12  -EN      MH Prior to Rx Yes  -EN                User Key  (r) = Recorded By, (t) = Taken By, (c) = Cosigned By      Initials Name Provider Type    Natalia Bettencourt OTR Occupational Therapist                   OT Exercises       Row Name 06/26/24 1400             Exercise 1    Exercise Name 1 retrograde edema massage left hand/wrist  -EN         Exercise 2    Exercise Name 2 ROM exercises wrist and thumb  -EN         Exercise 3    Exercise Name 3 wrist strengthening  -EN      Cueing 3 Verbal;Tactile;Demo  -EN      Equipment 3 Dumbell  -EN      Weights/Plates 3 4  -EN      Sets 3 3  -EN      Reps 3 15  -EN         Exercise 4    Exercise Name 4 wrist/forearm strengthening with therabar (supination, pronation, flexion and extension  -EN      Cueing 4 Verbal;Tactile;Demo  -EN      Equipment 4 Other  -EN      Resistance 4 Red  -EN      Sets 4 1  -EN      Reps 4 15  -EN         Exercise 5    Exercise Name 5 hand strengthening with blue digiflex (hold for 3 seconds X 15 reps) and hand gripper 25 pounds (hold 3 seconds X 15 reps)  -EN                User Key  (r) = Recorded By, (t) = Taken By, (c) = Cosigned By      Initials Name Provider Type    Natalia Bettencourt OTR Occupational Therapist                      Quick DASH  Open a tight or new jar.: Mild Difficulty  Do heavy household chores (e.g., wash walls, wash floors):  "Mild Difficulty  Carry a shopping bag or briefcase: Mild Difficulty (\"only carrying light packages like bread and lettuce\")  Wash your back: No Difficulty  Use a knife to cut food: Mild Difficulty  Recreational activities in which you take some force or impact through your arm, should or hand (e.g. golf, hammering, tennis, etc.): Moderate Difficulty  During the past week, to what extent has your arm, shoulder, or hand problem interfered with your normal social activites with family, friends, neighbors or groups?: Slightly  During the past week, were you limited in your work or other regular daily activities as a result of your arm, shoulder or hand problem?: Slightly Limited  Arm, Shoulder, or hand pain: Mild  Tingling (pins and needles) in your arm, shoulder, or hand: None  During the past week, how much difficulty have you had sleeping because of the pain in your arm, shoulder or hand?: Mild Difficulty  Number of Questions Answered: 11  Quick DASH Score: 22.73           Time Calculation:   OT Start Time: 1415  OT Stop Time: 1500  OT Time Calculation (min): 45 min  Timed Charges  19120 - OT Therapeutic Activity Minutes: 30  Total Minutes  Timed Charges Total Minutes: 30   Total Minutes: 30     Therapy Charges for Today       Code Description Service Date Service Provider Modifiers Qty    85530014474  OT THERAPEUTIC ACT EA 15 MIN 6/26/2024 Natalia Barrios OTR GO 2    [id number]  OT HOT OR COLD PACK TREAT [facility] 6/26/2024 Natalia Barrios OTR GO 1                      ERA Stoner  6/26/2024  "

## 2024-07-02 ENCOUNTER — HOSPITAL ENCOUNTER (OUTPATIENT)
Dept: PHYSICAL THERAPY | Facility: HOSPITAL | Age: 54
Setting detail: THERAPIES SERIES
Discharge: HOME | End: 2024-07-02
Payer: OTHER MISCELLANEOUS

## 2024-07-02 ENCOUNTER — HOSPITAL ENCOUNTER (OUTPATIENT)
Dept: OCCUPATIONAL THERAPY | Facility: HOSPITAL | Age: 54
Setting detail: THERAPIES SERIES
Discharge: HOME | End: 2024-07-02
Payer: OTHER MISCELLANEOUS

## 2024-07-02 DIAGNOSIS — M54.2 NECK PAIN: Primary | ICD-10-CM

## 2024-07-02 DIAGNOSIS — M25.532 LEFT WRIST PAIN: ICD-10-CM

## 2024-07-02 DIAGNOSIS — M79.642 LEFT HAND PAIN: Primary | ICD-10-CM

## 2024-07-02 DIAGNOSIS — M25.562 LEFT KNEE PAIN, UNSPECIFIED CHRONICITY: ICD-10-CM

## 2024-07-02 PROCEDURE — 97168 OT RE-EVAL EST PLAN CARE: CPT

## 2024-07-02 PROCEDURE — 97012 MECHANICAL TRACTION THERAPY: CPT

## 2024-07-02 PROCEDURE — 97110 THERAPEUTIC EXERCISES: CPT

## 2024-07-02 NOTE — THERAPY RE-EVALUATION
Outpatient Occupational Therapy Ortho Re-Evaluation  [facility]     Patient Name: Jocelyn Ríos  : 1970  MRN: [id number]  Today's Date: 2024      Visit Date: 2024    Patient Active Problem List   Diagnosis    Anxiety    Moderate persistent asthma without complication    Environmental allergies    Routine adult health maintenance    Complex regional pain syndrome i of left lower limb    Carpal tunnel syndrome on both sides    Status post arthroscopic surgery of left knee, DOS 10/21/2019    Intractable neuropathic pain of left knee    Chronic cervical pain    Chondromalacia of knee, left    Screening mammogram for breast cancer    Moderate mixed hyperlipidemia not requiring statin therapy        Past Medical History:   Diagnosis Date    Acute sinus infection     on poab    Anxiety     Arthritis     Asthma     STARTED @ 41 YRS OLD, DR. BUENO, & [facility] ALLERGY DR. LOPEZ    COVID-19     2021    CRPS (complex regional pain syndrome) type I of lower limb     Elevated cholesterol     Frequent UTI     GERD (gastroesophageal reflux disease)     History of 2019 novel coronavirus disease (COVID-19)     Iliotibial band syndrome affecting left lower leg     Itching     BLE has some scratches to notify MD if doesn't clear up prior to surgery    Kidney stones     Lateral collateral ligament deficiency of left knee 2016    Lateral meniscal tear     Lumbar paraspinal muscle spasm 2017    Meniscal cyst, left 2019    Migraines     Patellofemoral pain syndrome of left knee 2019    PONV (postoperative nausea and vomiting)     Seasonal allergies     Subchondral insufficiency fracture of femoral condyle, left, initial encounter 01/10/2020    Tear of lateral meniscus of left knee, current 2019        Past Surgical History:   Procedure Laterality Date     SECTION      x2    DIAGNOSTIC LAPAROSCOPY      FOOT SURGERY Right     HERNIA REPAIR Bilateral     inguinal  "hernia age 6    INTRAUTERINE DEVICE INSERTION      KNEE ARTHROSCOPY Left 10/21/2016    Procedure: KNEE ARTHROSCOPY debridement of lateral meniscal tear;  Surgeon: Son Mojica MD;  Location:  [facility];  Service:     KNEE ARTHROSCOPY Left 7/27/2018    Procedure: KNEE ARTHROSCOPY;  Surgeon: Son Mojica MD;  Location:  [facility];  Service: Orthopedics    KNEE ARTHROSCOPY Left 12/2/2021    Procedure: LEFT KNEE DIAGNOSTIC  ARTHROSCOPY AND PARTLATERAL MENISECTOMY AND REMOVAL OF MULTIPLE FIXATION DEVICES;  Surgeon: Rodríguez Hamlin MD;  Location:  [facility];  Service: Orthopedics;  Laterality: Left;    KNEE MENISCAL REPAIR Left 10/21/2019    Procedure: knee arthroscopy, debridement of meniscal cyst, and lateral meniscal repair;  Surgeon: Bridger Figueroa MD;  Location:  [facility];  Service: Orthopedics    NERVE BLOCK Left 5/25/2021    Procedure: KNEE GENICULAR NERVE BLOCK UNDER FLUORO- x2, 3-4 wks apart;  Surgeon: James Spivey MD;  Location: [facility] OR;  Service: Pain Management;  Laterality: Left;         Visit Dx:    ICD-10-CM ICD-9-CM   1. Left hand pain  M79.642 729.5   2. Left wrist pain  M25.532 719.43            OT Ortho       Row Name 07/02/24 1400             Subjective Comments    Subjective Comments Patient reports she has has seen improvements with driving, sweeping with a broom, household activities... however continues to have pain up to 3-4/10 with lifting heavy objects or with prolonged grasp.  -EN         Subjective Pain    Able to rate subjective pain? yes  -EN      Pre-Treatment Pain Level 2  -EN      Post-Treatment Pain Level 2  -EN      Subjective Pain Comment Patient reports she moved her son into his apartment over the weekend and did a lot of cleaning and pain is \"not bad!\"  -EN         Left Hand Strength - Pinch (lbs)    Lateral 14 lbs  -EN         Right Upper Ext    Rt Upper Extremity Comments  R UE ROM WNL  -EN         Left Upper Ext    Lt Upper Extremity Comments  L UE ROM WNL  " -EN         MMT Left Upper Ext    Lt Elbow Flexion MMT, Gross Movement (5/5) normal  -EN      Lt Elbow Extension MMT, Gross Movement (5/5) normal  -EN      Lt Forearm Supination MMT, Gross Movement (5/5) normal  -EN      Lt Forearm Pronation MMT, Gross Movement (5/5) normal  with pain  -EN      Lt Wrist Flexion MMT, Gross Movement (5/5) normal  -EN      Lt Wrist Extension MMT, Gross Movement (5/5) normal  -EN      Lt Upper Extremity Comments  RD 5/5, UD 5/5  -EN                User Key  (r) = Recorded By, (t) = Taken By, (c) = Cosigned By      Initials Name Provider Type    Natalia Bettencourt OTR Occupational Therapist                    Hand Therapy (Last 24 Hours)       Hand Eval       Row Name 07/02/24 1400              Strength Left    # Reps 1  -EN      Left Rung 2  -EN      Left  Test 1 48  -EN       Strength Average Left 48  -EN         Therapy Education    Education Details Continue with HEP  -EN      Given HEP;Symptoms/condition management;Edema management  -EN      Program Reinforced;Progressed  -EN      How Provided Verbal;Demonstration  -EN      Provided to Patient  -EN      Level of Understanding Teach back education performed;Verbalized;Demonstrated  -EN                User Key  (r) = Recorded By, (t) = Taken By, (c) = Cosigned By      Initials Name Provider Type    Natalia Bettencourt OTR Occupational Therapist                        Therapy Education  Education Details: Continue with HEP  Given: HEP, Symptoms/condition management, Edema management  Program: Reinforced, Progressed  How Provided: Verbal, Demonstration  Provided to: Patient  Level of Understanding: Teach back education performed, Verbalized, Demonstrated     OT Goals       Row Name 07/02/24 1400          OT Short Term Goals    STG Date to Achieve 07/16/24  -EN     STG 1 Patient will demonstrate independence with HEP/edema management.  -EN     STG 1 Progress Met  -EN     STG 2 Patient will report decreased L  wrist/hand pain with use by 50%.  -EN     STG 2 Progress Met  -EN     STG 3 Patient will demonstrate improved left  strength 5# for improved grasp of objects.  -EN     STG 3 Progress Met  -EN     STG 4 Patient will report decreased wrist pain with activity by 50% (currently 4/10).  -EN     STG 4 Progress New  -EN        Long Term Goals    LTG Date to Achieve 07/30/24  -EN     LTG 1 Patient will demonstrate 5/5 wrist/forearm strength for improved ADL/IADL independence.  -EN     LTG 1 Progress Met  -EN     LTG 2 Patient will demonstrate improved L  strength by 15# or more for improved grasp of objects.  -EN     LTG 2 Progress Met  -EN     LTG 3 Improved Quick Dash 40 points or more.  -EN     LTG 3 Progress Progressing;Ongoing  -EN     LTG 4 Patient will report decreased pain with use by 75%.  -EN     LTG 4 Progress Progressing;Ongoing  -EN               User Key  (r) = Recorded By, (t) = Taken By, (c) = Cosigned By      Initials Name Provider Type    Natalia Bettencourt OTR Occupational Therapist                     OT Assessment/Plan       Row Name 07/02/24 1447          OT Assessment    Functional Limitations Limitation in home management;Performance in leisure activities;Performance in self-care ADL;Performance in work activities  -EN     Impairments Edema;Muscle strength;Pain;Sensation  -EN     Assessment Comments Patient has demonstrated significant improvement since evaluation. Patient's pain with activity has decreased from 10/10 to 3-4/10. Left  strength has improved from 29 pounds to 48 pounds, and lateral pinch has improved from 10 pounds to 14 pounds. Patient has also improved wrist and forearm strength. Patient states she is doing much better with ADL/IADLs and is now able to sweep the floor with a broom, hold the steering wheel, etc.. however is still having some pain with lifting heavy objects and with prolonged grasp. Patient has  decreased edema and she is progressing with  strengthening program. Patient will benefit from continued OT.  -EN     OT Diagnosis L wrist and hand pain, decreased strength, decreased ADL/IADL independence  -EN     OT Rehab Potential Good  -EN     Patient/caregiver participated in establishment of treatment plan and goals Yes  -EN        OT Plan    OT Frequency 1x/week  -EN     Predicted Duration of Therapy Intervention (OT) 4 weeks  -EN     OT Plan Comments Decrease to one time per week due to progress.  -EN               User Key  (r) = Recorded By, (t) = Taken By, (c) = Cosigned By      Initials Name Provider Type    Natalia Bettencourt OTR Occupational Therapist                      OT Exercises       Row Name 07/02/24 1400             Exercise 1    Exercise Name 1 retrograde edema massage left hand/wrist  -EN         Exercise 2    Exercise Name 2 ROM exercises wrist and thumb  -EN         Exercise 3    Exercise Name 3 wrist strengthening  -EN      Cueing 3 Verbal;Tactile;Demo  -EN      Equipment 3 Dumbell  -EN      Weights/Plates 3 4  -EN      Sets 3 3  -EN      Reps 3 15  -EN         Exercise 4    Exercise Name 4 wrist/forearm strengthening with therabar (supination, pronation, flexion and extension  -EN      Cueing 4 Verbal;Tactile;Demo  -EN      Equipment 4 Other  -EN      Resistance 4 Green  -EN      Sets 4 1  -EN      Reps 4 15  -EN         Exercise 5    Exercise Name 5 hand strengthening with blue digiflex (hold for 3 seconds X 15 reps) and hand gripper 25 pounds (hold 3 seconds X 15 reps)  -EN                User Key  (r) = Recorded By, (t) = Taken By, (c) = Cosigned By      Initials Name Provider Type    Natalia Bettencourt OTR Occupational Therapist                      Quick DASH  Open a tight or new jar.: Mild Difficulty  Do heavy household chores (e.g., wash walls, wash floors): Mild Difficulty  Carry a shopping bag or briefcase: Mild Difficulty  Wash your back: No Difficulty  Use a knife to cut food: Mild Difficulty  Recreational  activities in which you take some force or impact through your arm, should or hand (e.g. golf, hammering, tennis, etc.): Moderate Difficulty  During the past week, to what extent has your arm, shoulder, or hand problem interfered with your normal social activites with family, friends, neighbors or groups?: Slightly  During the past week, were you limited in your work or other regular daily activities as a result of your arm, shoulder or hand problem?: Slightly Limited  Arm, Shoulder, or hand pain: Mild  Tingling (pins and needles) in your arm, shoulder, or hand: None  During the past week, how much difficulty have you had sleeping because of the pain in your arm, shoulder or hand?: Mild Difficulty  Number of Questions Answered: 11  Quick DASH Score: 22.73         Time Calculation:    OT Start Time: 1400  OT Stop Time: 1435  OT Time Calculation (min): 35 min     Therapy Charges for Today       Code Description Service Date Service Provider Modifiers Qty    [id number]  OT RE-EVAL 2 7/2/2024 Natalia Barrios OTR GO 1                    ERA Stoner  7/2/2024

## 2024-07-02 NOTE — THERAPY TREATMENT NOTE
Outpatient Physical Therapy Ortho Treatment Note  [facility]     Patient Name: Jocelyn Ríos  : 1970  MRN: [id number]  Today's Date: 2024      Visit Date: 2024    Visit Dx:    ICD-10-CM ICD-9-CM   1. Neck pain  M54.2 723.1   2. Left knee pain, unspecified chronicity  M25.562 719.46       Patient Active Problem List   Diagnosis    Anxiety    Moderate persistent asthma without complication    Environmental allergies    Routine adult health maintenance    Complex regional pain syndrome i of left lower limb    Carpal tunnel syndrome on both sides    Status post arthroscopic surgery of left knee, DOS 10/21/2019    Intractable neuropathic pain of left knee    Chronic cervical pain    Chondromalacia of knee, left    Screening mammogram for breast cancer    Moderate mixed hyperlipidemia not requiring statin therapy        Past Medical History:   Diagnosis Date    Acute sinus infection     on poab    Anxiety     Arthritis     Asthma     STARTED @ 41 YRS OLD, DR. BUENO, & [facility] ALLERGY DR. LOPEZ    COVID-19     2021    CRPS (complex regional pain syndrome) type I of lower limb     Elevated cholesterol     Frequent UTI     GERD (gastroesophageal reflux disease)     History of 2019 novel coronavirus disease (COVID-19)     Iliotibial band syndrome affecting left lower leg     Itching     BLE has some scratches to notify MD if doesn't clear up prior to surgery    Kidney stones     Lateral collateral ligament deficiency of left knee 2016    Lateral meniscal tear     Lumbar paraspinal muscle spasm 2017    Meniscal cyst, left 2019    Migraines     Patellofemoral pain syndrome of left knee 2019    PONV (postoperative nausea and vomiting)     Seasonal allergies     Subchondral insufficiency fracture of femoral condyle, left, initial encounter 01/10/2020    Tear of lateral meniscus of left knee, current 2019        Past Surgical History:   Procedure Laterality  Date     SECTION      x2    DIAGNOSTIC LAPAROSCOPY      FOOT SURGERY Right     HERNIA REPAIR Bilateral     inguinal hernia age 6    INTRAUTERINE DEVICE INSERTION      KNEE ARTHROSCOPY Left 10/21/2016    Procedure: KNEE ARTHROSCOPY debridement of lateral meniscal tear;  Surgeon: Son Mojica MD;  Location:  [facility];  Service:     KNEE ARTHROSCOPY Left 2018    Procedure: KNEE ARTHROSCOPY;  Surgeon: Son Mojica MD;  Location:  [facility];  Service: Orthopedics    KNEE ARTHROSCOPY Left 2021    Procedure: LEFT KNEE DIAGNOSTIC  ARTHROSCOPY AND PARTLATERAL MENISECTOMY AND REMOVAL OF MULTIPLE FIXATION DEVICES;  Surgeon: Rodríguez Hamlin MD;  Location:  [facility];  Service: Orthopedics;  Laterality: Left;    KNEE MENISCAL REPAIR Left 10/21/2019    Procedure: knee arthroscopy, debridement of meniscal cyst, and lateral meniscal repair;  Surgeon: Bridger Figueroa MD;  Location:  [facility];  Service: Orthopedics    NERVE BLOCK Left 2021    Procedure: KNEE GENICULAR NERVE BLOCK UNDER FLUORO- x2, 3-4 wks apart;  Surgeon: James Spivey MD;  Location: [facility];  Service: Pain Management;  Laterality: Left;        PT Ortho       Row Name 24 1400       Subjective    Subjective Comments pt states she helped move her son over the weekend; mostly unpacked boxes - increased tightness throughout (B) shoulders, scapular area but able to manage with heat, massage and OTC meds; feels she continues to get significant benefit from coming to therapy,especially in regards to her neck  -              User Key  (r) = Recorded By, (t) = Taken By, (c) = Cosigned By      Initials Name Provider Type     Alec Ribera, PTA Physical Therapist Assistant                                 PT Assessment/Plan       Row Name 24 1619          PT Assessment    Assessment Comments pt continues to report benefit from traction and is tolerating current HEP well for both neck and (L) knee; good tolerance to  pogression of theraband  -        PT Plan    PT Plan Comments Cont per POC, check response to addition of corner stretch -               User Key  (r) = Recorded By, (t) = Taken By, (c) = Cosigned By      Initials Name Provider Type     Bacilio Alecroberto Alarcon PTA Physical Therapist Assistant                     Modalities       Row Name 07/02/24 1400             Moist Heat    Location Cervical spine & L knee (anterior and posterior) with pt supine in hooklying.  -      PT Moist Heat Minutes 10  -      MH S/P Rx Yes  after exercises and before ICTX  -         Traction 44022    Traction Type Cervical  with MH across shoulders  -      PT Traction Rx Minutes 15  -MH      Position Hook-lying  -      Weight 14  Pull decreased this session due only to difference between traction units, not because pt did not tolerate  -      Hold 60  -      Relax 20  -                User Key  (r) = Recorded By, (t) = Taken By, (c) = Cosigned By      Initials Name Provider Type     Bacilio Alecroberto Alarcon PTA Physical Therapist Assistant                   OP Exercises       Row Name 07/02/24 1622 07/02/24 1400          Precautions    Existing Precautions/Restrictions -- no known precautions/restrictions  -        Subjective    Subjective Comments -- pt states she helped move her son over the weekend; mostly unpacked boxes - increased tightness throughout (B) shoulders, scapular area but able to manage with heat, massage and OTC meds; feels she continues to get significant benefit from coming to therapy,especially in regards to her neck  -        Total Minutes    48328 - PT Therapeutic Exercise Minutes 30  -MH --        Exercise 1    Exercise Name 1 -- QS over single towel roll  -     Cueing 1 -- Verbal;Tactile;Demo  -     Reps 1 -- 35  -MH     Time 1 -- 5 sec  -        Exercise 2    Exercise Name 2 -- SAQ ball squeeze  -     Cueing 2 -- Verbal;Tactile;Demo  -     Reps 2 -- 30  -MH     Time 2 -- 3-5 sec  -         Exercise 3    Exercise Name 3 -- SLR  -MH     Cueing 3 -- Verbal;Tactile;Demo  -MH     Reps 3 -- 35  -MH     Time 3 -- 1.5#  -MH        Exercise 4    Exercise Name 4 -- sidelying hip abduction  -MH     Cueing 4 -- Verbal;Tactile;Demo  -MH     Reps 4 -- 35  -MH     Time 4 -- 1.5#  -        Exercise 5    Exercise Name 5 -- sidelying hip adduction  -MH     Cueing 5 -- Verbal;Tactile;Demo  -MH     Reps 5 -- 35  -MH     Time 5 -- 1.5#  -MH        Exercise 6    Exercise Name 6 -- Active cervical SB/UT stretch  -        Exercise 7    Exercise Name 7 -- Levator scapula stretch  -        Exercise 8    Exercise Name 8 -- Scapular retraction  -        Exercise 9    Exercise Name 9 -- hooklying ball squeeze  -MH     Cueing 9 -- Verbal;Tactile;Demo  -     Reps 9 -- 35  -MH     Time 9 -- 5 sec  -MH        Exercise 10    Exercise Name 10 -- heel raises  -MH     Cueing 10 -- Verbal  -MH     Reps 10 -- 35  -MH        Exercise 11    Exercise Name 11 -- TKE vs TB  -MH     Cueing 11 -- Verbal;Demo  -MH     Reps 11 -- 35  -MH     Time 11 -- silver  -MH        Exercise 12    Exercise Name 12 -- Prone leg raises  -MH     Cueing 12 -- Verbal;Tactile;Demo  -MH     Reps 12 -- 35  -MH     Time 12 -- 1.5#  -        Exercise 13    Exercise Name 13 -- Bridge with ball  -MH     Cueing 13 -- Verbal;Tactile;Demo  -     Reps 13 -- 35  -MH     Time 13 -- 5 sec  -MH        Exercise 14    Exercise Name 14 -- corner stretch  -MH     Cueing 14 -- Verbal;Demo  -     Reps 14 -- 5  -MH     Time 14 -- 10 sec  -MH               User Key  (r) = Recorded By, (t) = Taken By, (c) = Cosigned By      Initials Name Provider Type    Alec Leonard, PTA Physical Therapist Assistant                                     Therapy Education  Education Details: silver band issued for home; pt declined written instruction of corner stretch  Given: HEP, Symptoms/condition management  Program: New, Reinforced, Progressed  How Provided: Verbal,  Demonstration  Provided to: Patient  Level of Understanding: Teach back education performed, Verbalized, Demonstrated              Time Calculation:   Start Time: 1436  Stop Time: 1553  Time Calculation (min): 77 min  Timed Charges  97825 - PT Therapeutic Exercise Minutes: 30  Untimed Charges  PT Traction Rx Minutes: 15  PT Moist Heat Minutes: 10  Total Minutes  Timed Charges Total Minutes: 30  Untimed Charges Total Minutes: 25   Total Minutes: 30  Therapy Charges for Today       Code Description Service Date Service Provider Modifiers Qty    74783192789 HC PT THER PROC EA 15 MIN 7/2/2024 Alec Ribera, PTA GP 2    24359814852 HC PT TRACTION CERVICAL 7/2/2024 Alec Ribera PTA GP 1                      Alec Ribera PTA  7/2/2024

## 2024-07-12 ENCOUNTER — HOSPITAL ENCOUNTER (OUTPATIENT)
Dept: OCCUPATIONAL THERAPY | Facility: HOSPITAL | Age: 54
Setting detail: THERAPIES SERIES
Discharge: HOME | End: 2024-07-12
Payer: OTHER MISCELLANEOUS

## 2024-07-12 ENCOUNTER — HOSPITAL ENCOUNTER (OUTPATIENT)
Dept: PHYSICAL THERAPY | Facility: HOSPITAL | Age: 54
Setting detail: THERAPIES SERIES
Discharge: HOME | End: 2024-07-12
Payer: OTHER MISCELLANEOUS

## 2024-07-12 DIAGNOSIS — M79.642 LEFT HAND PAIN: Primary | ICD-10-CM

## 2024-07-12 DIAGNOSIS — M25.532 LEFT WRIST PAIN: ICD-10-CM

## 2024-07-12 DIAGNOSIS — M54.2 NECK PAIN: Primary | ICD-10-CM

## 2024-07-12 DIAGNOSIS — M25.562 LEFT KNEE PAIN, UNSPECIFIED CHRONICITY: ICD-10-CM

## 2024-07-12 PROCEDURE — 97530 THERAPEUTIC ACTIVITIES: CPT

## 2024-07-12 PROCEDURE — 97110 THERAPEUTIC EXERCISES: CPT

## 2024-07-12 PROCEDURE — 97012 MECHANICAL TRACTION THERAPY: CPT

## 2024-07-12 NOTE — THERAPY TREATMENT NOTE
Outpatient Physical Therapy Ortho Treatment Note  [facility]     Patient Name: Jocelyn Ríos  : 1970  MRN: [id number]  Today's Date: 2024      Visit Date: 2024    Visit Dx:    ICD-10-CM ICD-9-CM   1. Neck pain  M54.2 723.1   2. Left knee pain, unspecified chronicity  M25.562 719.46       Patient Active Problem List   Diagnosis    Anxiety    Moderate persistent asthma without complication    Environmental allergies    Routine adult health maintenance    Complex regional pain syndrome i of left lower limb    Carpal tunnel syndrome on both sides    Status post arthroscopic surgery of left knee, DOS 10/21/2019    Intractable neuropathic pain of left knee    Chronic cervical pain    Chondromalacia of knee, left    Screening mammogram for breast cancer    Moderate mixed hyperlipidemia not requiring statin therapy        Past Medical History:   Diagnosis Date    Acute sinus infection     on poab    Anxiety     Arthritis     Asthma     STARTED @ 41 YRS OLD, DR. BUENO, & [facility]. LOPEZ    COVID-19     2021    CRPS (complex regional pain syndrome) type I of lower limb     Elevated cholesterol     Frequent UTI     GERD (gastroesophageal reflux disease)     History of 2019 novel coronavirus disease (COVID-19)     Iliotibial band syndrome affecting left lower leg     Itching     BLE has some scratches to notify MD if doesn't clear up prior to surgery    Kidney stones     Lateral collateral ligament deficiency of left knee 2016    Lateral meniscal tear     Lumbar paraspinal muscle spasm 2017    Meniscal cyst, left 2019    Migraines     Patellofemoral pain syndrome of left knee 2019    PONV (postoperative nausea and vomiting)     Seasonal allergies     Subchondral insufficiency fracture of femoral condyle, left, initial encounter 01/10/2020    Tear of lateral meniscus of left knee, current 2019        Past Surgical History:   Procedure  "Laterality Date     SECTION      x2    DIAGNOSTIC LAPAROSCOPY      FOOT SURGERY Right     HERNIA REPAIR Bilateral     inguinal hernia age 6    INTRAUTERINE DEVICE INSERTION      KNEE ARTHROSCOPY Left 10/21/2016    Procedure: KNEE ARTHROSCOPY debridement of lateral meniscal tear;  Surgeon: Son Mojica MD;  Location:  [facility];  Service:     KNEE ARTHROSCOPY Left 2018    Procedure: KNEE ARTHROSCOPY;  Surgeon: Son Mojica MD;  Location:  [facility];  Service: Orthopedics    KNEE ARTHROSCOPY Left 2021    Procedure: LEFT KNEE DIAGNOSTIC  ARTHROSCOPY AND PARTLATERAL MENISECTOMY AND REMOVAL OF MULTIPLE FIXATION DEVICES;  Surgeon: Rodríguez Hamlin MD;  Location:  [facility];  Service: Orthopedics;  Laterality: Left;    KNEE MENISCAL REPAIR Left 10/21/2019    Procedure: knee arthroscopy, debridement of meniscal cyst, and lateral meniscal repair;  Surgeon: Bridger Figueroa MD;  Location:  [facility];  Service: Orthopedics    NERVE BLOCK Left 2021    Procedure: KNEE GENICULAR NERVE BLOCK UNDER FLUORO- x2, 3-4 wks apart;  Surgeon: James Spivey MD;  Location: [facility] OR;  Service: Pain Management;  Laterality: Left;        PT Ortho       Row Name 24 1100       Subjective    Subjective Comments pt states she had been doing well until yesterday after doing a \"deep clean\" of the kitchen  -              User Key  (r) = Recorded By, (t) = Taken By, (c) = Cosigned By      Initials Name Provider Type    Alec Leonard PTA Physical Therapist Assistant                                 PT Assessment/Plan       Row Name 24 1316          PT Assessment    Assessment Comments pt continues to have increased neck/shoulder symptoms with extended activity; tolerating current ex's well  -        PT Plan    PT Plan Comments Cont per POC  -               User Key  (r) = Recorded By, (t) = Taken By, (c) = Cosigned By      Initials Name Provider Type    Alec Leonard PTA Physical " "Therapist Assistant                     Modalities       Row Name 07/12/24 1100             Precautions    Existing Precautions/Restrictions no known precautions/restrictions  -         Moist Heat    Location Cervical spine & L knee (anterior and posterior) with pt supine in hooklying.  -      PT Moist Heat Minutes 12  -MH      MH S/P Rx Yes  after exercises and before ICTX  -         Traction 62787    Traction Type Cervical  with MH across shoulders  -      PT Traction Rx Minutes 15  -MH      Position Hook-lying  -      Weight 14  Pull decreased this session due only to difference between traction units, not because pt did not tolerate  -      Hold 60  -MH      Relax 20  -MH                User Key  (r) = Recorded By, (t) = Taken By, (c) = Cosigned By      Initials Name Provider Type     Alec Ribera, PTA Physical Therapist Assistant                   OP Exercises       Row Name 07/12/24 1100             Precautions    Existing Precautions/Restrictions no known precautions/restrictions  -         Subjective    Subjective Comments pt states she had been doing well until yesterday after doing a \"deep clean\" of the kitchen  -         Exercise 1    Exercise Name 1 QS over single towel roll  -      Cueing 1 Verbal;Tactile;Demo  -MH      Reps 1 35  -MH      Time 1 5 sec  -MH         Exercise 2    Exercise Name 2 SAQ ball squeeze  -MH      Cueing 2 Verbal;Tactile;Demo  -MH      Reps 2 35  -MH      Time 2 3-5 sec  -MH         Exercise 3    Exercise Name 3 SLR  -MH      Cueing 3 Verbal;Tactile;Demo  -MH      Reps 3 35  -MH      Time 3 1.5#  -MH         Exercise 4    Exercise Name 4 sidelying hip abduction  -MH      Cueing 4 Verbal;Tactile;Demo  -MH      Reps 4 35  -MH      Time 4 1.5#  -MH         Exercise 5    Exercise Name 5 sidelying hip adduction  -      Cueing 5 Verbal;Tactile;Demo  -MH      Reps 5 35  -MH      Time 5 1.5#  -MH         Exercise 6    Exercise Name 6 Active cervical SB/UT stretch "  -MH         Exercise 7    Exercise Name 7 Levator scapula stretch  -MH         Exercise 8    Exercise Name 8 Scapular retraction  -MH         Exercise 9    Exercise Name 9 hooklying ball squeeze  -MH      Cueing 9 Verbal;Tactile;Demo  -MH      Reps 9 35  -MH      Time 9 5 sec  -MH         Exercise 10    Exercise Name 10 heel raises  -MH      Cueing 10 Verbal  -MH      Reps 10 35  -MH         Exercise 11    Exercise Name 11 TKE vs TB  -MH      Cueing 11 Verbal;Demo  -MH      Reps 11 35  -MH      Time 11 silver  -MH         Exercise 12    Exercise Name 12 Prone leg raises  -MH      Cueing 12 Verbal;Tactile;Demo  -MH      Reps 12 35  -MH      Time 12 1.5#  -MH         Exercise 13    Exercise Name 13 Bridge with ball  -MH      Cueing 13 Verbal;Tactile;Demo  -MH      Reps 13 35  -MH      Time 13 5 sec  -MH         Exercise 14    Exercise Name 14 corner stretch  -MH      Sets 14 HEP  -MH                User Key  (r) = Recorded By, (t) = Taken By, (c) = Cosigned By      Initials Name Provider Type     Alec Ribera PTA Physical Therapist Assistant                                     Therapy Education  Given: HEP, Symptoms/condition management  Program: Reinforced  How Provided: Verbal  Provided to: Patient  Level of Understanding: Teach back education performed, Verbalized, Demonstrated              Time Calculation:   Start Time: 1050  Stop Time: 1200  Time Calculation (min): 70 min  Untimed Charges  PT Traction Rx Minutes: 15  PT Moist Heat Minutes: 12  Total Minutes  Untimed Charges Total Minutes: 27   Total Minutes: 27  Therapy Charges for Today       Code Description Service Date Service Provider Modifiers Qty    50576288061 HC PT TRACTION CERVICAL 7/12/2024 Alec Ribera PTA GP 1    04258141393 HC PT THER PROC EA 15 MIN 7/12/2024 Alec Ribera PTA GP 1                      Alec Ribera PTA  7/12/2024

## 2024-07-12 NOTE — THERAPY TREATMENT NOTE
Outpatient Occupational Therapy Ortho Treatment Note  [facility]     Patient Name: Jocelyn Ríos  : 1970  MRN: [id number]  Today's Date: 2024        Visit Date: 2024    Patient Active Problem List   Diagnosis    Anxiety    Moderate persistent asthma without complication    Environmental allergies    Routine adult health maintenance    Complex regional pain syndrome i of left lower limb    Carpal tunnel syndrome on both sides    Status post arthroscopic surgery of left knee, DOS 10/21/2019    Intractable neuropathic pain of left knee    Chronic cervical pain    Chondromalacia of knee, left    Screening mammogram for breast cancer    Moderate mixed hyperlipidemia not requiring statin therapy        Past Medical History:   Diagnosis Date    Acute sinus infection     on poab    Anxiety     Arthritis     Asthma     STARTED @ 41 YRS OLD, DR. BUENO, & [facility] ALLERGY DR. LOPEZ    COVID-19     2021    CRPS (complex regional pain syndrome) type I of lower limb     Elevated cholesterol     Frequent UTI     GERD (gastroesophageal reflux disease)     History of 2019 novel coronavirus disease (COVID-19)     Iliotibial band syndrome affecting left lower leg     Itching     BLE has some scratches to notify MD if doesn't clear up prior to surgery    Kidney stones     Lateral collateral ligament deficiency of left knee 2016    Lateral meniscal tear     Lumbar paraspinal muscle spasm 2017    Meniscal cyst, left 2019    Migraines     Patellofemoral pain syndrome of left knee 2019    PONV (postoperative nausea and vomiting)     Seasonal allergies     Subchondral insufficiency fracture of femoral condyle, left, initial encounter 01/10/2020    Tear of lateral meniscus of left knee, current 2019        Past Surgical History:   Procedure Laterality Date     SECTION      x2    DIAGNOSTIC LAPAROSCOPY      FOOT SURGERY Right     HERNIA REPAIR Bilateral      inguinal hernia age 6    INTRAUTERINE DEVICE INSERTION      KNEE ARTHROSCOPY Left 10/21/2016    Procedure: KNEE ARTHROSCOPY debridement of lateral meniscal tear;  Surgeon: Son Mojica MD;  Location:  [facility];  Service:     KNEE ARTHROSCOPY Left 7/27/2018    Procedure: KNEE ARTHROSCOPY;  Surgeon: Son Mojica MD;  Location:  [facility];  Service: Orthopedics    KNEE ARTHROSCOPY Left 12/2/2021    Procedure: LEFT KNEE DIAGNOSTIC  ARTHROSCOPY AND PARTLATERAL MENISECTOMY AND REMOVAL OF MULTIPLE FIXATION DEVICES;  Surgeon: Rodríguez Hamlin MD;  Location:  [facility];  Service: Orthopedics;  Laterality: Left;    KNEE MENISCAL REPAIR Left 10/21/2019    Procedure: knee arthroscopy, debridement of meniscal cyst, and lateral meniscal repair;  Surgeon: Bridger Figueroa MD;  Location:  [facility];  Service: Orthopedics    NERVE BLOCK Left 5/25/2021    Procedure: KNEE GENICULAR NERVE BLOCK UNDER FLUORO- x2, 3-4 wks apart;  Surgeon: James Spivey MD;  Location: [facility];  Service: Pain Management;  Laterality: Left;         Visit Dx:    ICD-10-CM ICD-9-CM   1. Left hand pain  M79.642 729.5   2. Left wrist pain  M25.532 719.43        OT Ortho       Row Name 07/12/24 1000             Subjective Comments    Subjective Comments Patient reports she has a doctors appointment on Monday.  -EN         Subjective Pain    Subjective Pain Comment Patient reports she had to have assist  to turn screwdriver, reported pain in ulnar and thumb with turning screwdriver.  -EN         LUE Edema - Circumference (cm)    Wrist Crease 14.3 cm  -EN                User Key  (r) = Recorded By, (t) = Taken By, (c) = Cosigned By      Initials Name Provider Type    EN Natalia Barrios OTR Occupational Therapist                    Hand Therapy (Last 24 Hours)       Hand Eval       Row Name 07/12/24 1000              Strength Left    # Reps 1  -EN      Left Rung 2  -EN      Left  Test 1 52  -EN       Strength Average Left 52  -EN          Therapy Education    Given HEP;Symptoms/condition management;Edema management  -EN      Program Reinforced  -EN      How Provided Verbal;Demonstration  -EN      Provided to Patient  -EN      Level of Understanding Teach back education performed;Verbalized;Demonstrated  -EN                User Key  (r) = Recorded By, (t) = Taken By, (c) = Cosigned By      Initials Name Provider Type    Natalia Bettencourt OTR Occupational Therapist                        Therapy Education  Education Details: increased to 5#  Given: HEP, Symptoms/condition management, Edema management  Program: Reinforced, Progressed  How Provided: Verbal, Demonstration  Provided to: Patient  Level of Understanding: Teach back education performed, Verbalized, Demonstrated     OT Assessment/Plan       Row Name 07/12/24 1058          OT Assessment    Assessment Comments Patient has no pain at rest however with certain activities reports sharp pain in ulnar wrist and thumb that reaches 4/10 (i.e. using a screwdriver). Patient continues to progress with strengthening program and  strength has improved to 52#. Patient sees MD on Monday, may benefit from MRI.  -EN     OT Diagnosis L wrist and hand pain, decreased strength, decreased ADL/IADL independence  -EN     OT Rehab Potential Good  -EN        OT Plan    OT Plan Comments MD appointment Monday.  -EN               User Key  (r) = Recorded By, (t) = Taken By, (c) = Cosigned By      Initials Name Provider Type    Natalia Bettencourt OTR Occupational Therapist                      OT Goals       Row Name 07/12/24 1000          OT Short Term Goals    STG Date to Achieve 07/16/24  -EN     STG 1 Patient will demonstrate independence with HEP/edema management.  -EN     STG 1 Progress Met  -EN     STG 2 Patient will report decreased L wrist/hand pain with use by 50%.  -EN     STG 2 Progress Met  -EN     STG 3 Patient will demonstrate improved left  strength 5# for improved grasp of  "objects.  -EN     STG 3 Progress Met  -EN     STG 4 Patient will report decreased wrist pain with activity by 50% (currently 4/10).  -EN     STG 4 Progress Ongoing  -EN     STG 4 Progress Comments continues to be 4/10 (\"sharp pain\")  -EN        Long Term Goals    LTG Date to Achieve 07/30/24  -EN     LTG 1 Patient will demonstrate 5/5 wrist/forearm strength for improved ADL/IADL independence.  -EN     LTG 1 Progress Met  -EN     LTG 2 Patient will demonstrate improved L  strength by 15# or more for improved grasp of objects.  -EN     LTG 2 Progress Met  -EN     LTG 3 Improved Quick Dash 40 points or more.  -EN     LTG 3 Progress Progressing;Ongoing  -EN     LTG 4 Patient will report decreased pain with use by 75%.  -EN     LTG 4 Progress Progressing;Ongoing  -EN               User Key  (r) = Recorded By, (t) = Taken By, (c) = Cosigned By      Initials Name Provider Type    Natalia Bettencourt OTR Occupational Therapist                     Modalities       Row Name 07/12/24 1000             Moist Heat    MH Applied Yes  -EN      Location L hand/wrist  -EN      OT Moist Heat Minutes 12  -EN      MH Prior to Rx Yes  -EN                User Key  (r) = Recorded By, (t) = Taken By, (c) = Cosigned By      Initials Name Provider Type    Natalia Bettencourt OTR Occupational Therapist                   OT Exercises       Row Name 07/12/24 1000             Subjective Pain    Able to rate subjective pain? yes  -EN         Exercise 1    Exercise Name 1 retrograde edema massage left hand/wrist  -EN         Exercise 2    Exercise Name 2 ROM exercises wrist and thumb  -EN         Exercise 3    Exercise Name 3 wrist strengthening  -EN      Cueing 3 Verbal;Tactile;Demo  -EN      Equipment 3 Dumbell  -EN      Weights/Plates 3 5  -EN      Sets 3 1  -EN      Reps 3 15  -EN         Exercise 4    Exercise Name 4 wrist/forearm strengthening with therabar (supination, pronation, flexion and extension  -EN      Cueing 4 " Verbal;Tactile;Demo  -EN      Equipment 4 Other  -EN      Resistance 4 Green  -EN      Sets 4 1  -EN      Reps 4 20  -EN         Exercise 5    Exercise Name 5 hand strengthening with blue digiflex (hold for 3 seconds X 15 reps) and hand gripper 25 pounds (hold 3 seconds X 15 reps)  -EN      Cueing 5 Verbal  -EN                User Key  (r) = Recorded By, (t) = Taken By, (c) = Cosigned By      Initials Name Provider Type    Natalia Bettencourt OTR Occupational Therapist                                  Time Calculation:   OT Start Time: 1007  OT Stop Time: 1049  OT Time Calculation (min): 42 min  Timed Charges  41509 - OT Therapeutic Activity Minutes: 30  Untimed Charges  OT Moist Heat Minutes: 12  Total Minutes  Timed Charges Total Minutes: 30  Untimed Charges Total Minutes: 12   Total Minutes: 30     Therapy Charges for Today       Code Description Service Date Service Provider Modifiers Qty    85155722980 HC OT THERAPEUTIC ACT EA 15 MIN 7/12/2024 Natalia Barrios OTR GO 2    47080661673 HC OT HOT OR COLD PACK TREAT [facility] 7/12/2024 Natalia Barrios OTR GO 1                      ERA Stoner  7/12/2024

## 2024-07-17 ENCOUNTER — HOSPITAL ENCOUNTER (OUTPATIENT)
Dept: PHYSICAL THERAPY | Facility: HOSPITAL | Age: 54
Setting detail: THERAPIES SERIES
Discharge: HOME | End: 2024-07-17
Payer: OTHER MISCELLANEOUS

## 2024-07-17 ENCOUNTER — HOSPITAL ENCOUNTER (OUTPATIENT)
Dept: OCCUPATIONAL THERAPY | Facility: HOSPITAL | Age: 54
Setting detail: THERAPIES SERIES
Discharge: HOME | End: 2024-07-17
Payer: OTHER MISCELLANEOUS

## 2024-07-17 DIAGNOSIS — M54.2 NECK PAIN: Primary | ICD-10-CM

## 2024-07-17 DIAGNOSIS — M25.532 LEFT WRIST PAIN: ICD-10-CM

## 2024-07-17 DIAGNOSIS — M79.642 LEFT HAND PAIN: Primary | ICD-10-CM

## 2024-07-17 DIAGNOSIS — M25.562 LEFT KNEE PAIN, UNSPECIFIED CHRONICITY: ICD-10-CM

## 2024-07-17 PROCEDURE — 97012 MECHANICAL TRACTION THERAPY: CPT

## 2024-07-17 PROCEDURE — 97110 THERAPEUTIC EXERCISES: CPT

## 2024-07-17 PROCEDURE — 97530 THERAPEUTIC ACTIVITIES: CPT

## 2024-07-17 NOTE — THERAPY TREATMENT NOTE
Outpatient Occupational Therapy Ortho Treatment Note  [facility]     Patient Name: Jocelyn Ríos  : 1970  MRN: [id number]  Today's Date: 2024        Visit Date: 2024    Patient Active Problem List   Diagnosis    Anxiety    Moderate persistent asthma without complication    Environmental allergies    Routine adult health maintenance    Complex regional pain syndrome i of left lower limb    Carpal tunnel syndrome on both sides    Status post arthroscopic surgery of left knee, DOS 10/21/2019    Intractable neuropathic pain of left knee    Chronic cervical pain    Chondromalacia of knee, left    Screening mammogram for breast cancer    Moderate mixed hyperlipidemia not requiring statin therapy        Past Medical History:   Diagnosis Date    Acute sinus infection     on poab    Anxiety     Arthritis     Asthma     STARTED @ 41 YRS OLD, DR. BUENO, & [facility] ALLERGY DR. LOPEZ    COVID-19     2021    CRPS (complex regional pain syndrome) type I of lower limb     Elevated cholesterol     Frequent UTI     GERD (gastroesophageal reflux disease)     History of 2019 novel coronavirus disease (COVID-19)     Iliotibial band syndrome affecting left lower leg     Itching     BLE has some scratches to notify MD if doesn't clear up prior to surgery    Kidney stones     Lateral collateral ligament deficiency of left knee 2016    Lateral meniscal tear     Lumbar paraspinal muscle spasm 2017    Meniscal cyst, left 2019    Migraines     Patellofemoral pain syndrome of left knee 2019    PONV (postoperative nausea and vomiting)     Seasonal allergies     Subchondral insufficiency fracture of femoral condyle, left, initial encounter 01/10/2020    Tear of lateral meniscus of left knee, current 2019        Past Surgical History:   Procedure Laterality Date     SECTION      x2    DIAGNOSTIC LAPAROSCOPY      FOOT SURGERY Right     HERNIA REPAIR Bilateral      inguinal hernia age 6    INTRAUTERINE DEVICE INSERTION      KNEE ARTHROSCOPY Left 10/21/2016    Procedure: KNEE ARTHROSCOPY debridement of lateral meniscal tear;  Surgeon: Son Mojica MD;  Location:  [facility];  Service:     KNEE ARTHROSCOPY Left 7/27/2018    Procedure: KNEE ARTHROSCOPY;  Surgeon: Son Mojica MD;  Location:  [facility];  Service: Orthopedics    KNEE ARTHROSCOPY Left 12/2/2021    Procedure: LEFT KNEE DIAGNOSTIC  ARTHROSCOPY AND PARTLATERAL MENISECTOMY AND REMOVAL OF MULTIPLE FIXATION DEVICES;  Surgeon: Rodríguez Hamlin MD;  Location:  [facility];  Service: Orthopedics;  Laterality: Left;    KNEE MENISCAL REPAIR Left 10/21/2019    Procedure: knee arthroscopy, debridement of meniscal cyst, and lateral meniscal repair;  Surgeon: Bridger Figueroa MD;  Location:  [facility];  Service: Orthopedics    NERVE BLOCK Left 5/25/2021    Procedure: KNEE GENICULAR NERVE BLOCK UNDER FLUORO- x2, 3-4 wks apart;  Surgeon: James Spivey MD;  Location: [facility] [place] [facility] OR;  Service: Pain Management;  Laterality: Left;         Visit Dx:    ICD-10-CM ICD-9-CM   1. Left hand pain  M79.642 729.5   2. Left wrist pain  M25.532 719.43        OT Ortho       Row Name 07/17/24 1500             Subjective Comments    Subjective Comments Patient reports her wrist feels like it's throbbing today.  -EN         Subjective Pain    Able to rate subjective pain? yes  -EN      Subjective Pain Comment Patient reports ulnar wrist pain and first dorsal compartment pain continues with certain activities.  -EN                User Key  (r) = Recorded By, (t) = Taken By, (c) = Cosigned By      Initials Name Provider Type    Natalia Bettencourt, OTR Occupational Therapist                    Hand Therapy (Last 24 Hours)       Hand Eval       Row Name 07/17/24 1500              Strength Left    # Reps 1  -EN      Left Rung 2  -EN      Left  Test 1 50  -EN       Strength Average Left 50  -EN         Therapy Education    Given  "HEP;Symptoms/condition management;Edema management  -EN      Program Reinforced  -EN      How Provided Verbal;Demonstration  -EN      Provided to Patient  -EN      Level of Understanding Teach back education performed;Verbalized;Demonstrated  -EN                User Key  (r) = Recorded By, (t) = Taken By, (c) = Cosigned By      Initials Name Provider Type    Natalia Bettencourt OTR Occupational Therapist                        Therapy Education  Given: HEP, Symptoms/condition management, Edema management  Program: Reinforced  How Provided: Verbal, Demonstration  Provided to: Patient  Level of Understanding: Teach back education performed, Verbalized, Demonstrated     OT Assessment/Plan       Row Name 07/17/24 7695          OT Assessment    Assessment Comments Patient reports she saw the doctor this week and he has ordered a MRI of her wrist. Patient reports her ROM and strength are good however she has a throbbing pain occasionally with certain activities such as lifting with palm supinated, \"It hurts when I lift the detergent bottle.\" Patient's reports pain continues to be in first dorsal compartment and at ulnar wrist and states wearing the tubigrip does help with the pain. Reinforced edema managment and HEP.  -EN     OT Diagnosis L wrist and hand pain, decreased strength, decreased ADL/IADL independence  -EN     OT Rehab Potential Good  -EN        OT Plan    OT Plan Comments Continue with goals. MRI next week.  -EN               User Key  (r) = Recorded By, (t) = Taken By, (c) = Cosigned By      Initials Name Provider Type    Natalia Bettencourt OTR Occupational Therapist                      OT Goals       Row Name 07/17/24 1500          OT Short Term Goals    STG Date to Achieve 07/16/24  -EN     STG 1 Patient will demonstrate independence with HEP/edema management.  -EN     STG 1 Progress Met  -EN     STG 2 Patient will report decreased L wrist/hand pain with use by 50%.  -EN     STG 2 Progress Met  " -EN     STG 3 Patient will demonstrate improved left  strength 5# for improved grasp of objects.  -EN     STG 3 Progress Met  -EN     STG 4 Patient will report decreased wrist pain with activity by 50% (currently 4/10).  -EN     STG 4 Progress Ongoing  -EN        Long Term Goals    LTG Date to Achieve 07/30/24  -EN     LTG 1 Patient will demonstrate 5/5 wrist/forearm strength for improved ADL/IADL independence.  -EN     LTG 1 Progress Met  -EN     LTG 2 Patient will demonstrate improved L  strength by 15# or more for improved grasp of objects.  -EN     LTG 2 Progress Met  -EN     LTG 3 Improved Quick Dash 40 points or more.  -EN     LTG 3 Progress Ongoing  -EN     LTG 4 Patient will report decreased pain with use by 75%.  -EN     LTG 4 Progress Ongoing  -EN               User Key  (r) = Recorded By, (t) = Taken By, (c) = Cosigned By      Initials Name Provider Type    Natalia Bettencourt OTR Occupational Therapist                     Modalities       Row Name 07/17/24 1500             Moist Heat    MH Applied Yes  -EN      Location L hand/wrist  -EN      OT Moist Heat Minutes 12  -EN      MH Prior to Rx Yes  -EN                User Key  (r) = Recorded By, (t) = Taken By, (c) = Cosigned By      Initials Name Provider Type    Natalia Bettencourt, OTR Occupational Therapist                   OT Exercises       Row Name 07/17/24 1500             Exercise 1    Exercise Name 1 retrograde edema massage left hand/wrist  -EN         Exercise 2    Exercise Name 2 ROM exercises wrist and thumb  -EN         Exercise 3    Exercise Name 3 wrist strengthening  -EN      Cueing 3 Verbal;Tactile;Demo  -EN      Equipment 3 Dumbell  -EN      Weights/Plates 3 5  -EN      Sets 3 1  -EN      Reps 3 15  -EN         Exercise 4    Exercise Name 4 wrist/forearm strengthening with therabar (supination, pronation, flexion and extension  -EN      Cueing 4 Verbal;Tactile;Demo  -EN      Equipment 4 Other  -EN      Resistance 4  Green  -EN      Sets 4 1  -EN      Reps 4 20  -EN         Exercise 5    Exercise Name 5 hand strengthening with blue digiflex (hold for 3 seconds X 15 reps) and hand gripper 25 pounds (hold 3 seconds X 15 reps)  -EN      Cueing 5 Verbal  -EN                User Key  (r) = Recorded By, (t) = Taken By, (c) = Cosigned By      Initials Name Provider Type    EN Natalia Barrios OTR Occupational Therapist                                  Time Calculation:   OT Start Time: 1513  OT Stop Time: 1551  OT Time Calculation (min): 38 min  Timed Charges  98744 - OT Therapeutic Activity Minutes: 26  Untimed Charges  OT Moist Heat Minutes: 12  Total Minutes  Timed Charges Total Minutes: 26  Untimed Charges Total Minutes: 12   Total Minutes: 26     Therapy Charges for Today       Code Description Service Date Service Provider Modifiers Qty    90160080661 HC OT THERAPEUTIC ACT EA 15 MIN 7/17/2024 Natalia Barrios OTR GO 2    66177521023 HC OT HOT OR COLD PACK TREAT MCARE 7/17/2024 Nataila Barrios OTR GO 1                      ERA Stoner  7/17/2024

## 2024-07-17 NOTE — THERAPY TREATMENT NOTE
Outpatient Physical Therapy Ortho Treatment Note  [facility]     Patient Name: Jocelyn Ríos  : 1970  MRN: [id number]  Today's Date: 2024      Visit Date: 2024    Visit Dx:    ICD-10-CM ICD-9-CM   1. Neck pain  M54.2 723.1   2. Left knee pain, unspecified chronicity  M25.562 719.46       Patient Active Problem List   Diagnosis    Anxiety    Moderate persistent asthma without complication    Environmental allergies    Routine adult health maintenance    Complex regional pain syndrome i of left lower limb    Carpal tunnel syndrome on both sides    Status post arthroscopic surgery of left knee, DOS 10/21/2019    Intractable neuropathic pain of left knee    Chronic cervical pain    Chondromalacia of knee, left    Screening mammogram for breast cancer    Moderate mixed hyperlipidemia not requiring statin therapy        Past Medical History:   Diagnosis Date    Acute sinus infection     on poab    Anxiety     Arthritis     Asthma     STARTED @ 41 YRS OLD, DR. BUENO, & [facility] ALLERGY DR. LOPEZ    COVID-19     2021    CRPS (complex regional pain syndrome) type I of lower limb     Elevated cholesterol     Frequent UTI     GERD (gastroesophageal reflux disease)     History of 2019 novel coronavirus disease (COVID-19)     Iliotibial band syndrome affecting left lower leg     Itching     BLE has some scratches to notify MD if doesn't clear up prior to surgery    Kidney stones     Lateral collateral ligament deficiency of left knee 2016    Lateral meniscal tear     Lumbar paraspinal muscle spasm 2017    Meniscal cyst, left 2019    Migraines     Patellofemoral pain syndrome of left knee 2019    PONV (postoperative nausea and vomiting)     Seasonal allergies     Subchondral insufficiency fracture of femoral condyle, left, initial encounter 01/10/2020    Tear of lateral meniscus of left knee, current 2019        Past Surgical History:   Procedure  "Laterality Date     SECTION      x2    DIAGNOSTIC LAPAROSCOPY      FOOT SURGERY Right     HERNIA REPAIR Bilateral     inguinal hernia age 6    INTRAUTERINE DEVICE INSERTION      KNEE ARTHROSCOPY Left 10/21/2016    Procedure: KNEE ARTHROSCOPY debridement of lateral meniscal tear;  Surgeon: Son Mojica MD;  Location:  [facility];  Service:     KNEE ARTHROSCOPY Left 2018    Procedure: KNEE ARTHROSCOPY;  Surgeon: Son Mojica MD;  Location:  [facility];  Service: Orthopedics    KNEE ARTHROSCOPY Left 2021    Procedure: LEFT KNEE DIAGNOSTIC  ARTHROSCOPY AND PARTLATERAL MENISECTOMY AND REMOVAL OF MULTIPLE FIXATION DEVICES;  Surgeon: Rodríguez Hamlin MD;  Location:  [facility];  Service: Orthopedics;  Laterality: Left;    KNEE MENISCAL REPAIR Left 10/21/2019    Procedure: knee arthroscopy, debridement of meniscal cyst, and lateral meniscal repair;  Surgeon: Bridger Figueroa MD;  Location:  [facility];  Service: Orthopedics    NERVE BLOCK Left 2021    Procedure: KNEE GENICULAR NERVE BLOCK UNDER FLUORO- x2, 3-4 wks apart;  Surgeon: James Spivey MD;  Location: [facility];  Service: Pain Management;  Laterality: Left;        PT Ortho       Row Name 24 1400       Subjective    Subjective Comments pt states she had follow up with MD who states she can finish therapy and \"she will be fine\" by the time she goes back to work; pt reports unexplained \"burning\" sensation (L) knee into distal thigh last night but better today; continues to feel better after traction but then as soon as she starts to \"lift or pull\" anything, the pain returns  -              User Key  (r) = Recorded By, (t) = Taken By, (c) = Cosigned By      Initials Name Provider Type     Alec Ribera, PTA Physical Therapist Assistant                                 PT Assessment/Plan       Row Name 24 0561          PT Assessment    Assessment Comments pt continues to gain temporary relief with traction and is " "tolerating HEP well but reports neck symptoms return with any increase in activity throughout the day; knee symptoms are improved in general but do vary - using knee sleeve with relief for walking long distances  -        PT Plan    PT Plan Comments Will continue per MD, pending insurance approval  -               User Key  (r) = Recorded By, (t) = Taken By, (c) = Cosigned By      Initials Name Provider Type     Alec Ribera PTA Physical Therapist Assistant                     Modalities       Row Name 07/17/24 1400             Moist Heat    Location Cervical spine & L knee (anterior and posterior) with pt supine in hooklying.  -      PT Moist Heat Minutes 12  -      MH S/P Rx Yes  after exercises and before ICTX  -         Traction 14985    Traction Type Cervical  with  across shoulders  -      PT Traction Rx Minutes 15  -      Position Hook-lying  -      Weight 14  Pull decreased this session due only to difference between traction units, not because pt did not tolerate  -      Hold 60  -      Relax 20  -                User Key  (r) = Recorded By, (t) = Taken By, (c) = Cosigned By      Initials Name Provider Type     Alec Ribera PTA Physical Therapist Assistant                   OP Exercises       Row Name 07/17/24 1512 07/17/24 1400          Precautions    Existing Precautions/Restrictions -- no known precautions/restrictions  -        Subjective    Subjective Comments -- pt states she had follow up with MD who states she can finish therapy and \"she will be fine\" by the time she goes back to work; pt reports unexplained \"burning\" sensation (L) knee into distal thigh last night but better today; continues to feel better after traction but then as soon as she starts to \"lift or pull\" anything, the pain returns  -        Total Minutes    16242 - PT Therapeutic Exercise Minutes 30  -MH --        Exercise 1    Exercise Name 1 -- QS over single towel roll  -     Reps 1 -- HEP "  -MH        Exercise 2    Exercise Name 2 -- SAQ ball squeeze  -MH     Cueing 2 -- Verbal;Tactile;Demo  -MH     Reps 2 -- 35  -MH     Time 2 -- 3-5 sec  -MH        Exercise 3    Exercise Name 3 -- SLR  -MH     Cueing 3 -- Verbal;Tactile;Demo  -MH     Reps 3 -- 35  -MH     Time 3 -- 1.5#  -MH        Exercise 4    Exercise Name 4 -- sidelying hip abduction  -MH     Cueing 4 -- Verbal;Tactile;Demo  -MH     Reps 4 -- 35  -MH     Time 4 -- 1.5#  -MH        Exercise 5    Exercise Name 5 -- sidelying hip adduction  -MH     Cueing 5 -- Verbal;Tactile;Demo  -MH     Reps 5 -- 35  -MH     Time 5 -- 1.5#  -MH        Exercise 6    Exercise Name 6 -- Active cervical SB/UT stretch  -        Exercise 7    Exercise Name 7 -- Levator scapula stretch  -        Exercise 8    Exercise Name 8 -- Scapular retraction  -        Exercise 9    Exercise Name 9 -- hooklying ball squeeze  -MH     Cueing 9 -- Verbal;Tactile;Demo  -MH     Reps 9 -- 35  -MH     Time 9 -- 5 sec  -MH        Exercise 10    Exercise Name 10 -- heel raises  -MH     Cueing 10 -- Verbal  -MH     Reps 10 -- 35  -MH        Exercise 11    Exercise Name 11 -- TKE vs TB  -MH     Cueing 11 -- Verbal;Demo  -MH     Reps 11 -- 35  -MH     Time 11 -- silver  -MH        Exercise 12    Exercise Name 12 -- Prone leg raises  -MH     Cueing 12 -- Verbal;Tactile;Demo  -MH     Reps 12 -- 35  -MH     Time 12 -- 1.5#  -        Exercise 13    Exercise Name 13 -- Bridge with ball  -MH     Cueing 13 -- Verbal;Tactile;Demo  -MH     Reps 13 -- 35  -MH     Time 13 -- 5 sec  -MH        Exercise 14    Exercise Name 14 -- corner stretch  -     Sets 14 -- HEP  -               User Key  (r) = Recorded By, (t) = Taken By, (c) = Cosigned By      Initials Name Provider Type    Alec Leonard, PTA Physical Therapist Assistant                                     Therapy Education  Given: HEP, Symptoms/condition management  Program: Reinforced  How Provided: Verbal  Provided to:  Patient  Level of Understanding: Teach back education performed, Verbalized, Demonstrated              Time Calculation:   Start Time: 1403  Stop Time: 1512  Time Calculation (min): 69 min  Timed Charges  45025 - PT Therapeutic Exercise Minutes: 30  Untimed Charges  PT Traction Rx Minutes: 15  PT Moist Heat Minutes: 12  Total Minutes  Timed Charges Total Minutes: 30  Untimed Charges Total Minutes: 27   Total Minutes: 30  Therapy Charges for Today       Code Description Service Date Service Provider Modifiers Qty    94692820441 HC PT THER PROC EA 15 MIN 7/17/2024 Alec Ribera PTA GP 2    85712612702 HC PT TRACTION CERVICAL 7/17/2024 Alec Ribera PTA GP 1                      Alec Ribera PTA  7/17/2024

## 2024-07-26 ENCOUNTER — HOSPITAL ENCOUNTER (OUTPATIENT)
Dept: OCCUPATIONAL THERAPY | Facility: HOSPITAL | Age: 54
Setting detail: THERAPIES SERIES
Discharge: HOME | End: 2024-07-26
Payer: OTHER MISCELLANEOUS

## 2024-07-26 PROCEDURE — 97530 THERAPEUTIC ACTIVITIES: CPT

## 2024-07-26 NOTE — THERAPY TREATMENT NOTE
Outpatient Occupational Therapy Ortho Treatment Note  [facility]EXAND[facility]     Patient Name: Jocelyn Ríos  : 1970  MRN: [id number]  Today's Date: 2024        Visit Date: 2024    Patient Active Problem List   Diagnosis    Anxiety    Moderate persistent asthma without complication    Environmental allergies    Routine adult health maintenance    Complex regional pain syndrome i of left lower limb    Carpal tunnel syndrome on both sides    Status post arthroscopic surgery of left knee, DOS 10/21/2019    Intractable neuropathic pain of left knee    Chronic cervical pain    Chondromalacia of knee, left    Screening mammogram for breast cancer    Moderate mixed hyperlipidemia not requiring statin therapy        Past Medical History:   Diagnosis Date    Acute sinus infection     on poab    Anxiety     Arthritis     Asthma     STARTED @ 41 YRS OLD, DR. BUENO, & \[facility]\"" ALLERGY DR. LOPEZ    COVID-19     2021    CRPS (complex regional pain syndrome) type I of lower limb     Elevated cholesterol     Frequent UTI     GERD (gastroesophageal reflux disease)     History of 2019 novel coronavirus disease (COVID-19)     Iliotibial band syndrome affecting left lower leg     Itching     BLE has some scratches to notify MD if doesn't clear up prior to surgery    Kidney stones     Lateral collateral ligament deficiency of left knee 2016    Lateral meniscal tear     Lumbar paraspinal muscle spasm 2017    Meniscal cyst, left 2019    Migraines     Patellofemoral pain syndrome of left knee 2019    PONV (postoperative nausea and vomiting)     Seasonal allergies     Subchondral insufficiency fracture of femoral condyle, left, initial encounter 01/10/2020    Tear of lateral meniscus of left knee, current 2019        Past Surgical History:   Procedure Laterality Date     SECTION      x2    DIAGNOSTIC LAPAROSCOPY      FOOT SURGERY Right     HERNIA REPAIR Bilateral      inguinal hernia age 6    INTRAUTERINE DEVICE INSERTION      KNEE ARTHROSCOPY Left 10/21/2016    Procedure: KNEE ARTHROSCOPY debridement of lateral meniscal tear;  Surgeon: Son Mojica MD;  Location:  [facility];  Service:     KNEE ARTHROSCOPY Left 7/27/2018    Procedure: KNEE ARTHROSCOPY;  Surgeon: Son Mojica MD;  Location:  [facility];  Service: Orthopedics    KNEE ARTHROSCOPY Left 12/2/2021    Procedure: LEFT KNEE DIAGNOSTIC  ARTHROSCOPY AND PARTLATERAL MENISECTOMY AND REMOVAL OF MULTIPLE FIXATION DEVICES;  Surgeon: Rodríguez Hamlin MD;  Location:  [facility];  Service: Orthopedics;  Laterality: Left;    KNEE MENISCAL REPAIR Left 10/21/2019    Procedure: knee arthroscopy, debridement of meniscal cyst, and lateral meniscal repair;  Surgeon: Bridger Figueroa MD;  Location:  [facility];  Service: Orthopedics    NERVE BLOCK Left 5/25/2021    Procedure: KNEE GENICULAR NERVE BLOCK UNDER FLUORO- x2, 3-4 wks apart;  Surgeon: James Spivey MD;  Location: [facility];  Service: Pain Management;  Laterality: Left;         Visit Dx:  No diagnosis found.     OT Ortho       Row Name 07/26/24 1100             Subjective Comments    Subjective Comments Patient reports she had an MRI. Reports she doesn't know the results yet and has to make an appointment with Dr. Rodriguez.  -EN         Subjective Pain    Able to rate subjective pain? yes  -EN      Pre-Treatment Pain Level 0  -EN      Subjective Pain Comment Pain mainly with use, up to a 5/10.  -EN                User Key  (r) = Recorded By, (t) = Taken By, (c) = Cosigned By      Initials Name Provider Type    EN Natalia Barrios OTR Occupational Therapist                                  OT Assessment/Plan       Row Name 07/26/24 1421          OT Assessment    Functional Limitations Limitation in home management;Performance in leisure activities;Performance in self-care ADL;Performance in work activities  -EN     Impairments Edema;Muscle strength;Pain;Sensation  -EN      Assessment Comments Patient reports she had a MRI however has not gotten results. States she has to call the doctor and schedule an appointment. Patient reports her wrist continues to ache ulnar side (TFCC area)  and first dorsal compartment/thenar area. Patient reports pain has reached 5/10 with certain activities, especially with weight bearing. Patient's  strength up slightly today. Reinforced HEP and edema management.  -EN     OT Diagnosis L wrist and hand pain, decreased strength, decreased ADL/IADL independence  -EN     OT Rehab Potential Good  -EN        OT Plan    Planned Therapy Interventions (Optional Details) patient/family education;ROM (Range of Motion);strengthening;home exercise program  -EN     OT Plan Comments Continue with goals.  -EN               User Key  (r) = Recorded By, (t) = Taken By, (c) = Cosigned By      Initials Name Provider Type    Natalia Bettencourt, OTR Occupational Therapist                      OT Goals       Row Name 07/26/24 1400          OT Short Term Goals    STG Date to Achieve 07/16/24  -EN     STG 1 Patient will demonstrate independence with HEP/edema management.  -EN     STG 1 Progress Met  -EN     STG 2 Patient will report decreased L wrist/hand pain with use by 50%.  -EN     STG 2 Progress Met  -EN     STG 3 Patient will demonstrate improved left  strength 5# for improved grasp of objects.  -EN     STG 3 Progress Met  -EN     STG 4 Patient will report decreased wrist pain with activity by 50% (currently 4/10).  -EN     STG 4 Progress Ongoing  -EN        Long Term Goals    LTG Date to Achieve 07/30/24  -EN     LTG 1 Patient will demonstrate 5/5 wrist/forearm strength for improved ADL/IADL independence.  -EN     LTG 1 Progress Met  -EN     LTG 2 Patient will demonstrate improved L  strength by 15# or more for improved grasp of objects.  -EN     LTG 2 Progress Met  -EN     LTG 3 Improved Quick Dash 40 points or more.  -EN     LTG 3 Progress Ongoing  -EN      LTG 4 Patient will report decreased pain with use by 75%.  -EN     LTG 4 Progress Ongoing  -EN               User Key  (r) = Recorded By, (t) = Taken By, (c) = Cosigned By      Initials Name Provider Type    Natalia Bettencourt OTR Occupational Therapist                       OT Exercises       Row Name 07/26/24 1100             Exercise 1    Exercise Name 1 retrograde edema massage left hand/wrist  -EN         Exercise 2    Exercise Name 2 ROM exercises wrist and thumb  -EN         Exercise 3    Exercise Name 3 wrist strengthening  -EN      Cueing 3 Verbal;Tactile;Demo  -EN      Equipment 3 Dumbell  -EN      Weights/Plates 3 5  -EN      Sets 3 2  -EN      Reps 3 15  -EN         Exercise 4    Exercise Name 4 wrist/forearm strengthening with therabar (supination, pronation, flexion and extension  -EN      Cueing 4 Verbal;Tactile;Demo  -EN      Equipment 4 Other  -EN      Resistance 4 Green  -EN      Sets 4 1  -EN      Reps 4 20  -EN         Exercise 5    Exercise Name 5 hand strengthening with blue digiflex (hold for 3 seconds X 15 reps) and hand gripper 35 pounds (hold 3 seconds X 15 reps)  -EN                User Key  (r) = Recorded By, (t) = Taken By, (c) = Cosigned By      Initials Name Provider Type    Natalia Bettencourt OTR Occupational Therapist                                  Time Calculation:   OT Start Time: 1100  OT Stop Time: 1150  OT Time Calculation (min): 50 min  Timed Charges  52556 - OT Therapeutic Activity Minutes: 30  Total Minutes  Timed Charges Total Minutes: 30   Total Minutes: 30     Therapy Charges for Today       Code Description Service Date Service Provider Modifiers Qty    26010702164  OT THERAPEUTIC ACT EA 15 MIN 7/26/2024 Natalia Barrios OTR GO 2    [id number]  OT HOT OR COLD PACK TREAT MCARE 7/26/2024 Natalia Barrios OTR GO 1                      ERA Stoner  7/26/2024

## 2024-07-31 ENCOUNTER — APPOINTMENT (OUTPATIENT)
Dept: OCCUPATIONAL THERAPY | Facility: HOSPITAL | Age: 54
End: 2024-07-31
Payer: OTHER MISCELLANEOUS

## 2024-07-31 ENCOUNTER — APPOINTMENT (OUTPATIENT)
Dept: PHYSICAL THERAPY | Facility: HOSPITAL | Age: 54
End: 2024-07-31
Payer: OTHER MISCELLANEOUS

## 2024-08-05 ENCOUNTER — HOSPITAL ENCOUNTER (OUTPATIENT)
Dept: OCCUPATIONAL THERAPY | Facility: HOSPITAL | Age: 54
Setting detail: THERAPIES SERIES
Discharge: HOME | End: 2024-08-05
Payer: OTHER MISCELLANEOUS

## 2024-08-05 ENCOUNTER — HOSPITAL ENCOUNTER (OUTPATIENT)
Dept: PHYSICAL THERAPY | Facility: HOSPITAL | Age: 54
Setting detail: THERAPIES SERIES
Discharge: HOME | End: 2024-08-05
Payer: OTHER MISCELLANEOUS

## 2024-08-05 DIAGNOSIS — M25.532 LEFT WRIST PAIN: ICD-10-CM

## 2024-08-05 DIAGNOSIS — M25.562 LEFT KNEE PAIN, UNSPECIFIED CHRONICITY: ICD-10-CM

## 2024-08-05 DIAGNOSIS — M79.642 LEFT HAND PAIN: Primary | ICD-10-CM

## 2024-08-05 DIAGNOSIS — M54.2 NECK PAIN: Primary | ICD-10-CM

## 2024-08-05 PROCEDURE — 97110 THERAPEUTIC EXERCISES: CPT

## 2024-08-05 PROCEDURE — 97012 MECHANICAL TRACTION THERAPY: CPT

## 2024-08-05 NOTE — THERAPY RE-EVALUATION
Outpatient Physical Therapy Ortho Re-Evaluation  ALEXANDER [facility]en[facility]     Patient Name: Jocelyn Ríos  : 1970  MRN: [id number]  Today's Date: 2024          Visit Date: 2024      Patient Active Problem List   Diagnosis    Anxiety    Moderate persistent asthma without complication    Environmental allergies    Routine adult health maintenance    Complex regional pain syndrome i of left lower limb    Carpal tunnel syndrome on both sides    Status post arthroscopic surgery of left knee, DOS 10/21/2019    Intractable neuropathic pain of left knee    Chronic cervical pain    Chondromalacia of knee, left    Screening mammogram for breast cancer    Moderate mixed hyperlipidemia not requiring statin therapy        Past Medical History:   Diagnosis Date    Acute sinus infection     on poab    Anxiety     Arthritis     Asthma     STARTED @ 41 YRS OLD, DR. BUENO, & [facility] ALLERGY DR. LOPEZ    COVID-19     2021    CRPS (complex regional pain syndrome) type I of lower limb     Elevated cholesterol     Frequent UTI     GERD (gastroesophageal reflux disease)     History of 2019 novel coronavirus disease (COVID-19)     Iliotibial band syndrome affecting left lower leg     Itching     BLE has some scratches to notify MD if doesn't clear up prior to surgery    Kidney stones     Lateral collateral ligament deficiency of left knee 2016    Lateral meniscal tear     Lumbar paraspinal muscle spasm 2017    Meniscal cyst, left 2019    Migraines     Patellofemoral pain syndrome of left knee 2019    PONV (postoperative nausea and vomiting)     Seasonal allergies     Subchondral insufficiency fracture of femoral condyle, left, initial encounter 01/10/2020    Tear of lateral meniscus of left knee, current 2019        Past Surgical History:   Procedure Laterality Date     SECTION      x2    DIAGNOSTIC LAPAROSCOPY      FOOT SURGERY Right     HERNIA REPAIR Bilateral      inguinal hernia age 6    INTRAUTERINE DEVICE INSERTION      KNEE ARTHROSCOPY Left 10/21/2016    Procedure: KNEE ARTHROSCOPY debridement of lateral meniscal tear;  Surgeon: Son Mojica MD;  Location:  [facility];  Service:     KNEE ARTHROSCOPY Left 7/27/2018    Procedure: KNEE ARTHROSCOPY;  Surgeon: Son Mojica MD;  Location:  [facility];  Service: Orthopedics    KNEE ARTHROSCOPY Left 12/2/2021    Procedure: LEFT KNEE DIAGNOSTIC  ARTHROSCOPY AND PARTLATERAL MENISECTOMY AND REMOVAL OF MULTIPLE FIXATION DEVICES;  Surgeon: Rodríguez Hamlin MD;  Location:  [facility];  Service: Orthopedics;  Laterality: Left;    KNEE MENISCAL REPAIR Left 10/21/2019    Procedure: knee arthroscopy, debridement of meniscal cyst, and lateral meniscal repair;  Surgeon: Bridger Figueroa MD;  Location:  [facility];  Service: Orthopedics    NERVE BLOCK Left 5/25/2021    Procedure: KNEE GENICULAR NERVE BLOCK UNDER FLUORO- x2, 3-4 wks apart;  Surgeon: James Spivey MD;  Location: [facility] MAIN OR;  Service: Pain Management;  Laterality: Left;       Visit Dx:     ICD-10-CM ICD-9-CM   1. Neck pain  M54.2 723.1   2. Left knee pain, unspecified chronicity  M25.562 719.46              PT Ortho       Row Name 08/05/24 1000       MMT (Manual Muscle Testing)    General MMT Comments B shoulder strength 5/5.  -LN       MMT Left Lower Ext    Lt Hip Flexion MMT, Gross Movement (5/5) normal  -LN    Lt Hip Extension MMT, Gross Movement (5/5) normal  -LN    Lt Hip ABduction MMT, Gross Movement (5/5) normal  -LN    Lt Hip ADduction MMT, Gross Movement (5/5) normal  -LN    Lt Knee Extension MMT, Gross Movement (5/5) normal  -LN    Lt Knee Flexion MMT, Gross Movement (5/5) normal  -LN    Lt Ankle Plantarflexion MMT, Gross Movement (5/5) normal  -LN    Lt Ankle Dorsiflexion MMT, Gross Movement (5/5) normal  -LN       Sensation    Sensation WNL? WNL  -LN    Light Touch No apparent deficits  -LN       Upper Extremity Flexibility    Suboccipitals Bilateral:;Mildly  "limited  -LN    Scalenes Bilateral:;Mildly limited  -LN    Upper Trapezius Bilateral:;Mildly limited;Moderately limited  -LN    Levator Scapula Bilateral:;Mildly limited;Moderately limited  -LN    Pect Minor Bilateral:;Mildly limited  -LN       Lower Extremity Flexibility    Hamstrings Left:;Mildly limited  -LN    ITB Left:;Mildly limited  -LN    Gastrocnemius Left:;Mildly limited  -LN    Soleus Left:;Mildly limited  -LN       LLE Quick Girth (cm)    Met-heads --  No significant edema noted in L knee today.  -LN       Gait/Stairs (Locomotion)    [place] Level (Gait) independent  -LN    Assistive Device (Gait) other (see comments)  no AD used  -LN    Comment, (Gait/Stairs) Patient ambulates with nominal to minimal antalgic gait L. No AD used.  -LN      Row Name 08/05/24 0900       Subjective    Subjective Comments Pt reports that her neck and knee \"are sore.\"  \"I am better since PT began.\" \"I just have a constant discomfort.\" \"The traction still helps me a lot.\" \"I feel like my knee is always going to bother me but it is my neck pain that concerns me.\" Pt reports doing well with HEP.  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  -LN       Precautions and Contraindications    Precautions/Limitations no known precautions/limitations  -LN       Subjective Pain    Able to rate subjective pain? yes  -LN    Pre-Treatment Pain Level 5  neck- R side of neck and into shoulder and into arm; sometimes is on L side, \"but today it is the right side.\"  -LN    Subjective Pain Comment L knee (medial)= 3/10  -LN       Cervical Palpation    Suboccipital Bilateral:;Tender;Guarded/taut  -LN    Scalenes Bilateral:;Tender;Guarded/taut  -LN    Levator Scapula Bilateral:;Tender;Guarded/taut  -LN    Upper Traps Bilateral:;Tender;Swollen  -LN    Middle Traps Bilateral:;Tender;Guarded/taut  -LN    Rhomboids " Bilateral:;Tender;Guarded/taut  -LN       Knee Palpation    Patella Left:;Tender  -LN    Medial Joint Line Left:;Tender  -LN       Patellar Accessory Motions    Superior glide Left:;WNL  -LN    Inferior glide Left:;WNL  -LN    Medial glide Left:;WNL  -LN    Lateral glide Left:;WNL  -LN       General ROM    GENERAL ROM COMMENTS B shoulder AROM WFL-WNL all planes  -LN       Head/Neck/Trunk    Neck Extension AROM WNL- a little discomfort R side of neck  -LN    Neck Flexion AROM WNL- a little discomfort posterior neck  -LN    Neck Lt Lateral Flexion AROM 50%- pain and tightness  -LN    Neck Rt Lateral Flexion AROM 50%- pain and tightness  -LN    Neck Lt Rotation AROM 75%  -LN    Neck Rt Rotation AROM 75%  -LN       Left Lower Ext    Lt Knee Extension/Flexion AROM 0-135 degrees painfree  -LN              User Key  (r) = Recorded By, (t) = Taken By, (c) = Cosigned By      Initials Name Provider Type    Bren Rodriguez, PT Physical Therapist                                Therapy Education  Education Details: Pt to continue with HEP daily and use MH/CP PRN.  Given: HEP, Symptoms/condition management, Posture/body mechanics  Program: Reinforced  How Provided: Verbal, Demonstration  Provided to: Patient  Level of Understanding: Teach back education performed, Verbalized, Demonstrated          PT OP Goals       Row Name 08/05/24 0900          PT Short Term Goals    STG Date to Achieve --  all STG met  -LN     STG 1 Pt to verbally report decreased neck pain to <6/10 with ADLs and everyday activities.  -LN     STG 1 Progress Met  -LN     STG 2 Pt to verbally report decreased L knee pain to <6/10 with ADLs and everyday activities.  -LN     STG 2 Progress Met  -LN     STG 3 Pt independent with initial HEP issued by therapist.  -LN     STG 3 Progress Met  -LN     STG 4 CROM improved by 25% to allow her to turn her neck better with driving.  -LN     STG 4 Progress Met  -LN     STG 5 Muscle guarding decreased to minimal B  UT/levator/MT/rhomboids/scalenes.  -LN     STG 5 Progress Met  -LN     STG 6 Posterior cervical HA decreased by 25%-50%.  -LN     STG 6 Progress Met  -LN     STG 6 Progress Comments No longer getting posterior HA.  -LN     STG 7 Episodes of B Hand N/T decreased by 25%-50%.  -LN     STG 7 Progress Met  -LN     STG 7 Progress Comments No longer getting N/T in hands  -LN        Long Term Goals    LTG Date to Achieve 09/02/24  -LN     LTG 1 Pt to verbally report decreased neck pain to <3/10 with ADLs and everyday activities.  -LN     LTG 1 Progress Ongoing;Progressing  -LN     LTG 1 Progress Comments Pt rated neck pain at 5/10 today.  -LN     LTG 2 Pt to verbally report decreased L knee pain to <3/10 with ADLs and everyday activities.  -LN     LTG 2 Progress Ongoing;Progressing  -LN     LTG 2 Progress Comments Pt rated L knee pain at 3/10 today.  -LN     LTG 3 CROM WFL & painfree all planes.  -LN     LTG 3 Progress Partially Met;Ongoing;Progressing  -LN     LTG 3 Progress Comments cervical flexion and extension are now WNL but still with a little discomfort reported.  -LN     LTG 4 L hip and knee strength improved to 5/5.  -LN     LTG 4 Progress Met  -LN     LTG 4 Progress Comments all planes are now 5/5  -LN     LTG 5 No limp noted with ambulation.  -LN     LTG 5 Progress Partially Met;Ongoing;Progressing  -LN     LTG 5 Progress Comments Only nominal limp noted today.  -LN     LTG 6 Muscle guarding decreased to nominal B UT/levator/MT/rhomboids/scalenes.  -LN     LTG 6 Progress Ongoing;Progressing  -LN     LTG 6 Progress Comments MM guarding does still persist but now decreased to minimal.  -LN     LTG 7 Pt to no longer report any N/T in hands.  -LN     LTG 7 Progress Met  -LN     LTG 8 Posterior cervical HA decreased by 50%-75%.  -LN     LTG 8 Progress Met  -LN     LTG 9 Improved mm girth by 2-3 cm in L Quads and by 0.5cm in L calf.  -LN     LTG 9 Progress Met  -LN        Time Calculation    PT Goal Re-Cert Due Date  09/02/24  -LN               User Key  (r) = Recorded By, (t) = Taken By, (c) = Cosigned By      Initials Name Provider Type    Bren Rodriguez, PT Physical Therapist                     PT Assessment/Plan       Row Name 08/05/24 1000          PT Assessment    Assessment Comments Pt has progressed fairly well with PT with overall decreased neck pain and L knee pain but still has limited walking tolerance due to L knee pain and limited CROM due to pain and tightness.  Pt with overall improved CROM but is still limited lefty in B lateral flexion. Pt still with mm guarding palpated in B suboccipital, levator, cervical PS, UT, MT but is decreased to minimal now. She no longer reports any N/T in hands or any posterior HA. She did c/o pain in R side of neck and into shoulder/upper arm today; decreased after ICTX. She continues to report good benefit from ICTX. L knee and hip strength is now 5/5 & L knee AROM is now WNL & painfree. She still has some minimal tenderness medial left knee and patella. She is independent with HEP. Pt has met all STG and she has met 4 out of 9 LTG and partially met 2 LTG. She is making good progress towards all remaining LTG. 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  -LN        PT Plan    PT Frequency 1x/week  -LN     Predicted Duration of Therapy Intervention (PT) for 3 more visits (she has 3 more visits approved by worker's comp). See below.  -LN     PT Plan Comments Continue 1 x week for 3 more weeks (she has 3 more visits approved by worker's comp at this time). Plan on discharge with HEP after 3 more visits for L knee, but may need to get more visits for neck pain; will continue to assess. Progress with exercises as  "tolerated; continue modalities PRN (MH); Continue with ICTX and increase weight as indicated/tolerated. Pt and posture education. Manual therapy/STM/MFR as needed. Continue towards all remaining LTG.  -LN               User Key  (r) = Recorded By, (t) = Taken By, (c) = Cosigned By      Initials Name Provider Type    Bren Rodriguez, PT Physical Therapist                     Modalities       Row Name 08/05/24 0900             Precautions    Existing Precautions/Restrictions no known precautions/restrictions  -LN         Moist Heat    MH Applied Yes  -LN      Location Cervical spine & L knee (anterior and posterior) with pt supine in hooklying.  -LN      PT Moist Heat Minutes 12  -LN      MH S/P Rx Yes  after exercises and before ICTX  -LN         Traction 66528    Traction Type Cervical  with MH across shoulders  -LN      PT Traction Rx Minutes 15  -LN      Duration Intermittent  -LN      Position Hook-lying  -LN      Weight 15  -LN      Hold 60  -LN      Relax 20  -LN                User Key  (r) = Recorded By, (t) = Taken By, (c) = Cosigned By      Initials Name Provider Type    Bren Rodriguez, PT Physical Therapist                   OP Exercises       Row Name 08/05/24 1000 08/05/24 0900          Precautions    Existing Precautions/Restrictions -- no known precautions/restrictions  -LN        Subjective    Subjective Comments -- Pt reports that her neck and knee \"are sore.\"  \"I am better since PT began.\" \"I just have a constant discomfort.\" \"The traction still helps me a lot.\" \"I feel like my knee is always going to bother me but it is my neck pain that concerns me.\" Pt reports doing well with HEP.  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  -LN        Subjective Pain    Able to rate subjective pain? -- yes  -LN     Pre-Treatment Pain Level -- 5  neck- R side of neck and " "into shoulder and into arm; sometimes is on L side, \"but today it is the right side.\"  -LN     Subjective Pain Comment -- L knee (medial)= 3/10  -LN        Total Minutes    80179 - PT Therapeutic Exercise Minutes 25  -LN --        Exercise 1    Exercise Name 1 QS over single towel roll  -LN --     Reps 1 HEP  -LN --        Exercise 2    Exercise Name 2 SAQ ball squeeze  -LN --     Cueing 2 Verbal;Tactile;Demo  -LN --     Reps 2 45  -LN --     Time 2 3-5 sec  -LN --        Exercise 3    Exercise Name 3 SLR  -LN --     Cueing 3 Verbal;Tactile;Demo  -LN --     Reps 3 45  -LN --     Time 3 1.5#  -LN --        Exercise 4    Exercise Name 4 sidelying hip abduction  -LN --     Cueing 4 Verbal;Tactile;Demo  -LN --     Reps 4 45  -LN --     Time 4 1.5#  -LN --        Exercise 5    Exercise Name 5 sidelying hip adduction  -LN --     Cueing 5 Verbal;Tactile;Demo  -LN --     Reps 5 35  -LN --     Time 5 1.5#  -LN --        Exercise 6    Exercise Name 6 Active cervical SB/UT stretch  -LN --        Exercise 7    Exercise Name 7 Levator scapula stretch  -LN --        Exercise 8    Exercise Name 8 Scapular retraction  -LN --        Exercise 9    Exercise Name 9 hooklying ball squeeze  -LN --     Cueing 9 Verbal;Tactile;Demo  -LN --     Reps 9 45  -LN --     Time 9 5 sec  -LN --        Exercise 10    Exercise Name 10 heel raises  -LN --     Cueing 10 Verbal  -LN --     Reps 10 --  -LN --     Additional Comments HEP  -LN --        Exercise 11    Exercise Name 11 TKE vs TB  -LN --     Cueing 11 Verbal;Demo  -LN --     Reps 11 --  -LN --     Time 11 --  -LN --     Additional Comments HEP  -LN --        Exercise 12    Exercise Name 12 Prone leg raises  -LN --     Cueing 12 Verbal;Tactile;Demo  -LN --     Reps 12 45  -LN --     Time 12 1.5#  -LN --        Exercise 13    Exercise Name 13 Bridge with ball  -LN --     Cueing 13 Verbal;Tactile;Demo  -LN --     Reps 13 45  -LN --     Time 13 5 sec  -LN --        Exercise 14    Exercise Name " 14 corner stretch  -LN --     Sets 14 HEP  -LN --               User Key  (r) = Recorded By, (t) = Taken By, (c) = Cosigned By      Initials Name Provider Type    Bren Rodriguez, PT Physical Therapist                                            Time Calculation:     Start Time: 0955  Stop Time: 1115  Time Calculation (min): 80 min  Timed Charges  24550 - PT Therapeutic Exercise Minutes: 25  Untimed Charges  PT Traction Rx Minutes: 15  PT Moist Heat Minutes: 12  Total Minutes  Timed Charges Total Minutes: 25  Untimed Charges Total Minutes: 27   Total Minutes: 25     Therapy Charges for Today       Code Description Service Date Service Provider Modifiers Qty    25992311811 HC PT TRACTION CERVICAL 8/5/2024 Bren Shepherd, PT GP 1    14840808079 HC PT THER PROC EA 15 MIN 8/5/2024 Bren Shepherd, PT GP 2                      Bren Shepherd, PT  8/5/2024

## 2024-08-05 NOTE — SIGNIFICANT NOTE
Patient reports she was seen by Dr. Rodriguez and he has referred her to a hand specialist. MRI results requested from Dr Rodriguez's office revealed:    Dorsal wrist capsulitis and/or capsular sprain  Intact TFC and intrinsic ligaments  Pisotriquetral capsulosynovial cyst. Subtle debris within the cyst lumen.  Flat cyst dorsal capsule. Differential considerations include both ganglion and capsulosynovial cyst.    Advised patient to stop strengthening exercises, continue with edema management and ROM. Patient has edema glove/tubigrip and brace to wear for comfort until seen by hand specialist. Patient will hold on OT until seen by hand specialist.

## 2024-08-12 ENCOUNTER — APPOINTMENT (OUTPATIENT)
Dept: PHYSICAL THERAPY | Facility: HOSPITAL | Age: 54
End: 2024-08-12
Payer: OTHER MISCELLANEOUS

## 2024-08-20 ENCOUNTER — HOSPITAL ENCOUNTER (OUTPATIENT)
Dept: PHYSICAL THERAPY | Facility: HOSPITAL | Age: 54
Setting detail: THERAPIES SERIES
Discharge: HOME | End: 2024-08-20
Payer: OTHER MISCELLANEOUS

## 2024-08-20 DIAGNOSIS — M54.2 NECK PAIN: Primary | ICD-10-CM

## 2024-08-20 DIAGNOSIS — M25.562 LEFT KNEE PAIN, UNSPECIFIED CHRONICITY: ICD-10-CM

## 2024-08-20 PROCEDURE — 97110 THERAPEUTIC EXERCISES: CPT

## 2024-08-20 PROCEDURE — 97012 MECHANICAL TRACTION THERAPY: CPT

## 2024-08-20 NOTE — THERAPY TREATMENT NOTE
Outpatient Physical Therapy Ortho Treatment Note  [facility]EXAND[facility]     Patient Name: Jocelyn Ríos  : 1970  MRN: [id number]  Today's Date: 2024        Visit Date: 2024    Visit Dx:    ICD-10-CM ICD-9-CM   1. Neck pain  M54.2 723.1   2. Left knee pain, unspecified chronicity  M25.562 719.46       Patient Active Problem List   Diagnosis    Anxiety    Moderate persistent asthma without complication    Environmental allergies    Routine adult health maintenance    Complex regional pain syndrome i of left lower limb    Carpal tunnel syndrome on both sides    Status post arthroscopic surgery of left knee, DOS 10/21/2019    Intractable neuropathic pain of left knee    Chronic cervical pain    Chondromalacia of knee, left    Screening mammogram for breast cancer    Moderate mixed hyperlipidemia not requiring statin therapy        Past Medical History:   Diagnosis Date    Acute sinus infection     on poab    Anxiety     Arthritis     Asthma     STARTED @ 41 YRS OLD, DR. BUENO, & [facility] ALLERGY DR. LOPEZ    COVID-19     2021    CRPS (complex regional pain syndrome) type I of lower limb     Elevated cholesterol     Frequent UTI     GERD (gastroesophageal reflux disease)     History of 2019 novel coronavirus disease (COVID-19)     Iliotibial band syndrome affecting left lower leg     Itching     BLE has some scratches to notify MD if doesn't clear up prior to surgery    Kidney stones     Lateral collateral ligament deficiency of left knee 2016    Lateral meniscal tear     Lumbar paraspinal muscle spasm 2017    Meniscal cyst, left 2019    Migraines     Patellofemoral pain syndrome of left knee 2019    PONV (postoperative nausea and vomiting)     Seasonal allergies     Subchondral insufficiency fracture of femoral condyle, left, initial encounter 01/10/2020    Tear of lateral meniscus of left knee, current 2019        Past Surgical History:   Procedure  "Laterality Date     SECTION      x2    DIAGNOSTIC LAPAROSCOPY      FOOT SURGERY Right     HERNIA REPAIR Bilateral     inguinal hernia age 6    INTRAUTERINE DEVICE INSERTION      KNEE ARTHROSCOPY Left 10/21/2016    Procedure: KNEE ARTHROSCOPY debridement of lateral meniscal tear;  Surgeon: Son Mojica MD;  Location:  [facility];  Service:     KNEE ARTHROSCOPY Left 2018    Procedure: KNEE ARTHROSCOPY;  Surgeon: Son Mojica MD;  Location:  [facility];  Service: Orthopedics    KNEE ARTHROSCOPY Left 2021    Procedure: LEFT KNEE DIAGNOSTIC  ARTHROSCOPY AND PARTLATERAL MENISECTOMY AND REMOVAL OF MULTIPLE FIXATION DEVICES;  Surgeon: Rodríguez Hamlin MD;  Location:  [facility];  Service: Orthopedics;  Laterality: Left;    KNEE MENISCAL REPAIR Left 10/21/2019    Procedure: knee arthroscopy, debridement of meniscal cyst, and lateral meniscal repair;  Surgeon: Bridger Figueroa MD;  Location:  [facility];  Service: Orthopedics    NERVE BLOCK Left 2021    Procedure: KNEE GENICULAR NERVE BLOCK UNDER FLUORO- x2, 3-4 wks apart;  Surgeon: James Spivey MD;  Location: [facility] OR;  Service: Pain Management;  Laterality: Left;        PT Ortho       Row Name 24 1500       Subjective    Subjective Comments \"My neck is doing better but I have been in front of the computer a lot this week so the back of my neck and shoulders feel really tender and tense.\" \"My knee is doing okay but the inside is still very sensitive but movment wise it is better; \"I have to go up down the steps a lot at work and by the end of the day it is sore\"; has trouble going up but not down. \"It also feels like it moves/shifts side to side and it popped loud 2 times today.\"  \"I can tell that I didn't have the traction last week and my neck does better when I have it.\" \"I do still wear my knee sleeve a few times a week.\"  -LN       Precautions and Contraindications    Precautions/Limitations no known precautions/limitations  -LN " "      Subjective Pain    Able to rate subjective pain? yes  -LN    Pre-Treatment Pain Level 2  neck pain  -LN    Subjective Pain Comment L knee (medial)= 3/10 today. \"more a discomfort\"  -LN              User Key  (r) = Recorded By, (t) = Taken By, (c) = Cosigned By      Initials Name Provider Type    Bren Rodriguez, PT Physical Therapist                                 PT Assessment/Plan       Row Name 08/20/24 1500          PT Assessment    Assessment Comments Pt with overall decreased c/o neck pain but she did report that she could tell that she didn't have the traction last week; continues to report good benefit from cervical traction. 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 She is doing well with neck and knee exercises and able to increase weight to 2 # on leg lifts, but did have to decrease reps. Overall decreased c/o L knee pain but still reports sensitivity medial knee area and still reports her knee occasionally feels unstable, knee sleeve does help with this.  -LN        PT Plan    PT Frequency 1x/week  -LN     Predicted Duration of Therapy Intervention (PT) for 2 more visits (she has 2 more visits approved by worker's comp). See below.  -LN     PT Plan Comments Continue 1 x week for 2 more weeks (she has 2 more visits approved by worker's comp at this time). Plan on discharge with HEP after 2 more visits for L knee, but may need to get more visits for neck pain; will continue to assess. Progress with exercises as tolerated; continue modalities PRN (); Continue with ICTX and increase weight as indicated/tolerated. Pt and posture education. Manual therapy/STM/MFR as needed.  -LN               User Key  (r) = Recorded By, (t) = Taken By, (c) = Cosigned By      Initials Name Provider Type    Bren Rodriguez, PT Physical Therapist                     Modalities       Row Name 08/20/24 1500             Precautions " "   Existing Precautions/Restrictions no known precautions/restrictions  -LN         Moist Heat    MH Applied Yes  -LN      Location Cervical spine & L knee (anterior and posterior) with pt supine in hooklying.  -LN      PT Moist Heat Minutes 15  -LN      MH S/P Rx Yes  after exercises and before ICTX  -LN         Traction 70440    Traction Type Cervical  with MH across shoulders  -LN      PT Traction Rx Minutes 15  -LN      Duration Intermittent  -LN      Position Hook-lying  -LN      Weight 15  -LN      Hold 60  -LN      Relax 20  -LN                User Key  (r) = Recorded By, (t) = Taken By, (c) = Cosigned By      Initials Name Provider Type    LN Justadominic Brendanii Stanley, PT Physical Therapist                   OP Exercises       Row Name 08/20/24 1500             Precautions    Existing Precautions/Restrictions no known precautions/restrictions  -LN         Subjective    Subjective Comments \"My neck is doing better but I have been in front of the computer a lot this week so the back of my neck and shoulders feel really tender and tense.\" \"My knee is doing okay but the inside is still very sensitive but movment wise it is better; \"I have to go up down the steps a lot at work and by the end of the day it is sore\"; has trouble going up but not down. \"It also feels like it moves/shifts side to side and it popped loud 2 times today.\"  \"I can tell that I didn't have the traction last week and my neck does better when I have it.\" \"I do still wear my knee sleeve a few times a week.\"  -LN         Subjective Pain    Able to rate subjective pain? yes  -LN      Pre-Treatment Pain Level 2  neck pain  -LN      Subjective Pain Comment L knee (medial)= 3/10 today. \"more a discomfort\"  -LN         Total Minutes    80652 - PT Therapeutic Exercise Minutes 15  -LN         Exercise 1    Exercise Name 1 QS over single towel roll  -LN      Reps 1 HEP  -LN         Exercise 2    Exercise Name 2 SAQ ball squeeze  -LN      Cueing 2 " Verbal;Tactile;Demo  -LN      Reps 2 HEP  -LN         Exercise 3    Exercise Name 3 SLR  -LN      Cueing 3 Verbal;Tactile;Demo  -LN      Reps 3 40  -LN      Time 3 2#  -LN         Exercise 4    Exercise Name 4 sidelying hip abduction  -LN      Cueing 4 Verbal;Tactile;Demo  -LN      Reps 4 35  -LN      Time 4 2#  -LN         Exercise 5    Exercise Name 5 sidelying hip adduction  -LN      Cueing 5 Verbal;Tactile;Demo  -LN      Reps 5 40  -LN      Time 5 2#  -LN         Exercise 6    Exercise Name 6 Active cervical SB/UT stretch  -LN         Exercise 7    Exercise Name 7 Levator scapula stretch  -LN         Exercise 8    Exercise Name 8 Scapular retraction  -LN         Exercise 9    Exercise Name 9 hooklying ball squeeze  -LN      Cueing 9 Verbal;Tactile;Demo  -LN      Reps 9 HEP  -LN         Exercise 10    Exercise Name 10 heel raises  -LN      Cueing 10 Verbal  -LN      Additional Comments HEP  -LN         Exercise 11    Exercise Name 11 TKE vs TB  -LN      Cueing 11 Verbal;Demo  -LN      Additional Comments HEP  -LN         Exercise 12    Exercise Name 12 Prone leg raises  -LN      Cueing 12 Verbal;Tactile;Demo  -LN      Reps 12 40  -LN      Time 12 2#  -LN         Exercise 13    Exercise Name 13 Bridge with ball  -LN      Cueing 13 Verbal;Tactile;Demo  -LN      Reps 13 HEP  -LN         Exercise 14    Exercise Name 14 corner stretch  -LN      Sets 14 HEP  -LN                User Key  (r) = Recorded By, (t) = Taken By, (c) = Cosigned By      Initials Name Provider Type    Bren Rodriguez, PT Physical Therapist                                     Therapy Education  Given: HEP, Symptoms/condition management, Posture/body mechanics  Program: Reinforced  How Provided: Verbal, Demonstration  Provided to: Patient  Level of Understanding: Teach back education performed, Verbalized, Demonstrated              Time Calculation:   Start Time: 1459  Stop Time: 1610  Time Calculation (min): 71 min  Timed Charges  50002  - PT Therapeutic Exercise Minutes: 15  Untimed Charges  PT Traction Rx Minutes: 15  PT Moist Heat Minutes: 15  Total Minutes  Timed Charges Total Minutes: 15  Untimed Charges Total Minutes: 30   Total Minutes: 45  Therapy Charges for Today       Code Description Service Date Service Provider Modifiers Qty    10215257909  PT THER PROC EA 15 MIN 8/20/2024 Bren Shepherd, PT GP 1    [id number] HC PT TRACTION CERVICAL 8/20/2024 Bren Shepherd, PT GP 1                      Bren Shepherd, PT  8/20/2024

## 2024-08-26 ENCOUNTER — APPOINTMENT (OUTPATIENT)
Dept: PHYSICAL THERAPY | Facility: HOSPITAL | Age: 54
End: 2024-08-26
Payer: OTHER MISCELLANEOUS

## 2024-08-29 ENCOUNTER — HOSPITAL ENCOUNTER (OUTPATIENT)
Dept: PHYSICAL THERAPY | Facility: HOSPITAL | Age: 54
Setting detail: THERAPIES SERIES
Discharge: HOME | End: 2024-08-29
Payer: OTHER MISCELLANEOUS

## 2024-08-29 DIAGNOSIS — M25.562 LEFT KNEE PAIN, UNSPECIFIED CHRONICITY: ICD-10-CM

## 2024-08-29 DIAGNOSIS — M54.2 NECK PAIN: Primary | ICD-10-CM

## 2024-08-29 PROCEDURE — 97110 THERAPEUTIC EXERCISES: CPT

## 2024-08-29 PROCEDURE — 97012 MECHANICAL TRACTION THERAPY: CPT

## 2024-08-29 NOTE — THERAPY TREATMENT NOTE
Outpatient Physical Therapy Ortho Treatment Note  [facility]EX[facility]     Patient Name: Jocelyn Ríos  : 1970  MRN: [id number]  Today's Date: 2024      Visit Date: 2024      Visit Dx:    ICD-10-CM ICD-9-CM   1. Neck pain  M54.2 723.1   2. Left knee pain, unspecified chronicity  M25.562 719.46       Patient Active Problem List   Diagnosis    Anxiety    Moderate persistent asthma without complication    Environmental allergies    Routine adult health maintenance    Complex regional pain syndrome i of left lower limb    Carpal tunnel syndrome on both sides    Status post arthroscopic surgery of left knee, DOS 10/21/2019    Intractable neuropathic pain of left knee    Chronic cervical pain    Chondromalacia of knee, left    Screening mammogram for breast cancer    Moderate mixed hyperlipidemia not requiring statin therapy        Past Medical History:   Diagnosis Date    Acute sinus infection     on poab    Anxiety     Arthritis     Asthma     STARTED @ 41 YRS OLD, DR. BUENO, & [facility] DR. LOPEZ    COVID-19     2021    CRPS (complex regional pain syndrome) type I of lower limb     Elevated cholesterol     Frequent UTI     GERD (gastroesophageal reflux disease)     History of 2019 novel coronavirus disease (COVID-19)     Iliotibial band syndrome affecting left lower leg     Itching     BLE has some scratches to notify MD if doesn't clear up prior to surgery    Kidney stones     Lateral collateral ligament deficiency of left knee 2016    Lateral meniscal tear     Lumbar paraspinal muscle spasm 2017    Meniscal cyst, left 2019    Migraines     Patellofemoral pain syndrome of left knee 2019    PONV (postoperative nausea and vomiting)     Seasonal allergies     Subchondral insufficiency fracture of femoral condyle, left, initial encounter 01/10/2020    Tear of lateral meniscus of left knee, current 2019        Past Surgical History:   Procedure  "Laterality Date     SECTION      x2    DIAGNOSTIC LAPAROSCOPY      FOOT SURGERY Right     HERNIA REPAIR Bilateral     inguinal hernia age 6    INTRAUTERINE DEVICE INSERTION      KNEE ARTHROSCOPY Left 10/21/2016    Procedure: KNEE ARTHROSCOPY debridement of lateral meniscal tear;  Surgeon: Son Mojica MD;  Location:  [facility];  Service:     KNEE ARTHROSCOPY Left 2018    Procedure: KNEE ARTHROSCOPY;  Surgeon: Son Mojica MD;  Location:  [facility];  Service: Orthopedics    KNEE ARTHROSCOPY Left 2021    Procedure: LEFT KNEE DIAGNOSTIC  ARTHROSCOPY AND PARTLATERAL MENISECTOMY AND REMOVAL OF MULTIPLE FIXATION DEVICES;  Surgeon: Rodríguez Halmin MD;  Location:  [facility];  Service: Orthopedics;  Laterality: Left;    KNEE MENISCAL REPAIR Left 10/21/2019    Procedure: knee arthroscopy, debridement of meniscal cyst, and lateral meniscal repair;  Surgeon: Bridger Figueroa MD;  Location:  [facility];  Service: Orthopedics    NERVE BLOCK Left 2021    Procedure: KNEE GENICULAR NERVE BLOCK UNDER FLUORO- x2, 3-4 wks apart;  Surgeon: James Spivey MD;  Location: [facility] OR;  Service: Pain Management;  Laterality: Left;        PT Ortho       Row Name 24 1600       Subjective    Subjective Comments \"My neck is doing a little better and I feel like I can move it better.\" \"The discomfort is still there but not as bad.\" Pt still reports pain and swelling in medial knee and \"it still cracks but I know that isn't going to get any better.\" Pt reports she was sitting at a computer all day today and her neck didn't get as stiff as it used to and her knee didn't hurt as bad as it used to when she sat all day.  -LN       Precautions and Contraindications    Precautions/Limitations no known precautions/limitations  -LN       Subjective Pain    Able to rate subjective pain? yes  -LN    Pre-Treatment Pain Level 4  -LN    Subjective Pain Comment L knee (medial) 3/10.  -LN       Head/Neck/Trunk    Neck Lt " Rotation AROM WFL  -LN    Neck Rt Rotation AROM WFL  -LN              User Key  (r) = Recorded By, (t) = Taken By, (c) = Cosigned By      Initials Name Provider Type    Bren Rodriguez, PT Physical Therapist                                 PT Assessment/Plan       Row Name 08/29/24 1700          PT Assessment    Assessment Comments Pt with overall decreased c/o neck pain and L knee pain, but medial knee pain/sensitivity persists as well as popping in knee. Pt with WFL cervical rotation noted today B. She is independent with her HEP and no limp noted. Pt did have some soreness and tightness in neck reported today after sitting at computer all day at work, but reported not as bad as it used to be. She does continue to report good benefit from ICTX.  -LN        PT Plan    PT Frequency 1x/week  -LN     Predicted Duration of Therapy Intervention (PT) for 1 more visit (she has 1 more visit approved by worker's comp). See below.  -LN     PT Plan Comments Continue 1 x week for 1 more week (she has 1 more visit approved by worker's comp at this time). Plan on discharge with HEP after 1 more visit for L knee, but may need to get more visits for neck pain; will continue to assess. Progress with exercises as tolerated; continue modalities PRN (); Continue with ICTX and increase weight as indicated/tolerated. Pt and posture education. Manual therapy/STM/MFR as needed. Next visit is scheduled for 9/11/24.  -LN               User Key  (r) = Recorded By, (t) = Taken By, (c) = Cosigned By      Initials Name Provider Type    Bren Rodriguez, PT Physical Therapist                     Modalities       Row Name 08/29/24 1600             Moist Heat    MH Applied Yes  -LN      Location Cervical spine & L knee (anterior and posterior) with pt supine in hooklying.  -LN      PT Moist Heat Minutes 15  -LN       S/P Rx Yes  after exercises and before ICTX  -LN         Traction 03601    Traction Type Cervical  with   "across shoulders  -LN      PT Traction Rx Minutes 15  -LN      Duration Intermittent  -LN      Position Hook-lying  -LN      Weight 15  -LN      Hold 60  -LN      Relax 20  -LN                User Key  (r) = Recorded By, (t) = Taken By, (c) = Cosigned By      Initials Name Provider Type    LN Bren Shepherd, PT Physical Therapist                   OP Exercises       Row Name 08/29/24 1600             Precautions    Existing Precautions/Restrictions no known precautions/restrictions  -LN         Subjective    Subjective Comments \"My neck is doing a little better and I feel like I can move it better.\" \"The discomfort is still there but not as bad.\" Pt still reports pain and swelling in medial knee and \"it still cracks but I know that isn't going to get any better.\" Pt reports she was sitting at a computer all day today and her neck didn't get as stiff as it used to and her knee didn't hurt as bad as it used to when she sat all day.  -LN         Subjective Pain    Able to rate subjective pain? yes  -LN      Pre-Treatment Pain Level 4  -LN      Subjective Pain Comment L knee (medial) 3/10.  -LN         Total Minutes    56942 - PT Therapeutic Exercise Minutes 15  -LN         Exercise 1    Exercise Name 1 QS over single towel roll  -LN      Reps 1 HEP  -LN         Exercise 2    Exercise Name 2 SAQ ball squeeze  -LN      Cueing 2 Verbal;Tactile;Demo  -LN      Reps 2 HEP  -LN         Exercise 3    Exercise Name 3 SLR  -LN      Cueing 3 Verbal;Tactile;Demo  -LN      Reps 3 40  -LN      Time 3 2#  -LN         Exercise 4    Exercise Name 4 sidelying hip abduction  -LN      Cueing 4 Verbal;Tactile;Demo  -LN      Reps 4 35  -LN      Time 4 2#  -LN         Exercise 5    Exercise Name 5 sidelying hip adduction  -LN      Cueing 5 Verbal;Tactile;Demo  -LN      Reps 5 40  -LN      Time 5 2#  -LN         Exercise 6    Exercise Name 6 Active cervical SB/UT stretch  -LN         Exercise 7    Exercise Name 7 Levator scapula " stretch  -LN         Exercise 8    Exercise Name 8 Scapular retraction  -LN         Exercise 9    Exercise Name 9 hooklying ball squeeze  -LN      Cueing 9 Verbal;Tactile;Demo  -LN      Reps 9 HEP  -LN         Exercise 10    Exercise Name 10 heel raises  -LN      Cueing 10 Verbal  -LN         Exercise 11    Exercise Name 11 TKE vs TB  -LN      Cueing 11 Verbal;Demo  -LN         Exercise 12    Exercise Name 12 Prone leg raises  -LN      Cueing 12 Verbal;Tactile;Demo  -LN      Reps 12 40  -LN      Time 12 2#  -LN         Exercise 13    Exercise Name 13 Bridge with ball  -LN      Cueing 13 Verbal;Tactile;Demo  -LN      Reps 13 HEP  -LN         Exercise 14    Exercise Name 14 corner stretch  -LN      Sets 14 HEP  -LN                User Key  (r) = Recorded By, (t) = Taken By, (c) = Cosigned By      Initials Name Provider Type    Bren Rodriguez, PT Physical Therapist                                     Therapy Education  Given: HEP, Symptoms/condition management, Posture/body mechanics  Program: Reinforced  How Provided: Verbal, Demonstration  Provided to: Patient  Level of Understanding: Teach back education performed, Verbalized, Demonstrated              Time Calculation:   Start Time: 1615  Stop Time: 1705  Time Calculation (min): 50 min  Timed Charges  59413 - PT Therapeutic Exercise Minutes: 15  Untimed Charges  PT Traction Rx Minutes: 15  PT Moist Heat Minutes: 15  Total Minutes  Timed Charges Total Minutes: 15  Untimed Charges Total Minutes: 30   Total Minutes: 45  Therapy Charges for Today       Code Description Service Date Service Provider Modifiers Qty    67700131691 HC PT TRACTION CERVICAL 8/29/2024 Bren Shepherd, PT GP 1    57858158147 HC PT THER PROC EA 15 MIN 8/29/2024 Bren Shepherd, PT GP 1                      Bren Shepherd, PT  8/29/2024

## 2024-09-06 ENCOUNTER — TELEPHONE (OUTPATIENT)
Dept: FAMILY MEDICINE CLINIC | Facility: CLINIC | Age: 54
End: 2024-09-06
Payer: COMMERCIAL

## 2024-09-06 DIAGNOSIS — F41.9 ANXIETY: ICD-10-CM

## 2024-09-06 DIAGNOSIS — J45.40 MODERATE PERSISTENT ASTHMA WITHOUT COMPLICATION: ICD-10-CM

## 2024-09-06 RX ORDER — ALBUTEROL SULFATE 90 UG/1
2 AEROSOL, METERED RESPIRATORY (INHALATION) EVERY 4 HOURS PRN
Qty: 8 G | Refills: 3 | Status: SHIPPED | OUTPATIENT
Start: 2024-09-06

## 2024-09-06 RX ORDER — CITALOPRAM HYDROBROMIDE 20 MG/1
20 TABLET ORAL DAILY
Qty: 90 TABLET | Refills: 0 | Status: SHIPPED | OUTPATIENT
Start: 2024-09-06

## 2024-09-06 RX ORDER — BUDESONIDE AND FORMOTEROL FUMARATE DIHYDRATE 160; 4.5 UG/1; UG/1
2 AEROSOL RESPIRATORY (INHALATION)
Qty: 10.2 G | Refills: 3 | Status: SHIPPED | OUTPATIENT
Start: 2024-09-06

## 2024-09-06 NOTE — TELEPHONE ENCOUNTER
"Caller: Jocelyn Ríos \"Jocelyn Jensen\"    Relationship: Self    Best call back number: [phone]     What medication are you requesting: albuterol sulfate  (90 Base) MCG/ACT inhaler   citalopram (CeleXA) 20 MG tablet   Symbicort 160-4.5 MCG/ACT inhaler     If a prescription is needed, what is your preferred pharmacy and phone number: [facility] [id number] - [address] - [phone]  - [phone]      Additional notes: PATIENT STATES THAT SHE HAS CHANGED PHARMACIES, AND NEEDS PRESCRIPTIONS SENT TO [facility] FOR REFILLS    "

## 2024-09-11 ENCOUNTER — DOCUMENTATION (OUTPATIENT)
Dept: PHYSICAL THERAPY | Facility: HOSPITAL | Age: 54
End: 2024-09-11
Payer: COMMERCIAL

## 2024-09-17 ENCOUNTER — DOCUMENTATION (OUTPATIENT)
Dept: OCCUPATIONAL THERAPY | Facility: HOSPITAL | Age: 54
End: 2024-09-17
Payer: COMMERCIAL

## 2024-10-16 ENCOUNTER — DOCUMENTATION (OUTPATIENT)
Dept: OCCUPATIONAL THERAPY | Facility: HOSPITAL | Age: 54
End: 2024-10-16
Payer: COMMERCIAL

## 2024-10-16 DIAGNOSIS — M79.642 LEFT HAND PAIN: Primary | ICD-10-CM

## 2024-10-16 DIAGNOSIS — M25.532 LEFT WRIST PAIN: ICD-10-CM

## 2024-10-16 NOTE — THERAPY DISCHARGE NOTE
Outpatient Occupational Therapy Discharge Summary         Patient Name: Jocelyn Ríos  : 1970  MRN: [id number]    Today's Date: 10/16/2024      Visit Date: 10/16/2024       OT Goals       Row Name 10/16/24 1200          OT Short Term Goals    STG Date to Achieve 24  -EN     STG 1 Patient will demonstrate independence with HEP/edema management.  -EN     STG 1 Progress Met  -EN     STG 2 Patient will report decreased L wrist/hand pain with use by 50%.  -EN     STG 2 Progress Met  -EN     STG 3 Patient will demonstrate improved left  strength 5# for improved grasp of objects.  -EN     STG 3 Progress Met  -EN     STG 4 Patient will report decreased wrist pain with activity by 50% (currently 4/10).  -EN     STG 4 Progress Partially Met  -EN        Long Term Goals    LTG Date to Achieve 24  -EN     LTG 1 Patient will demonstrate 5/5 wrist/forearm strength for improved ADL/IADL independence.  -EN     LTG 1 Progress Met  -EN     LTG 2 Patient will demonstrate improved L  strength by 15# or more for improved grasp of objects.  -EN     LTG 2 Progress Met  -EN     LTG 3 Improved Quick Dash 40 points or more.  -EN     LTG 3 Progress Partially Met  -EN     LTG 4 Patient will report decreased pain with use by 75%.  -EN     LTG 4 Progress Partially Met  -EN               User Key  (r) = Recorded By, (t) = Taken By, (c) = Cosigned By      Initials Name Provider Type    Natalia Bettencourt OTR Occupational Therapist                     OP OT Discharge Summary  Reason for Discharge: Maximum functional potential achieved  Outcomes Achieved: Patient able to partially acheive established goals  Discharge Destination: Other (comment)  Discharge Instructions: MRI results were: Dorsal wrist capsulitis and/or capsular sprain  Intact TFC and intrinsic ligaments  Pisotriquetral capsulosynovial cyst. Subtle debris within the cyst lumen.  Flat cyst dorsal capsule. Differential considerations include  both ganglion and capsulosynovial cyst.     Advised patient to stop strengthening exercises, continue with edema management and ROM. Patient has edema glove/tubigrip and brace to wear for comfort until seen by hand specialist. Patient had plan to see hand surgeon, did not call to schedule further appointments.      Time Calculation:                         Natalia Barrios, OTR   10/16/2024

## 2024-11-22 ENCOUNTER — TELEPHONE (OUTPATIENT)
Dept: FAMILY MEDICINE CLINIC | Facility: CLINIC | Age: 54
End: 2024-11-22

## 2024-11-22 ENCOUNTER — TRANSCRIBE ORDERS (OUTPATIENT)
Dept: ADMINISTRATIVE | Facility: HOSPITAL | Age: 54
End: 2024-11-22
Payer: COMMERCIAL

## 2024-11-22 DIAGNOSIS — Z12.11 COLON CANCER SCREENING: Primary | ICD-10-CM

## 2024-11-22 DIAGNOSIS — Z12.31 BREAST CANCER SCREENING BY MAMMOGRAM: Primary | ICD-10-CM

## 2024-11-22 NOTE — TELEPHONE ENCOUNTER
"Caller: Jocelyn Ríos \"Jocelyn Jensen\"    Relationship: Self    Best call back number: [phone]     What orders are you requesting (i.e. lab or imaging): COLOGUARD SCREENING    Additional notes: PATIENT STATES THAT SHE RECEIVED AN EMAIL INDICATING THAT SHE WAS DUE FOR A COLOGUARD SCREENING. REQUESTS ORDERS  "

## 2024-12-22 ENCOUNTER — APPOINTMENT (OUTPATIENT)
Dept: ULTRASOUND IMAGING | Facility: HOSPITAL | Age: 54
End: 2024-12-22
Payer: COMMERCIAL

## 2024-12-22 ENCOUNTER — HOSPITAL ENCOUNTER (EMERGENCY)
Facility: HOSPITAL | Age: 54
Discharge: HOME | End: 2024-12-22
Attending: STUDENT IN AN ORGANIZED HEALTH CARE EDUCATION/TRAINING PROGRAM | Admitting: STUDENT IN AN ORGANIZED HEALTH CARE EDUCATION/TRAINING PROGRAM
Payer: COMMERCIAL

## 2024-12-22 VITALS
TEMPERATURE: 98.5 F | RESPIRATION RATE: 14 BRPM | HEIGHT: 57 IN | BODY MASS INDEX: 23.3 KG/M2 | DIASTOLIC BLOOD PRESSURE: 83 MMHG | OXYGEN SATURATION: 97 % | SYSTOLIC BLOOD PRESSURE: 130 MMHG | WEIGHT: 108 LBS | HEART RATE: 68 BPM

## 2024-12-22 DIAGNOSIS — S30.814A ABRASION OF VAGINA, INITIAL ENCOUNTER: ICD-10-CM

## 2024-12-22 DIAGNOSIS — N76.0 ACUTE VAGINITIS: Primary | ICD-10-CM

## 2024-12-22 LAB
B-HCG UR QL: NEGATIVE
BACTERIA UR QL AUTO: ABNORMAL /HPF
BILIRUB UR QL STRIP: NEGATIVE
CLARITY UR: CLEAR
CLUE CELLS SPEC QL WET PREP: ABNORMAL
COLOR UR: YELLOW
GLUCOSE UR STRIP-MCNC: NEGATIVE MG/DL
HGB UR QL STRIP.AUTO: ABNORMAL
HYALINE CASTS UR QL AUTO: ABNORMAL /LPF
HYDATID CYST SPEC WET PREP: ABNORMAL
KETONES UR QL STRIP: ABNORMAL
LEUKOCYTE ESTERASE UR QL STRIP.AUTO: ABNORMAL
NITRITE UR QL STRIP: NEGATIVE
PH UR STRIP.AUTO: 6.5 [PH] (ref 4.5–8)
PROT UR QL STRIP: NEGATIVE
RBC # UR STRIP: ABNORMAL /HPF
REF LAB TEST METHOD: ABNORMAL
SP GR UR STRIP: 1.02 (ref 1–1.03)
SQUAMOUS #/AREA URNS HPF: ABNORMAL /HPF
T VAGINALIS SPEC QL WET PREP: ABNORMAL
UROBILINOGEN UR QL STRIP: ABNORMAL
WBC # UR STRIP: ABNORMAL /HPF
WBC SPEC QL WET PREP: ABNORMAL
YEAST GENITAL QL WET PREP: ABNORMAL

## 2024-12-22 PROCEDURE — 81001 URINALYSIS AUTO W/SCOPE: CPT | Performed by: STUDENT IN AN ORGANIZED HEALTH CARE EDUCATION/TRAINING PROGRAM

## 2024-12-22 PROCEDURE — 76830 TRANSVAGINAL US NON-OB: CPT

## 2024-12-22 PROCEDURE — 96372 THER/PROPH/DIAG INJ SC/IM: CPT

## 2024-12-22 PROCEDURE — 99284 EMERGENCY DEPT VISIT MOD MDM: CPT | Performed by: STUDENT IN AN ORGANIZED HEALTH CARE EDUCATION/TRAINING PROGRAM

## 2024-12-22 PROCEDURE — 76856 US EXAM PELVIC COMPLETE: CPT

## 2024-12-22 PROCEDURE — 25010000002 CEFTRIAXONE PER 250 MG: Performed by: STUDENT IN AN ORGANIZED HEALTH CARE EDUCATION/TRAINING PROGRAM

## 2024-12-22 PROCEDURE — 25010000002 LIDOCAINE PF 1% 1 % SOLUTION 5 ML VIAL: Performed by: STUDENT IN AN ORGANIZED HEALTH CARE EDUCATION/TRAINING PROGRAM

## 2024-12-22 PROCEDURE — 87210 SMEAR WET MOUNT SALINE/INK: CPT | Performed by: STUDENT IN AN ORGANIZED HEALTH CARE EDUCATION/TRAINING PROGRAM

## 2024-12-22 PROCEDURE — 81025 URINE PREGNANCY TEST: CPT | Performed by: STUDENT IN AN ORGANIZED HEALTH CARE EDUCATION/TRAINING PROGRAM

## 2024-12-22 PROCEDURE — 87591 N.GONORRHOEAE DNA AMP PROB: CPT | Performed by: STUDENT IN AN ORGANIZED HEALTH CARE EDUCATION/TRAINING PROGRAM

## 2024-12-22 PROCEDURE — 87491 CHLMYD TRACH DNA AMP PROBE: CPT | Performed by: STUDENT IN AN ORGANIZED HEALTH CARE EDUCATION/TRAINING PROGRAM

## 2024-12-22 RX ORDER — DOXYCYCLINE 100 MG/1
100 CAPSULE ORAL 2 TIMES DAILY
Qty: 14 CAPSULE | Refills: 0 | Status: SHIPPED | OUTPATIENT
Start: 2024-12-22 | End: 2024-12-29

## 2024-12-22 RX ADMIN — LIDOCAINE HYDROCHLORIDE 490 MG: 10 INJECTION, SOLUTION EPIDURAL; INFILTRATION; INTRACAUDAL; PERINEURAL at 15:42

## 2024-12-22 NOTE — ED PROVIDER NOTES
Subjective   History of Present Illness  54 F presents to ed w/ cc of 1 wk of prog. Worsening left vaginal vault pain, worse w/ intercourse, occurring under the context of sexual activity w/ . Denies f/c, discharge, bleeding. ROS otherwise neg. Vitals wnl. On exam pt well-appearing, abrasion of left vaginal wall, ttp, no fluctuance, -cmt, os closed.   Review of Systems    Past Medical History:   Diagnosis Date    Acute sinus infection     on poab    Anxiety     Arthritis     Asthma     STARTED @ 41 YRS OLD, DR. BUENO, & [facility] ALLERGY DR. LOPEZ    COVID-19     2021    CRPS (complex regional pain syndrome) type I of lower limb     Elevated cholesterol     Frequent UTI     GERD (gastroesophageal reflux disease)     History of 2019 novel coronavirus disease (COVID-19)     Iliotibial band syndrome affecting left lower leg     Itching     BLE has some scratches to notify MD if doesn't clear up prior to surgery    Kidney stones     Lateral collateral ligament deficiency of left knee 2016    Lateral meniscal tear     Lumbar paraspinal muscle spasm 2017    Meniscal cyst, left 2019    Migraines     Patellofemoral pain syndrome of left knee 2019    PONV (postoperative nausea and vomiting)     Seasonal allergies     Subchondral insufficiency fracture of femoral condyle, left, initial encounter 01/10/2020    Tear of lateral meniscus of left knee, current 2019       Allergies   Allergen Reactions    Meloxicam Rash and GI Intolerance     And HIVES , vomits    Shellfish-Derived Products Swelling and Angioedema       Past Surgical History:   Procedure Laterality Date     SECTION      x2    DIAGNOSTIC LAPAROSCOPY      FOOT SURGERY Right     HERNIA REPAIR Bilateral     inguinal hernia age 6    INTRAUTERINE DEVICE INSERTION      KNEE ARTHROSCOPY Left 10/21/2016    Procedure: KNEE ARTHROSCOPY debridement of lateral meniscal tear;  Surgeon: Son Mojica MD;  Location: [facility]  OR;  Service:     KNEE ARTHROSCOPY Left 7/27/2018    Procedure: KNEE ARTHROSCOPY;  Surgeon: Son Mojica MD;  Location:  [facility] OR;  Service: Orthopedics    KNEE ARTHROSCOPY Left 12/2/2021    Procedure: LEFT KNEE DIAGNOSTIC  ARTHROSCOPY AND PARTLATERAL MENISECTOMY AND REMOVAL OF MULTIPLE FIXATION DEVICES;  Surgeon: Rodríguez Hamlin MD;  Location:  [facility];  Service: Orthopedics;  Laterality: Left;    KNEE MENISCAL REPAIR Left 10/21/2019    Procedure: knee arthroscopy, debridement of meniscal cyst, and lateral meniscal repair;  Surgeon: Bridger Figueroa MD;  Location:  [facility] OR;  Service: Orthopedics    NERVE BLOCK Left 5/25/2021    Procedure: KNEE GENICULAR NERVE BLOCK UNDER FLUORO- x2, 3-4 wks apart;  Surgeon: James Spivey MD;  Location: [facility] [place] [facility] OR;  Service: Pain Management;  Laterality: Left;       Family History   Problem Relation Age of Onset    Heart disease Mother         ?    Cancer Father         ?    No Known Problems Sister         unknown whereabouts    No Known Problems Brother         unknown whereabouts    ADD / ADHD Son     No Known Problems Son     Vasile Hyperthermia Neg Hx     Breast cancer Neg Hx        Social History     Socioeconomic History    Marital status:     Number of children: 2    Years of education: college    Highest education level: Bachelor's degree (e.g., BA, AB, BS)   Tobacco Use    Smoking status: Never    Smokeless tobacco: Never   Vaping Use    Vaping status: Never Used   Substance and Sexual Activity    Alcohol use: Yes     Comment: 1-2 glasses monthly    Drug use: Never    Sexual activity: Yes     Partners: Male           Objective   Physical Exam    Procedures           ED Course                                                       Medical Decision Making    54 F presents to ed w/ cc of 1 wk of prog. Worsening left vaginal vault pain, worse w/ intercourse, occurring under the context of sexual activity w/ . Denies f/c, discharge, bleeding. ROS  otherwise neg. Vitals wnl. On exam pt well-appearing, abrasion of left vaginal wall, ttp, no fluctuance, -cmt, os closed.     Concern for abrasion to vault; as well as STIs; eval with wet prep; GC tvus.    Tvus neg; wet prep shows WBCs; possible vaginitis; dc with cef IM, doxycycline PO, obgyn fu.    Hst pt  Dsp. home  Final diagnoses:   Acute vaginitis   Abrasion of vagina, initial encounter       ED Disposition  ED Disposition       ED Disposition   Discharge    Condition   Stable    Comment   --               -  FOLLOW-UP CARE (2 days):  Tania Ramirez MD - Obstetrics And Gynecology  [facility]  [address], [address]  [place] [address] [phone]             Medication List        New Prescriptions      doxycycline 100 MG capsule  Commonly known as: MONODOX  Take 1 capsule by mouth 2 (Two) Times a Day for 7 days.               Where to Get Your Medications        These medications were sent to [facility] [id number] - [place] [address] - [phone] [facility] [phone] [address] [address]      Phone: [phone]   doxycycline 100 MG capsule            David Philippe MD  12/22/24 [id number]

## 2024-12-24 LAB
C TRACH RRNA SPEC QL NAA+PROBE: NEGATIVE
N GONORRHOEA RRNA SPEC QL NAA+PROBE: NEGATIVE

## 2024-12-31 ENCOUNTER — OFFICE VISIT (OUTPATIENT)
Dept: OBSTETRICS AND GYNECOLOGY | Facility: CLINIC | Age: 54
End: 2024-12-31
Payer: COMMERCIAL

## 2024-12-31 VITALS — WEIGHT: 111 LBS | HEIGHT: 57 IN | BODY MASS INDEX: 23.95 KG/M2

## 2024-12-31 DIAGNOSIS — S39.93XD INJURY OF VAGINA, SUBSEQUENT ENCOUNTER: Primary | ICD-10-CM

## 2024-12-31 DIAGNOSIS — Z13.89 SCREENING FOR GENITOURINARY CONDITION: ICD-10-CM

## 2024-12-31 LAB
BILIRUB BLD-MCNC: NEGATIVE MG/DL
CLARITY, POC: CLEAR
COLOR UR: YELLOW
GLUCOSE UR STRIP-MCNC: NEGATIVE MG/DL
KETONES UR QL: NEGATIVE
LEUKOCYTE EST, POC: NEGATIVE
NITRITE UR-MCNC: NEGATIVE MG/ML
PH UR: 5 [PH] (ref 5–8)
PROT UR STRIP-MCNC: ABNORMAL MG/DL
RBC # UR STRIP: NEGATIVE /UL
SP GR UR: 1.01 (ref 1–1.03)
UROBILINOGEN UR QL: NORMAL

## 2024-12-31 NOTE — PROGRESS NOTES
"      Jocelyn Ríos is a 54 y.o. patient who presents for follow up of   Chief Complaint   Patient presents with    Vaginal Pain    Vaginitis     54-year-old female here to follow-up ED visit.  Had vaginal trauma from yeast infection applicator.  Thinks it may have been infected.  Was seen in the ED and given IM injection of antibiotics and oral doxycycline.  This was 10 days ago.  Antibiotics are gone.  Symptoms have completely resolved.  Here for follow-up.  HPI     The following portions of the patient's history were reviewed and updated as appropriate: allergies, current medications and problem list.    Review of Systems   Constitutional:  Negative for appetite change, fever and unexpected weight change.   HENT:  Negative for congestion and sore throat.    Respiratory:  Negative for cough and shortness of breath.    Cardiovascular:  Negative for chest pain and palpitations.   Gastrointestinal:  Negative for abdominal distention, abdominal pain, constipation, diarrhea, nausea and vomiting.   Endocrine: Negative.    Genitourinary:  Negative for dyspareunia, menstrual problem, pelvic pain and vaginal discharge.   Skin: Negative.    Neurological:  Negative for dizziness and syncope.   Hematological: Negative.    Psychiatric/Behavioral:  Negative for dysphoric mood and sleep disturbance. The patient is not nervous/anxious.      Ht 144.8 cm (57\")   Wt 50.3 kg (111 lb)   LMP  (LMP Unknown)   BMI 24.02 kg/m²     Physical Exam  Vitals and nursing note reviewed. Exam conducted with a chaperone present.   Constitutional:       Appearance: She is well-developed.   HENT:      Head: Normocephalic and atraumatic.   Pulmonary:      Effort: Pulmonary effort is normal. No respiratory distress.   Abdominal:      General: There is no distension.      Palpations: Abdomen is soft. There is no mass.      Tenderness: There is no abdominal tenderness. There is no guarding or rebound.      Hernia: There is no hernia in the " left inguinal area or right inguinal area.   Genitourinary:     General: Normal vulva.      Exam position: Lithotomy position.      Labia:         Right: No rash or lesion.         Left: No rash or lesion.       Vagina: No signs of injury and foreign body. No vaginal discharge, erythema, tenderness or bleeding.      Cervix: Normal.      Uterus: Normal.       Adnexa: Right adnexa normal and left adnexa normal.   Musculoskeletal:         General: Normal range of motion.   Skin:     General: Skin is warm and dry.   Neurological:      Mental Status: She is alert and oriented to person, place, and time.   Psychiatric:         Behavior: Behavior normal.         Thought Content: Thought content normal.         Judgment: Judgment normal.       Assessment/Plan    Diagnoses and all orders for this visit:    1. Injury of vagina, subsequent encounter (Primary)    2. Screening for genitourinary condition  -     POC Urinalysis Dipstick    Well-healed.  Normal exam.  No follow-up needed.    Return if symptoms worsen or fail to improve.    I spent 10 minutes caring for Jocelyn on this date of service. This time includes time spent by me in the following activities: preparing for the visit, reviewing tests, performing a medically appropriate examination and/or evaluation, counseling and educating the patient/family/caregiver, and documenting information in the medical record.     Iraj Lackey MD  12/31/2024  16:24 EST

## 2025-01-02 ENCOUNTER — HOSPITAL ENCOUNTER (OUTPATIENT)
Dept: PHYSICAL THERAPY | Facility: HOSPITAL | Age: 55
Setting detail: THERAPIES SERIES
Discharge: HOME | End: 2025-01-02
Payer: OTHER MISCELLANEOUS

## 2025-01-02 DIAGNOSIS — M54.2 NECK PAIN: Primary | ICD-10-CM

## 2025-01-02 PROCEDURE — 97161 PT EVAL LOW COMPLEX 20 MIN: CPT

## 2025-01-02 NOTE — THERAPY EVALUATION
Outpatient Physical Therapy Ortho Initial Evaluation   [facility]     Patient Name: Joeclyn Ríos  : 1970  MRN: [id number]  Today's Date: 2025        Visit Date: 2025    Patient Active Problem List   Diagnosis    Anxiety    Moderate persistent asthma without complication    Environmental allergies    Routine adult health maintenance    Complex regional pain syndrome i of left lower limb    Carpal tunnel syndrome on both sides    Status post arthroscopic surgery of left knee, DOS 10/21/2019    Intractable neuropathic pain of left knee    Chronic cervical pain    Chondromalacia of knee, left    Screening mammogram for breast cancer    Moderate mixed hyperlipidemia not requiring statin therapy        Past Medical History:   Diagnosis Date    Acute sinus infection     on poab    Anxiety     Arthritis     Asthma     STARTED @ 41 YRS OLD, DR. BUENO, & [facility] ALLERGY DR. LOPEZ    COVID-19     2021    CRPS (complex regional pain syndrome) type I of lower limb     Elevated cholesterol     Frequent UTI     GERD (gastroesophageal reflux disease)     History of 2019 novel coronavirus disease (COVID-19)     Iliotibial band syndrome affecting left lower leg     Itching     BLE has some scratches to notify MD if doesn't clear up prior to surgery    Kidney stones     Lateral collateral ligament deficiency of left knee 2016    Lateral meniscal tear     Lumbar paraspinal muscle spasm 2017    Meniscal cyst, left 2019    Migraines     Patellofemoral pain syndrome of left knee 2019    PONV (postoperative nausea and vomiting)     Seasonal allergies     Subchondral insufficiency fracture of femoral condyle, left, initial encounter 01/10/2020    Tear of lateral meniscus of left knee, current 2019        Past Surgical History:   Procedure Laterality Date     SECTION      x2    DIAGNOSTIC LAPAROSCOPY      FOOT SURGERY Right     HERNIA REPAIR Bilateral      inguinal hernia age 6    INTRAUTERINE DEVICE INSERTION      KNEE ARTHROSCOPY Left 10/21/2016    Procedure: KNEE ARTHROSCOPY debridement of lateral meniscal tear;  Surgeon: Son Mojica MD;  Location:  [facility];  Service:     KNEE ARTHROSCOPY Left 7/27/2018    Procedure: KNEE ARTHROSCOPY;  Surgeon: Son Mojica MD;  Location:  [facility];  Service: Orthopedics    KNEE ARTHROSCOPY Left 12/2/2021    Procedure: LEFT KNEE DIAGNOSTIC  ARTHROSCOPY AND PARTLATERAL MENISECTOMY AND REMOVAL OF MULTIPLE FIXATION DEVICES;  Surgeon: Rodríguez Hamlin MD;  Location:  [facility];  Service: Orthopedics;  Laterality: Left;    KNEE MENISCAL REPAIR Left 10/21/2019    Procedure: knee arthroscopy, debridement of meniscal cyst, and lateral meniscal repair;  Surgeon: Bridger Figueroa MD;  Location:  [facility];  Service: Orthopedics    NERVE BLOCK Left 5/25/2021    Procedure: KNEE GENICULAR NERVE BLOCK UNDER FLUORO- x2, 3-4 wks apart;  Surgeon: James Spivey MD;  [place] [facility] [place]  Service: Pain Management;  Laterality: Left;       Visit Dx:     ICD-10-CM ICD-9-CM   1. Neck pain  M54.2 723.1          Patient History       Row Name 01/02/25 0800             History    Chief Complaint Difficulty with daily activities;Headache;Numbness;Tinglings;Tightness;Pain;Muscle tenderness;Muscle weakness  -LN      Type of Pain Neck pain  -LN      Date Current Problem(s) Began --  March 2024= fall at work  -LN      Brief Description of Current Complaint Pt fell at work (slipped on a wet floor) in March 2024 and bossman her neck during the fall. She did receive PT, including ICTX, from 4/29/24-8/20/24 with benefit. No new injury reported. Pt reported pain worsened after she stopped coming to therapy. Pt did have an injury of neck when she fell in the shower 2-3 years ago and saw Chiropractor; not recently. Pt reports L side of neck is worst but right side also hurts. Pt reports intermittent N/T in both hands; had gone away when she was  "getting traction, but has now returned. No new tests reported; see imaging below for prior neck X-ray and MRI.  Pt reports occasional HA. Pt referred back to PT for continued PT for neck pain; including cervical traction.  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  -LN      Patient seeing anyone else for problem(s)? Dr. Rodriguez  -LN      How has patient tried to help current problem? ice/MH/Ibuprofen; rest; prior PT with ICTX; stretching/ROM exercises -LN      What clinical tests have you had for this problem? X-ray;MRI  -LN      Results of Clinical Tests X-ray of neck- Evidence for degenerative change around the C5-6 level; An acute osseous abnormality is not appreciated. 2.Evidence for some degenerative change as noted above. MRI of neck in 2023= At C2-C3, no significant disc degeneration. Mild facet arthropathy. No spinal canal or foraminal compromise. At C3-C4, no significant disc degeneration. No facet arthropathy. No spinal canal or foraminal compromise. At C4-C5, disc desiccation and mild loss of disc height. Mild effacement of the ventral thecal sac. Mild narrowing of the foramina bilaterally. Moderate left facet arthropathy and mild right facet arthropathy. At C5-C6, disc desiccation and mild loss of disc height. Mild effacement of the ventral thecal sac. Mild facet arthropathy. Mild bilateral foraminal narrowing. At C6-C7, disc desiccation and mild loss of disc height. Mild facet arthropathy. No spinal canal or " "foraminal narrowing. At C7-T1, no significant disc degeneration. Mild facet arthropathy. No spinal canal or foraminal narrowing. Paravertebral soft tissues demonstrate no acute findings.  -LN      Related/Recent Hospitalizations No  -LN      Surgery/Hospitalization n/a  -LN      History of Previous Related Injuries neck injury from a fall in the shower 2-3 years ago  -LN         Pain     Pain Location Neck  -LN      Pain at Present 6  -LN      Pain at Best 3  -LN      Pain at Worst 8  -LN      Pain Frequency Constant/continuous  -LN      Pain Description Discomfort;Aching;Throbbing;Tightness  \"annoying\"  -LN      What Performance Factors Make the Current Problem(s) WORSE? cleaning house; at end of school day; sitting at work/being on computer all day; lying down  -LN      What Performance Factors Make the Current Problem(s) BETTER? ice and heat; Ibuprofen; elevating L or R arm and resting it on something will help relieve neck pain; bending knee helps relieve neck pain  -LN      Tolerance Time- Sitting limited; sitting too long causes increased neck pain  -LN      Tolerance Time- Lying limited due to neck pain  -LN      Is your sleep disturbed? Yes  -LN      What position do you sleep in? Right sidelying;Left sidelying  mostly on R side  -LN      Difficulties at work? still working; some increased discomfort with sitting and when on computer.  -LN      Difficulties with recreational activities? yes; still limited with walking her dog  -LN         Fall Risk Assessment    Any falls in the past year: Yes  -LN      Number of falls reported in the last 12 months 1  -LN      Factors that contributed to the fall: Slippery surface  water on floor at work  -LN         Services    Prior Rehab/Home Health Experiences Yes  -LN      When was the prior experience with Rehab/Home Health 2019 and 2021 after L knee surgeries; March-August 2024 for neck and L knee pain  -LN      Where was the prior experience with Rehab/Home Health " [facility]  -LN      Are you currently receiving Home Health services No  -LN      Do you plan to receive Home Health services in the near future No  -LN         Daily Activities    Primary Language English  -LN      Are you able to read Yes  -LN      Are you able to write Yes  -LN      How does patient learn best? Other (comment)  not stated  -LN      Teaching needs identified Home Exercise Program;Other (comment);Management of Condition  Risks and benefits of treatment explained to pt.  -LN      Patient is concerned about/has problems with Performing home management (household chores, shopping, care of dependents);Performing job responsibilities/community activities (work, school,;Bed Mobility;Flexibility;Grasping objects lifting;Performing sports, recreation, and play activities;Sitting  -LN      Does patient have problems with the following? Anxiety  -LN      Barriers to learning None  -LN      Pt Participated in POC and Goals Yes  -LN         Safety    Are you being hurt, hit, or frightened by anyone at home or in your life? No  -LN      Are you being neglected by a caregiver No  -LN      Have you had any of the following issues with Anxiety  -LN                User Key  (r) = Recorded By, (t) = Taken By, (c) = Cosigned By      Initials Name Provider Type    Bren Rodriguez, PT Physical Therapist                     PT Ortho       Row Name 01/02/25 0800       Subjective    Subjective Comments Pt reports her neck was doing better when she was coming to therapy and had ICTX but after she stopped coming; her pain became worse; she reports she has continued her stretching exercises and using heat and ice. Pt also complaining of intermittent N/T in both hands again as well. She does get relief with putting her hands on top of her head; lefty L one. Pain is worse L side of neck but has pain bilaterally. Reports occasional HA.  -LN       Precautions and Contraindications     "Precautions/Limitations no known precautions/limitations  -LN       Subjective Pain    Able to rate subjective pain? yes  -LN    Pre-Treatment Pain Level 6  -LN    Subjective Pain Comment neck  -LN       Posture/Observations    Forward Head Mild  -LN    Cervical Lordosis Mild;Decreased;Sitting posture  -LN    Rounded Shoulders Bilateral:;Mild  -LN       Cervical Palpation    Cervical Facets Tender  -LN    Scalenes Bilateral:;Tender;Guarded/taut  L>R  -LN    Spinous Process Tender  lefty C6 and T1-4  -LN    Levator Scapula Bilateral:;Tender;Guarded/taut  L>R  -LN    Upper Traps Bilateral:;Tender;Guarded/taut  L>R  -LN    Middle Traps Bilateral:;Tender;Guarded/taut  L>R  -LN    Rhomboids Bilateral:;Tender;Guarded/taut  L>R  -LN       Cervical/Thoracic Special Tests    Cervical Compression (Forarminal Compression vs. Facet Pain) Negative  -LN    Cervical Distraction (Foraminal Compression vs. Facet Pain) Positive  reports relief  -LN    Bakody/Shoulder Abduction Sign (Cervical Radiculopathy) Bilateral:;Positive  some relief of pain  -LN       General ROM    GENERAL ROM COMMENTS B shoulder AROM WFL-WNL all planes  -LN       Head/Neck/Trunk    Neck Extension AROM WFL- \"pinches\"  -LN    Neck Flexion AROM WFL- painfree  -LN    Neck Lt Lateral Flexion AROM 50%- pain & tightness  -LN    Neck Rt Lateral Flexion AROM 25% pain & tightness  -LN    Neck Lt Rotation AROM 75%- pain and tight  -LN    Neck Rt Rotation AROM 75%- pain and tight  -LN       MMT (Manual Muscle Testing)    General MMT Comments B shoulder strength 5/5.  does report some weakness in her hands when they go numb  -LN       Sensation    Sensation WNL? WNL  -LN    Light Touch No apparent deficits  -LN    Additional Comments but does report occasional N/T in B hands  -LN       Upper Extremity Flexibility    Suboccipitals Bilateral:;Mildly limited  -LN    Scalenes Left:;Moderately limited;Right:;Mildly limited  -LN    Upper Trapezius Bilateral:;Moderately limited  " -LN    Levator Scapula Right:;Mildly limited;Left:;Moderately limited  -LN    Pect Minor Bilateral:;WNL  -LN       Transfers    Sit-Stand Stanley (Transfers) independent  -LN    Stand-Sit Stanley (Transfers) independent  -LN    Transfers, Sit-Stand-Sit, Assist Device other (see comments)  no AD used  -LN       Gait/Stairs (Locomotion)    Stanley Level (Gait) independent  -LN    Assistive Device (Gait) other (see comments)  no AD used  -LN    Comment, (Gait/Stairs) Pt is independent with all functional mobility and gait; no AD used. Pt with occasional limp on L due to knee pain.  -LN              User Key  (r) = Recorded By, (t) = Taken By, (c) = Cosigned By      Initials Name Provider Type    Bren Rodriguez, PT Physical Therapist                                Therapy Education  Education Details: Pt to continue with prior HEP daily and use MH/CP PRN; she can also use her home TENS unit as needed (did give her 1 pack of electrodes to use at home). Instructed pt where to apply the electrodes.  Given: HEP, Symptoms/condition management, Pain management, Posture/body mechanics  Program: Reinforced  How Provided: Verbal  Provided to: Patient  Level of Understanding: Verbalized      PT OP Goals       Row Name 01/02/25 0900          PT Short Term Goals    STG Date to Achieve 01/16/25  -LN     STG 1 Pt to verbally report decreased neck pain to <5/10 with ADLs and everyday activities.  -LN     STG 2 Pt independent with initial HEP issued by therapist.  -LN     STG 3 CROM improved by 25% to allow her to turn her neck better with driving.  -LN     STG 4 Muscle guarding decreased to minimal B UT/levator/MT/rhomboids/scalenes.  -LN     STG 5 Pt to report decreased N/T in B hands by 50%.  -LN     STG 6 Pt to report improved tolerance to sitting at work on the computer with decreased neck pain reported.  -LN        Long Term Goals    LTG Date to Achieve 01/30/25  -LN     LTG 1 Pt to verbally report  decreased neck pain to <3/10 with ADLs and everyday activities (including work/computer activities).  -LN     LTG 2 CROM WFL & painfree all planes.  -LN     LTG 3 Muscle guarding decreased to nominal B UT/levator/MT/rhomboids/scalenes.  -LN     LTG 4 Pt to no longer report any N/T in hands.  -LN     LTG 5 Pt independent with more advanced HEP issued by therapist.  -LN        Time Calculation    PT Goal Re-Cert Due Date 01/30/25  -LN               User Key  (r) = Recorded By, (t) = Taken By, (c) = Cosigned By      Initials Name Provider Type    LN Bren Shepherd, PT Physical Therapist                     PT Assessment/Plan       Row Name 01/02/25 0900          PT Assessment    Functional Limitations Limitation in home management;Limitations in community activities;Limitations in functional capacity and performance;Performance in leisure activities;Performance in self-care ADL;Performance in work activities  -LN     Impairments Impaired flexibility;Muscle strength;Pain;Posture;Range of motion;Sensation  -LN     Assessment Comments Pt presents with hx of neck injury after a fall in her shower 2-3 yrs ago with recent fall at work in March 2024 (she slipped on some water on the floor) which bossman her neck.  She reports her neck had been doing good up until recent fall; she did come to PT from April to August 2024 with benefit lefty from cervical traction; she reported neck pain has progressively worsened since she stopped coming to PT in August. Pt with c/o constant neck pain and tightness, L>R with occasional N/T in B hands and occasional SANTIAGO.  Pt presents with decreased CROM,decreased flexibility in neck/B UT with minimal to moderate mm guarding palpated lefty in L UT; cervical PS, and MT/rhomboids (multiple trigger points palpated); pt with decreased tolerance to sitting & bed mobility and reports disturbed sleep. Pt with decreased tolerance to home/community/work (sitting and being on computer all day) and  leisure activities due to above. Pt with continued sx of cervical strain with L sided facet joint syndrome. Pt also with sx of possible disc involvement (bulge vs herniataion) with nerve impingement. Prior MRI in 2023 showed disc dessication and mild loss of disc height at C4, C5; C5, C6; and C6, C7 levels. Feel she may benefit from another MRI if her sx persist or worsen to check for further disc damage since fall last year.  -LN     Please refer to paper survey for additional self-reported information Yes  -LN     Rehab Potential Good  but guarded due to pain being chronic now  -LN     Patient/caregiver participated in establishment of treatment plan and goals Yes  -LN     Patient would benefit from skilled therapy intervention Yes  -LN        PT Plan    PT Frequency 1x/week;2x/week  -LN     Predicted Duration of Therapy Intervention (PT) 4-6 weeks  -LN     Planned CPT's? PT EVAL LOW COMPLEXITY: 82560;PT RE-EVAL: 38372;PT THER PROC EA 15 MIN: 86518;PT MANUAL THERAPY EA 15 MIN: 32947;PT HOT OR COLD PACK TREAT MCARE;PT ELECTRICAL STIM UNATTEND: ;PT ULTRASOUND EA 15 MIN: 80580;PT TRACTION CERVICAL: 08303  -LN     Physical Therapy Interventions (Optional Details) home exercise program;manual therapy techniques;modalities;patient/family education;postural re-education;ROM (Range of Motion);strengthening;stretching;taping;dry needling  -LN     PT Plan Comments See pt 1-2 x week for therapeutic exercises with HEP; modalities PRN (IFC/MH/CP/US);manual therapy, mechanical ICTX; pt and posture education; kinesiotape PRN; consider trial of dry needling. Assess benefit of addition of IFC to MHP.  -LN               User Key  (r) = Recorded By, (t) = Taken By, (c) = Cosigned By      Initials Name Provider Type    LN Bren Shepherd, PT Physical Therapist                     Modalities       Row Name 01/02/25 0900             Moist Heat    MH Applied Yes  -LN      Location Cervical spine & back with IFC with pt  supine in hooklying.  -LN      PT Moist Heat Minutes --  15  -LN      MH Prior to Rx Yes  prior to ICTX  -LN      MH S/P Rx --  -LN         ELECTRICAL STIMULATION    Attended/Unattended Unattended  -LN      Stimulation Type IFC  -LN      Location/Electrode Placement/Other B UT and MT with MH.  -LN      PT E-Stim Unattended Minutes 15  -LN         Traction 51495    Traction Type Cervical  with MH across shoulders  -LN      PT Traction Rx Minutes 15  -LN      Duration Intermittent  -LN      Position Hook-lying  -LN      Weight 15  -LN      Hold 60  -LN      Relax 20  -LN                User Key  (r) = Recorded By, (t) = Taken By, (c) = Cosigned By      Initials Name Provider Type    LN Bren Shepherd, PT Physical Therapist                   OP Exercises       Row Name 01/02/25 0800             Precautions    Existing Precautions/Restrictions no known precautions/restrictions  -LN         Subjective    Subjective Comments Pt reports her neck was doing better when she was coming to therapy and had ICTX but after she stopped coming; her pain became worse; she reports she has continued her stretching exercises and using heat and ice. Pt also complaining of intermittent N/T in both hands again as well. She does get relief with putting her hands on top of her head; lefty L one. Pain is worse L side of neck but has pain bilaterally. Reports occasional HA.  -LN         Subjective Pain    Able to rate subjective pain? yes  -LN      Pre-Treatment Pain Level 6  -LN      Subjective Pain Comment neck  -LN         Total Minutes    57355 - PT Therapeutic Exercise Minutes 5  -LN         Exercise 1    Exercise Name 1 verbally reviewed prior HEP/neck stretches (cervical SB/UT stretch; scapular retraction; corner stretches; levator scapula stretch). Pt to continue with HEP daily as tolerated.  -LN                User Key  (r) = Recorded By, (t) = Taken By, (c) = Cosigned By      Initials Name Provider Type    LN Bren Shepherd  Lizeth, PT Physical Therapist                                  Outcome Measure Options: Neck Disability Index (NDI)  Neck Disability Index  Section 1 - Pain Intensity: The pain is moderate and does not vary much.  Section 2 - Personal Care: I can look after myself normally, but it causes extra pain.  Section 3 - Lifting: Pain prevents me from lifting heavy weights off the floor but I can if they are conveniently positioned, for example on a table.  Section 4 - Reading: I cannot read as much as I want because of moderate neck pain.  Section 5 - Headaches: I have slight headaches that come infrequently.  Section 6 - Concentration: I have a fair degree of difficulty in concentrating when I want to.  Section 7 - Work: I can only do my usual work, but no more  Section 8 - Driving: I cannot drive my car as long as I want because of moderate neck pain.  Section 9 - Sleeping: My sleep is mildly disturbed (1-2 hours sleepless).  Section 10 - Recreation: I am able to engage in most but not all recreational activities because of pain in my neck.  Neck Disability Index Score: 20      Time Calculation:     Start Time: 0858  Stop Time: 1005  Time Calculation (min): 67 min  Timed Charges  21486 - PT Therapeutic Exercise Minutes: 5  Untimed Charges  PT Traction Rx Minutes: 15  PT E-Stim Unattended Minutes: 15  PT Moist Heat Minutes:  (15)  Total Minutes  Timed Charges Total Minutes: 5  Untimed Charges Total Minutes: 30   Total Minutes: 30     Therapy Charges for Today       Code Description Service Date Service Provider Modifiers Qty    [id number] HC PT EVAL LOW COMPLEXITY 4 1/2/2025 Bren Shepherd, PT GP 1            PT G-Codes  Outcome Measure Options: Neck Disability Index (NDI)  Neck Disability Index Score: 20         Bren Shepherd, PT  1/2/2025

## 2025-01-08 ENCOUNTER — APPOINTMENT (OUTPATIENT)
Dept: PHYSICAL THERAPY | Facility: HOSPITAL | Age: 55
End: 2025-01-08
Payer: OTHER MISCELLANEOUS

## 2025-01-08 ENCOUNTER — DOCUMENTATION (OUTPATIENT)
Dept: PHYSICAL THERAPY | Facility: HOSPITAL | Age: 55
End: 2025-01-08
Payer: COMMERCIAL

## 2025-01-14 ENCOUNTER — DOCUMENTATION (OUTPATIENT)
Dept: PHYSICAL THERAPY | Facility: HOSPITAL | Age: 55
End: 2025-01-14
Payer: COMMERCIAL

## 2025-01-14 ENCOUNTER — APPOINTMENT (OUTPATIENT)
Dept: PHYSICAL THERAPY | Facility: HOSPITAL | Age: 55
End: 2025-01-14
Payer: OTHER MISCELLANEOUS

## 2025-01-28 ENCOUNTER — HOSPITAL ENCOUNTER (OUTPATIENT)
Dept: PHYSICAL THERAPY | Facility: HOSPITAL | Age: 55
Setting detail: THERAPIES SERIES
Discharge: HOME | End: 2025-01-28
Payer: OTHER MISCELLANEOUS

## 2025-01-28 DIAGNOSIS — M54.2 NECK PAIN: Primary | ICD-10-CM

## 2025-01-28 PROCEDURE — 97012 MECHANICAL TRACTION THERAPY: CPT

## 2025-01-28 PROCEDURE — G0283 ELEC STIM OTHER THAN WOUND: HCPCS

## 2025-01-28 NOTE — THERAPY TREATMENT NOTE
Outpatient Physical Therapy Ortho Treatment Note  ALEXANDER Haile     Patient Name: Jocelyn Ríos  : 1970  MRN: [id number]  Today's Date: 2025        Visit Date: 2025    Visit Dx:    ICD-10-CM ICD-9-CM   1. Neck pain  M54.2 723.1       Patient Active Problem List   Diagnosis    Anxiety    Moderate persistent asthma without complication    Environmental allergies    Routine adult health maintenance    Complex regional pain syndrome i of left lower limb    Carpal tunnel syndrome on both sides    Status post arthroscopic surgery of left knee, DOS 10/21/2019    Intractable neuropathic pain of left knee    Chronic cervical pain    Chondromalacia of knee, left    Screening mammogram for breast cancer    Moderate mixed hyperlipidemia not requiring statin therapy        Past Medical History:   Diagnosis Date    Acute sinus infection     on poab    Anxiety     Arthritis     Asthma     STARTED @ 41 YRS OLD, DR. BUENO, & [facility] ALLERGY DR. LOPEZ    COVID-19     2021    CRPS (complex regional pain syndrome) type I of lower limb     Elevated cholesterol     Frequent UTI     GERD (gastroesophageal reflux disease)     History of 2019 novel coronavirus disease (COVID-19)     Iliotibial band syndrome affecting left lower leg     Itching     BLE has some scratches to notify MD if doesn't clear up prior to surgery    Kidney stones     Lateral collateral ligament deficiency of left knee 2016    Lateral meniscal tear     Lumbar paraspinal muscle spasm 2017    Meniscal cyst, left 2019    Migraines     Patellofemoral pain syndrome of left knee 2019    PONV (postoperative nausea and vomiting)     Seasonal allergies     Subchondral insufficiency fracture of femoral condyle, left, initial encounter 01/10/2020    Tear of lateral meniscus of left knee, current 2019        Past Surgical History:   Procedure Laterality Date     SECTION      x2    DIAGNOSTIC  "LAPAROSCOPY      FOOT SURGERY Right     HERNIA REPAIR Bilateral     inguinal hernia age 6    INTRAUTERINE DEVICE INSERTION      KNEE ARTHROSCOPY Left 10/21/2016    Procedure: KNEE ARTHROSCOPY debridement of lateral meniscal tear;  Surgeon: Son Mojica MD;  Location:  [facility];  Service:     KNEE ARTHROSCOPY Left 7/27/2018    Procedure: KNEE ARTHROSCOPY;  Surgeon: Son Mojica MD;  Location:  [facility];  Service: Orthopedics    KNEE ARTHROSCOPY Left 12/2/2021    Procedure: LEFT KNEE DIAGNOSTIC  ARTHROSCOPY AND PARTLATERAL MENISECTOMY AND REMOVAL OF MULTIPLE FIXATION DEVICES;  Surgeon: Rodríguez Hamlin MD;  Location:  [facility];  Service: Orthopedics;  Laterality: Left;    KNEE MENISCAL REPAIR Left 10/21/2019    Procedure: knee arthroscopy, debridement of meniscal cyst, and lateral meniscal repair;  Surgeon: Bridger Figueroa MD;  Location:  [facility];  Service: Orthopedics    NERVE BLOCK Left 5/25/2021    Procedure: KNEE GENICULAR NERVE BLOCK UNDER FLUORO- x2, 3-4 wks apart;  Surgeon: James Spivey MD;  Location: [facility] OR;  Service: Pain Management;  Laterality: Left;        PT Ortho       Row Name 01/28/25 1500       Subjective    Subjective Comments Pt reports her neck has been much better. \"It's not that I am not uncomfortable, but it is different than it was & has been better since I had the last traction.\"  No c/o any N/T in hands. She does report occasional sharp pains in neck. \"I think the Estim helped and I have been trying to use my home unit.\"  -LN       Precautions and Contraindications    Precautions/Limitations no known precautions/limitations  -LN       Subjective Pain    Able to rate subjective pain? yes  -LN    Pre-Treatment Pain Level 4  -LN    Subjective Pain Comment neck  -LN       Head/Neck/Trunk    Neck Extension AROM WFL- \"pinches\"  -LN    Neck Flexion AROM WFL- mild pain/pull  -LN    Neck Lt Lateral Flexion AROM 50%- pain & tightness  -LN    Neck Rt Lateral Flexion AROM 25% pain " & tightness  -LN    Neck Lt Rotation AROM 75%- pain and tight  -LN    Neck Rt Rotation AROM 75%- pain and tight  -LN              User Key  (r) = Recorded By, (t) = Taken By, (c) = Cosigned By      Initials Name Provider Type    Bren Rodriguez, PT Physical Therapist                                 PT Assessment/Plan       Row Name 01/28/25 1600          PT Assessment    Assessment Comments Pt continues with persistent neck pain but overall less since begininng PT with traction. She also reports benefit from IFC & MH. She is doing well with HEP. Pt still with limited B cervical SB, R>L due to pain and tightness.  -LN        PT Plan    PT Frequency 1x/week;2x/week  -LN     PT Plan Comments Continue per POC; assess benefit of increased weight with traction. IFC/MH PRN. Progress exercises as tolerated.  -LN               User Key  (r) = Recorded By, (t) = Taken By, (c) = Cosigned By      Initials Name Provider Type    Bren Rodriguez, PT Physical Therapist                     Modalities       Row Name 01/28/25 1500             Precautions    Existing Precautions/Restrictions no known precautions/restrictions  -LN         Moist Heat    MH Applied Yes  -LN      Location Cervical spine & back with IFC with pt supine in hooklying.  -LN      PT Moist Heat Minutes --  15  -LN      MH Prior to Rx Yes  prior to ICTX  -LN         ELECTRICAL STIMULATION    Attended/Unattended Unattended  -LN      Stimulation Type IFC  -LN      Location/Electrode Placement/Other B UT and levator scapulae with MH.  -LN      PT E-Stim Unattended Minutes 15  -LN         Traction 71932    Traction Type Cervical  with MH across shoulders  -LN      PT Traction Rx Minutes 15  -LN      Duration Intermittent  -LN      Position Hook-lying  -LN      Weight 17  -LN      Hold 60  -LN      Relax 20  -LN                User Key  (r) = Recorded By, (t) = Taken By, (c) = Cosigned By      Initials Name Provider Type    Bren Rodriguez  "Lizeth, PT Physical Therapist                   OP Exercises       Row Name 01/28/25 1500             Precautions    Existing Precautions/Restrictions no known precautions/restrictions  -LN         Subjective    Subjective Comments Pt reports her neck has been much better. \"It's not that I am not uncomfortable, but it is different than it was & has been better since I had the last traction.\"  No c/o any N/T in hands. She does report occasional sharp pains in neck. \"I think the Estim helped and I have been trying to use my home unit.\"  -LN         Subjective Pain    Able to rate subjective pain? yes  -LN      Pre-Treatment Pain Level 4  -LN      Subjective Pain Comment neck  -LN         Exercise 1    Exercise Name 1 verbally reviewed prior HEP/neck stretches (cervical SB/UT stretch; scapular retraction; corner stretches; levator scapula stretch). Pt to continue with HEP daily as tolerated.  -LN                User Key  (r) = Recorded By, (t) = Taken By, (c) = Cosigned By      Initials Name Provider Type    LN Bren Shephred, PT Physical Therapist                                     Therapy Education  Education Details: Pt to continue with daily HEP as tolerated and use MH/CP/home TENS unit PRN.  Given: HEP, Symptoms/condition management, Pain management, Posture/body mechanics  Program: Reinforced  How Provided: Verbal  Provided to: Patient  Level of Understanding: Verbalized              Time Calculation:   Start Time: 1600  Stop Time: 1700  Time Calculation (min): 60 min  Untimed Charges  PT Traction Rx Minutes: 15  PT E-Stim Unattended Minutes: 15  PT Moist Heat Minutes:  (15)  Total Minutes  Untimed Charges Total Minutes: 30   Total Minutes: 30  Therapy Charges for Today       Code Description Service Date Service Provider Modifiers Qty    76810289777 HC PT ELECTRICAL STIM UNATTENDED 1/28/2025 Bren Shepherd, PT  1    78329367244 HC PT TRACTION CERVICAL 1/28/2025 Bren Shepherd, PT GP " 1                      Bren Shepherd, PT  1/28/2025

## 2025-01-29 ENCOUNTER — OFFICE VISIT (OUTPATIENT)
Dept: OBSTETRICS AND GYNECOLOGY | Facility: CLINIC | Age: 55
End: 2025-01-29
Payer: COMMERCIAL

## 2025-01-29 VITALS
HEIGHT: 57 IN | WEIGHT: 112 LBS | SYSTOLIC BLOOD PRESSURE: 98 MMHG | BODY MASS INDEX: 24.16 KG/M2 | DIASTOLIC BLOOD PRESSURE: 60 MMHG

## 2025-01-29 DIAGNOSIS — Z01.419 CERVICAL SMEAR, AS PART OF ROUTINE GYNECOLOGICAL EXAMINATION: ICD-10-CM

## 2025-01-29 DIAGNOSIS — Z01.419 ROUTINE GYNECOLOGICAL EXAMINATION: Primary | ICD-10-CM

## 2025-01-29 DIAGNOSIS — Z11.51 SPECIAL SCREENING EXAMINATION FOR HUMAN PAPILLOMAVIRUS (HPV): ICD-10-CM

## 2025-01-29 LAB
BILIRUB BLD-MCNC: NEGATIVE MG/DL
CLARITY, POC: CLEAR
COLOR UR: YELLOW
GLUCOSE UR STRIP-MCNC: NEGATIVE MG/DL
KETONES UR QL: NEGATIVE
LEUKOCYTE EST, POC: ABNORMAL
NITRITE UR-MCNC: NEGATIVE MG/ML
PH UR: 5 [PH] (ref 5–8)
PROT UR STRIP-MCNC: ABNORMAL MG/DL
RBC # UR STRIP: ABNORMAL /UL
SP GR UR: 1.01 (ref 1–1.03)
UROBILINOGEN UR QL: NORMAL

## 2025-01-29 NOTE — PROGRESS NOTES
GYN Annual Exam     CC- Here for annual exam.     Jocelyn Ríos is a 54 y.o. female who presents for annual well woman exam. Periods are absent.     OB History          2    Para   2    Term   2            AB        Living   2         SAB        IAB        Ectopic        Molar        Multiple        Live Births   2                Current contraception: post menopausal status  History of abnormal Pap smear: no  Family history of uterine, colon or ovarian cancer: no  History of abnormal mammogram: no  Family history of breast cancer: no  Last Pap :  N/N    Past Medical History:   Diagnosis Date    Acute sinus infection     on poab    Anxiety     Arthritis     Asthma     STARTED @ 41 YRS OLD, DR. BUENO, & [facility] ALLERGY DR. LOPEZ    COVID-19     2021    CRPS (complex regional pain syndrome) type I of lower limb     Elevated cholesterol     Frequent UTI     GERD (gastroesophageal reflux disease)     History of  novel coronavirus disease (COVID-19)     Iliotibial band syndrome affecting left lower leg     Itching     BLE has some scratches to notify MD if doesn't clear up prior to surgery    Kidney stones     Lateral collateral ligament deficiency of left knee 2016    Lateral meniscal tear     Lumbar paraspinal muscle spasm 2017    Meniscal cyst, left 2019    Migraines     Patellofemoral pain syndrome of left knee 2019    PONV (postoperative nausea and vomiting)     Seasonal allergies     Subchondral insufficiency fracture of femoral condyle, left, initial encounter 01/10/2020    Tear of lateral meniscus of left knee, current 2019       Past Surgical History:   Procedure Laterality Date     SECTION      x2    DIAGNOSTIC LAPAROSCOPY      FOOT SURGERY Right     HERNIA REPAIR Bilateral     inguinal hernia age 6    INTRAUTERINE DEVICE INSERTION      KNEE ARTHROSCOPY Left 10/21/2016    Procedure: KNEE ARTHROSCOPY debridement of lateral  meniscal tear;  Surgeon: Son Mojica MD;  Location:  [facility];  Service:     KNEE ARTHROSCOPY Left 7/27/2018    Procedure: KNEE ARTHROSCOPY;  Surgeon: Son Mojica MD;  Location:  [facility];  Service: Orthopedics    KNEE ARTHROSCOPY Left 12/2/2021    Procedure: LEFT KNEE DIAGNOSTIC  ARTHROSCOPY AND PARTLATERAL MENISECTOMY AND REMOVAL OF MULTIPLE FIXATION DEVICES;  Surgeon: Rodríguez Hamlin MD;  Location:  [facility];  Service: Orthopedics;  Laterality: Left;    KNEE MENISCAL REPAIR Left 10/21/2019    Procedure: knee arthroscopy, debridement of meniscal cyst, and lateral meniscal repair;  Surgeon: Bridger Figueroa MD;  Location:  [facility];  Service: Orthopedics    NERVE BLOCK Left 5/25/2021    Procedure: KNEE GENICULAR NERVE BLOCK UNDER FLUORO- x2, 3-4 wks apart;  Surgeon: James Spivey MD;  [place] [facility] [place];  Service: Pain Management;  Laterality: Left;         Current Outpatient Medications:     albuterol sulfate  (90 Base) MCG/ACT inhaler, Inhale 2 puffs Every 4 (Four) Hours As Needed for Wheezing. ProAir  (90 Base) MCG/ACT Inhalation Aerosol Solution; Patient Sig: ProAir  (90 Base) MCG/ACT Inhalation Aerosol Solution INL 2-4 PFS Q 4 H PRN; 8; 0; 30-Nov-2012; Active, Disp: 8 g, Rfl: 3    budesonide-formoterol (Symbicort) 160-4.5 MCG/ACT inhaler, Inhale 2 puffs 2 (Two) Times a Day., Disp: 10.2 g, Rfl: 3    cetirizine (zyrTEC) 10 MG tablet, Take 1 tablet by mouth Daily., Disp: , Rfl:     citalopram (CeleXA) 20 MG tablet, Take 1 tablet by mouth Daily. Indications: Generalized Anxiety Disorder, Disp: 90 tablet, Rfl: 0    Ryaltris 665-25 MCG/ACT suspension, Administer 2 sprays into the nostril(s) as directed by provider 2 (Two) Times a Day., Disp: , Rfl:     Allergies   Allergen Reactions    Meloxicam Rash and GI Intolerance     And HIVES , vomits    Shellfish-Derived Products Swelling and Angioedema       Social History     Tobacco Use    Smoking status: Never    Smokeless  "tobacco: Never   Vaping Use    Vaping status: Never Used   Substance Use Topics    Alcohol use: Yes     Comment: 1-2 glasses monthly    Drug use: Never         Family History   Problem Relation Age of Onset    Heart disease Mother         ?    Cancer Father         ?    No Known Problems Sister         unknown whereabouts    No Known Problems Brother         unknown whereabouts    ADD / ADHD Son     No Known Problems Son     Vasile Hyperthermia Neg Hx     Breast cancer Neg Hx        Review of Systems   Constitutional:  Negative for appetite change, fever and unexpected weight change.   HENT:  Negative for congestion and sore throat.    Respiratory:  Negative for cough and shortness of breath.    Cardiovascular:  Negative for chest pain and palpitations.   Gastrointestinal:  Negative for abdominal distention, abdominal pain, constipation, diarrhea, nausea and vomiting.   Endocrine: Negative.    Genitourinary:  Negative for dyspareunia, menstrual problem, pelvic pain and vaginal discharge.   Skin: Negative.    Neurological:  Negative for dizziness and syncope.   Hematological: Negative.    Psychiatric/Behavioral:  Negative for dysphoric mood and sleep disturbance. The patient is not nervous/anxious.      BP 98/60   Ht 144.8 cm (57\")   Wt 50.8 kg (112 lb)   LMP  (LMP Unknown)   BMI 24.24 kg/m²     Physical Exam  Vitals and nursing note reviewed. Exam conducted with a chaperone present.   Constitutional:       Appearance: She is well-developed.   HENT:      Head: Normocephalic and atraumatic.   Neck:      Thyroid: No thyromegaly.   Cardiovascular:      Rate and Rhythm: Normal rate and regular rhythm.   Pulmonary:      Effort: Pulmonary effort is normal.      Breath sounds: Normal breath sounds.   Chest:   Breasts:     Breasts are symmetrical.      Right: No mass or nipple discharge.      Left: No mass or nipple discharge.   Abdominal:      General: Bowel sounds are normal. There is no distension.      Palpations: " Abdomen is soft. There is no mass.      Tenderness: There is no abdominal tenderness. There is no guarding or rebound.   Genitourinary:     General: Normal vulva.      Exam position: Supine.      Labia:         Right: No lesion.         Left: No lesion.       Vagina: Normal.      Cervix: No cervical motion tenderness or discharge.      Uterus: Normal.       Adnexa:         Right: No mass.          Left: No mass.     Musculoskeletal:         General: Normal range of motion.      Cervical back: Normal range of motion and neck supple.   Skin:     General: Skin is warm and dry.   Neurological:      Mental Status: She is alert and oriented to person, place, and time.   Psychiatric:         Behavior: Behavior normal.         Thought Content: Thought content normal.         Judgment: Judgment normal.     Diagnoses and all orders for this visit:    1. Routine gynecological examination (Primary)  -     POC Urinalysis Dipstick  -     IGP, Apt HPV,rfx 16 / 18,45    2. Cervical smear, as part of routine gynecological examination  -     IGP, Apt HPV,rfx 16 / 18,45    3. Special screening examination for human papillomavirus (HPV)  -     IGP, Apt HPV,rfx 16 / 18,45        Assessment     1) GYN annual well woman exam.   2) MMG already scheduled     Plan     1) Breast Health - Clinical breast exam & mammogram yearly, Self breast awareness monthly  2) Pap - done today  3) Smoking status - Jocelyn YANEZ Jericamyrnabrayden  reports that she has never smoked. She has never used smokeless tobacco.   4) Follow up prn and one year    Encounter Diagnoses   Name Primary?    Routine gynecological examination Yes    Cervical smear, as part of routine gynecological examination     Special screening examination for human papillomavirus (HPV)          Iraj Lackey MD  1/29/2025  15:50 EST

## 2025-02-03 LAB
CYTOLOGIST CVX/VAG CYTO: ABNORMAL
CYTOLOGY CVX/VAG DOC CYTO: ABNORMAL
CYTOLOGY CVX/VAG DOC THIN PREP: ABNORMAL
DX ICD CODE: ABNORMAL
HPV I/H RISK 4 DNA CVX QL PROBE+SIG AMP: POSITIVE
HPV16 DNA CVX QL PROBE+SIG AMP: NEGATIVE
HPV18+45 E6+E7 MRNA CVX QL NAA+PROBE: NEGATIVE
Lab: ABNORMAL
OTHER STN SPEC: ABNORMAL
STAT OF ADQ CVX/VAG CYTO-IMP: ABNORMAL

## 2025-02-05 ENCOUNTER — HOSPITAL ENCOUNTER (OUTPATIENT)
Dept: PHYSICAL THERAPY | Facility: HOSPITAL | Age: 55
Setting detail: THERAPIES SERIES
Discharge: HOME | End: 2025-02-05
Payer: OTHER MISCELLANEOUS

## 2025-02-05 DIAGNOSIS — M54.2 NECK PAIN: Primary | ICD-10-CM

## 2025-02-05 PROCEDURE — G0283 ELEC STIM OTHER THAN WOUND: HCPCS

## 2025-02-05 PROCEDURE — 97012 MECHANICAL TRACTION THERAPY: CPT

## 2025-02-05 NOTE — THERAPY TREATMENT NOTE
Outpatient Physical Therapy Ortho Treatment Note  ALEXANDER Haile     Patient Name: Jocelyn Ríos  : 1970  MRN: [id number]  Today's Date: 2025        Visit Date: 2025    Visit Dx:    ICD-10-CM ICD-9-CM   1. Neck pain  M54.2 723.1       Patient Active Problem List   Diagnosis    Anxiety    Moderate persistent asthma without complication    Environmental allergies    Routine adult health maintenance    Complex regional pain syndrome i of left lower limb    Carpal tunnel syndrome on both sides    Status post arthroscopic surgery of left knee, DOS 10/21/2019    Intractable neuropathic pain of left knee    Chronic cervical pain    Chondromalacia of knee, left    Screening mammogram for breast cancer    Moderate mixed hyperlipidemia not requiring statin therapy        Past Medical History:   Diagnosis Date    Acute sinus infection     on poab    Anxiety     Arthritis     Asthma     STARTED @ 41 YRS OLD, DR. BUENO, & \[facility]"" ALLERGY DR. LOPEZ    COVID-19     2021    CRPS (complex regional pain syndrome) type I of lower limb     Elevated cholesterol     Frequent UTI     GERD (gastroesophageal reflux disease)     History of 2019 novel coronavirus disease (COVID-19)     Iliotibial band syndrome affecting left lower leg     Itching     BLE has some scratches to notify MD if doesn't clear up prior to surgery    Kidney stones     Lateral collateral ligament deficiency of left knee 2016    Lateral meniscal tear     Lumbar paraspinal muscle spasm 2017    Meniscal cyst, left 2019    Migraines     Patellofemoral pain syndrome of left knee 2019    PONV (postoperative nausea and vomiting)     Seasonal allergies     Subchondral insufficiency fracture of femoral condyle, left, initial encounter 01/10/2020    Tear of lateral meniscus of left knee, current 2019        Past Surgical History:   Procedure Laterality Date     SECTION      x2    DIAGNOSTIC LAPAROSCOPY  "     FOOT SURGERY Right     HERNIA REPAIR Bilateral     inguinal hernia age 6    INTRAUTERINE DEVICE INSERTION      KNEE ARTHROSCOPY Left 10/21/2016    Procedure: KNEE ARTHROSCOPY debridement of lateral meniscal tear;  Surgeon: Son Mojica MD;  Location:  [facility];  Service:     KNEE ARTHROSCOPY Left 7/27/2018    Procedure: KNEE ARTHROSCOPY;  Surgeon: Son Mojica MD;  Location:  [facility];  Service: Orthopedics    KNEE ARTHROSCOPY Left 12/2/2021    Procedure: LEFT KNEE DIAGNOSTIC  ARTHROSCOPY AND PARTLATERAL MENISECTOMY AND REMOVAL OF MULTIPLE FIXATION DEVICES;  Surgeon: Rodríguez Hamlin MD;  Location:  [facility];  Service: Orthopedics;  Laterality: Left;    KNEE MENISCAL REPAIR Left 10/21/2019    Procedure: knee arthroscopy, debridement of meniscal cyst, and lateral meniscal repair;  Surgeon: Bridger Figueroa MD;  Location:  [facility];  Service: Orthopedics    NERVE BLOCK Left 5/25/2021    Procedure: KNEE GENICULAR NERVE BLOCK UNDER FLUORO- x2, 3-4 wks apart;  Surgeon: James Spivey MD;  Location: [facility];  Service: Pain Management;  Laterality: Left;        PT Ortho       Row Name 02/05/25 1500       Subjective    Subjective Comments Pt reports her neck has been better \"in the way that I can move it better but I still have the pressure feeling of my neck being compressed.\" \"I saw Dr. Rodriguez yesterday and he is going to release me soon and he isn't going to order anymore PT after these visits.\" \"My wrist is still bothering me so I have an appt. with the hand specialist 3/10/25.\"  She continues to report benefit from PT; lefty cervical traction.  -LN       Precautions and Contraindications    Precautions/Limitations no known precautions/limitations  -LN       Subjective Pain    Able to rate subjective pain? yes  -LN    Pre-Treatment Pain Level 5  -LN              User Key  (r) = Recorded By, (t) = Taken By, (c) = Cosigned By      Initials Name Provider Type    LN Bren Shepherd, PT Physical " Therapist                                 PT Assessment/Plan       Row Name 02/05/25 1500          PT Assessment    Assessment Comments Pt continues with persistent neck pain but overall less since beginning PT with traction & she reports she can move her neck better with less pain. She still reports feeling like her neck is compressed & the only thing that relieves that feeling is the cervical traction. She continues to do well with HEP and continues to also report benefit from IFC & MH.  -LN        PT Plan    PT Frequency 1x/week;2x/week  -LN     PT Plan Comments Continue per POC; cervical traction; IFC/MH PRN. Progress exercises as tolerated. Try chin tucks as tolerated. Talk to patient about home traction options.  -LN               User Key  (r) = Recorded By, (t) = Taken By, (c) = Cosigned By      Initials Name Provider Type    Bren Rodriguez, PT Physical Therapist                     Modalities       Row Name 02/05/25 1500             Moist Heat    MH Applied Yes  -LN      Location Cervical spine & back with IFC with pt supine in hooklying.  -LN      PT Moist Heat Minutes --  15  -LN      MH Prior to Rx Yes  prior to ICTX  -LN         ELECTRICAL STIMULATION    Attended/Unattended Unattended  -LN      Stimulation Type IFC  -LN      Location/Electrode Placement/Other B UT and levator scapulae with MH.  -LN      PT E-Stim Unattended Minutes 15  -LN         Traction 15050    Traction Type Cervical  with MH across shoulders  -LN      PT Traction Rx Minutes 15  -LN      Duration Intermittent  -LN      Position Hook-lying  -LN      Weight 17  -LN      Hold 60  -LN      Relax 20  -LN                User Key  (r) = Recorded By, (t) = Taken By, (c) = Cosigned By      Initials Name Provider Type    Bren Rodriguez, PT Physical Therapist                   OP Exercises       Row Name 02/05/25 1500             Precautions    Existing Precautions/Restrictions no known precautions/restrictions  -LN    "      Subjective    Subjective Comments Pt reports her neck has been better \"in the way that I can move it better but I still have the pressure feeling of my neck being compressed.\" \"I saw Dr. Rodriguez yesterday and he is going to release me soon and he isn't going to order anymore PT after these visits.\" \"My wrist is still bothering me so I have an appt. with the hand specialist 3/10/25.\"  She continues to report benefit from PT; lefty cervical traction.  -LN         Subjective Pain    Able to rate subjective pain? yes  -LN      Pre-Treatment Pain Level 5  -LN      Subjective Pain Comment neck- lefty with sitting too long and with abrupt movements.  -LN         Exercise 1    Exercise Name 1 verbally reviewed prior HEP/neck stretches (cervical SB/UT stretch; scapular retraction; corner stretches; levator scapula stretch). Pt to continue with HEP daily as tolerated.  -LN                User Key  (r) = Recorded By, (t) = Taken By, (c) = Cosigned By      Initials Name Provider Type    LN Bren Shepherd, PT Physical Therapist                                     Therapy Education  Education Details: Pt to continue with daily HEP as tolerated and use MH/CP/home TENS unit PRN.  Given: HEP, Symptoms/condition management, Pain management, Posture/body mechanics  Program: Reinforced  How Provided: Verbal  Provided to: Patient  Level of Understanding: Verbalized              Time Calculation:   Start Time: 1522  Stop Time: 1620  Time Calculation (min): 58 min  Untimed Charges  PT Traction Rx Minutes: 15  PT E-Stim Unattended Minutes: 15  PT Moist Heat Minutes:  (15)  Total Minutes  Untimed Charges Total Minutes: 30   Total Minutes: 30    Therapy Charges for Today       Code Description Service Date Service Provider Modifiers Qty    30488074090 HC PT ELECTRICAL STIM UNATTENDED 2/5/2025 Bren Shepherd, PT  1    47053871820 HC PT TRACTION CERVICAL 2/5/2025 Bren Shepherd, PT GP 1                      Bren" Lizeth Shepherd, PT  2/5/2025

## 2025-02-07 ENCOUNTER — HOSPITAL ENCOUNTER (OUTPATIENT)
Dept: MAMMOGRAPHY | Facility: HOSPITAL | Age: 55
Discharge: HOME | End: 2025-02-07
Admitting: FAMILY MEDICINE
Payer: COMMERCIAL

## 2025-02-07 DIAGNOSIS — Z12.31 BREAST CANCER SCREENING BY MAMMOGRAM: ICD-10-CM

## 2025-02-07 PROCEDURE — 77063 BREAST TOMOSYNTHESIS BI: CPT

## 2025-02-07 PROCEDURE — 77067 SCR MAMMO BI INCL CAD: CPT

## 2025-02-10 ENCOUNTER — OFFICE VISIT (OUTPATIENT)
Dept: FAMILY MEDICINE CLINIC | Facility: CLINIC | Age: 55
End: 2025-02-10
Payer: COMMERCIAL

## 2025-02-10 VITALS
OXYGEN SATURATION: 98 % | HEIGHT: 57 IN | WEIGHT: 112 LBS | DIASTOLIC BLOOD PRESSURE: 72 MMHG | SYSTOLIC BLOOD PRESSURE: 112 MMHG | HEART RATE: 90 BPM | TEMPERATURE: 97.8 F | BODY MASS INDEX: 24.16 KG/M2

## 2025-02-10 DIAGNOSIS — Z23 NEED FOR INFLUENZA VACCINATION: Primary | ICD-10-CM

## 2025-02-10 DIAGNOSIS — E78.2 MODERATE MIXED HYPERLIPIDEMIA NOT REQUIRING STATIN THERAPY: ICD-10-CM

## 2025-02-10 DIAGNOSIS — F41.9 ANXIETY: ICD-10-CM

## 2025-02-10 DIAGNOSIS — Z00.00 ROUTINE ADULT HEALTH MAINTENANCE: ICD-10-CM

## 2025-02-10 DIAGNOSIS — R82.90 ABNORMAL URINALYSIS: ICD-10-CM

## 2025-02-10 PROBLEM — B97.7 HPV IN FEMALE: Status: ACTIVE | Noted: 2025-02-10

## 2025-02-10 PROCEDURE — 90715 TDAP VACCINE 7 YRS/> IM: CPT | Performed by: FAMILY MEDICINE

## 2025-02-10 PROCEDURE — 99396 PREV VISIT EST AGE 40-64: CPT | Performed by: FAMILY MEDICINE

## 2025-02-10 PROCEDURE — 90471 IMMUNIZATION ADMIN: CPT | Performed by: FAMILY MEDICINE

## 2025-02-10 RX ORDER — CITALOPRAM HYDROBROMIDE 20 MG/1
20 TABLET ORAL DAILY
Qty: 90 TABLET | Refills: 4 | Status: SHIPPED | OUTPATIENT
Start: 2025-02-10

## 2025-02-10 NOTE — PROGRESS NOTES
"  Chief Complaint   Patient presents with    Annual Exam       Subjective   Jocelyn Mckeon is a 54 y.o. female and is here for a yearly physical exam. The patient reports no problems.    Do you take any herbs or supplements that were not prescribed by a doctor? no. If so, these will be added to active medication list.    The following portions of the patient's history were reviewed and updated as appropriate: allergies, current medications, past family history, past medical history, past social history, past surgical history, and problem list.    Social and Family and Surgical History reviewed and updated today.    Health and Surgical History, Preventive Measures and Vaccination flow sheets reviewed and updated today.    Patient's current medical chart in Epic; including previous office notes, Mychart messages, recent phone calls, imaging, labs, specialist's evaluation either in notes or in Media tab reviewed today.    Other outside pertinent medical information also reviewed thru Care Everywhere function is also reviewed today.    Review of Systems  Review of Systems  Pertinent items are noted in HPI.    Vitals:    02/10/25 1516   BP: 112/72   BP Location: Left arm   Patient Position: Sitting   Cuff Size: Adult   Pulse: 90   Temp: 97.8 °F (36.6 °C)   SpO2: 98%   Weight: 50.8 kg (112 lb)   Height: 144.8 cm (57\")     Body mass index is 24.24 kg/m².          General Appearance:  Alert, cooperative, no distress, appears stated age   Head:  Normocephalic, without obvious abnormality, atraumatic   Eyes:  PERRL, conjunctiva/corneas clear, EOM's intact.   Ears:  Normal TM's and external ear canals, both ears   Nose: Nares normal, septum midline, mucosa normal, no drainage or sinus tenderness   Throat: Lips, mucosa, and tongue normal; teeth and gums viewed   Neck: Supple, symmetrical,  no adenopathy;   thyroid: No enlargement/tenderness/nodules;   no carotid bruit   Back:  Symmetric, no curvature, ROM normal, no CVA " tenderness   Lungs:  Clear to auscultation bilaterally, respirations unlabored   Chest wall:  No tenderness or deformity   Heart:  Regular rate and rhythm, S1 and S2 normal, no murmur.   Abdomen:  Soft, non-tender, bowel sounds active all four quadrants,   no masses, no organomegaly   Rectal:        Extremities: Extremities normal, atraumatic, no cyanosis or edema   Pulses: 2+ and symmetric all extremities   Skin: Skin color, texture, turgor normal, no rashes. No suspicious lesions   Lymph nodes: Cervical, supraclavicular, and axillary nodes normal   Neurologic: CNII-XII intact. Normal strength, sensation.            Assessment & Plan   Healthy female exam.  Diagnoses and all orders for this visit:    1. Need for influenza vaccination (Primary)  -     Tdap Vaccine Greater Than or Equal To 8yo IM    2. Routine adult health maintenance    3. Abnormal urinalysis  -     Urinalysis With Microscopic If Indicated (No Culture) - Urine, Clean Catch; Future    4. Anxiety  -     citalopram (CeleXA) 20 MG tablet; Take 1 tablet by mouth Daily. Indications: Generalized Anxiety Disorder  Dispense: 90 tablet; Refill: 4    5. Moderate mixed hyperlipidemia not requiring statin therapy  -     Lipid Panel; Future      1.  Labs ordered this week  Asthma is stable  Mammogram Pap smear normal  Is a cervical HPV carrier  2. Patient Counseling:  --Nutrition: Stressed importance of moderation in: sodium, caffeine, , saturated fat and cholesterol.  Discussed: caloric balance, sufficient intake of fresh fruits, vegetables, fiber, calcium, iron.  --Exercise: Stressed the importance of regular exercise.   --Substance Abuse: Discussed cessation and or reduction of tobacco, alcohol, or other drug use; driving or other dangerous activities under the influence.    --Dental health: Discussed importance of regular tooth brushing, flossing, and dental visits.  --Suggested having eyes and vision checked if needed or past due.  --Immunizations reviewed  and given if needed.  --Discussed benefits of screening colonoscopy and or ColoGuard.  3. Discussed the patient's BMI with her.  The BMI is in the acceptable range  4. Follow up in one year    There are no Patient Instructions on file for this visit.    Medications Discontinued During This Encounter   Medication Reason    cetirizine (zyrTEC) 10 MG tablet *Therapy completed    citalopram (CeleXA) 20 MG tablet Reorder        Dr. Aaron Ro MD  [place], [place]  [facility]

## 2025-02-12 ENCOUNTER — APPOINTMENT (OUTPATIENT)
Dept: PHYSICAL THERAPY | Facility: HOSPITAL | Age: 55
End: 2025-02-12
Payer: COMMERCIAL

## 2025-02-14 ENCOUNTER — APPOINTMENT (OUTPATIENT)
Dept: PHYSICAL THERAPY | Facility: HOSPITAL | Age: 55
End: 2025-02-14
Payer: OTHER MISCELLANEOUS

## 2025-02-19 ENCOUNTER — HOSPITAL ENCOUNTER (OUTPATIENT)
Dept: PHYSICAL THERAPY | Facility: HOSPITAL | Age: 55
Setting detail: THERAPIES SERIES
Discharge: HOME | End: 2025-02-19
Payer: OTHER MISCELLANEOUS

## 2025-02-19 DIAGNOSIS — M54.2 NECK PAIN: Primary | ICD-10-CM

## 2025-02-19 PROCEDURE — 97012 MECHANICAL TRACTION THERAPY: CPT

## 2025-02-19 PROCEDURE — G0283 ELEC STIM OTHER THAN WOUND: HCPCS

## 2025-02-19 NOTE — THERAPY TREATMENT NOTE
Outpatient Physical Therapy Ortho Treatment Note  ALEXANDER Haile     Patient Name: Jocelyn Mckeon  : 1970  MRN: [id number]      Today's Date: 2025      Visit Date: 2025    Visit Dx:    ICD-10-CM ICD-9-CM   1. Neck pain  M54.2 723.1       Patient Active Problem List   Diagnosis    Anxiety    Moderate persistent asthma without complication    Environmental allergies    Routine adult health maintenance    Complex regional pain syndrome i of left lower limb    Carpal tunnel syndrome on both sides    Status post arthroscopic surgery of left knee, DOS 10/21/2019    Intractable neuropathic pain of left knee    Chronic cervical pain    Chondromalacia of knee, left    Screening mammogram for breast cancer    Moderate mixed hyperlipidemia not requiring statin therapy    HPV in female        Past Medical History:   Diagnosis Date    Acute sinus infection     on poab    Anxiety     Arthritis     Asthma     STARTED @ 41 YRS OLD, DR. BUENO, & [facility] ALLERGY DR. LOPEZ    COVID-19     2021    CRPS (complex regional pain syndrome) type I of lower limb     Elevated cholesterol     Frequent UTI     GERD (gastroesophageal reflux disease)     History of  novel coronavirus disease (COVID-19)     Iliotibial band syndrome affecting left lower leg     Itching     BLE has some scratches to notify MD if doesn't clear up prior to surgery    Kidney stones     Lateral collateral ligament deficiency of left knee 2016    Lateral meniscal tear     Lumbar paraspinal muscle spasm 2017    Meniscal cyst, left 2019    Migraines     Patellofemoral pain syndrome of left knee 2019    PONV (postoperative nausea and vomiting)     Seasonal allergies     Subchondral insufficiency fracture of femoral condyle, left, initial encounter 01/10/2020    Tear of lateral meniscus of left knee, current 2019        Past Surgical History:   Procedure Laterality Date     SECTION      x2     "DIAGNOSTIC LAPAROSCOPY      FOOT SURGERY Right     HERNIA REPAIR Bilateral     inguinal hernia age 6    INTRAUTERINE DEVICE INSERTION      KNEE ARTHROSCOPY Left 10/21/2016    Procedure: KNEE ARTHROSCOPY debridement of lateral meniscal tear;  Surgeon: Son Mojica MD;  Location:  [facility];  Service:     KNEE ARTHROSCOPY Left 7/27/2018    Procedure: KNEE ARTHROSCOPY;  Surgeon: Son Mojica MD;  Location:  [facility];  Service: Orthopedics    KNEE ARTHROSCOPY Left 12/2/2021    Procedure: LEFT KNEE DIAGNOSTIC  ARTHROSCOPY AND PARTLATERAL MENISECTOMY AND REMOVAL OF MULTIPLE FIXATION DEVICES;  Surgeon: Rodríguez Hamlin MD;  Location:  [facility];  Service: Orthopedics;  Laterality: Left;    KNEE MENISCAL REPAIR Left 10/21/2019    Procedure: knee arthroscopy, debridement of meniscal cyst, and lateral meniscal repair;  Surgeon: Bridger Figueroa MD;  Location:  [facility];  Service: Orthopedics    NERVE BLOCK Left 5/25/2021    Procedure: KNEE GENICULAR NERVE BLOCK UNDER FLUORO- x2, 3-4 wks apart;  Surgeon: James Spivey MD;  Location: [facility] OR;  Service: Pain Management;  Laterality: Left;        PT Ortho       Row Name 02/19/25 1500       Subjective    Subjective Comments Pt reports her neck has been okay. \"The right side is much better than then left side.\"  -LN       Precautions and Contraindications    Precautions/Limitations no known precautions/limitations  -LN       Subjective Pain    Able to rate subjective pain? yes  -LN    Pre-Treatment Pain Level 4  4-5 with movement; \"pinch\"  -LN    Subjective Pain Comment L>R side of neck  -LN              User Key  (r) = Recorded By, (t) = Taken By, (c) = Cosigned By      Initials Name Provider Type    LN Bren Shepherd, PT Physical Therapist                                 PT Assessment/Plan       Row Name 02/19/25 1600          PT Assessment    Assessment Comments Pt with overall decreased neck pain but mild pain still persists L>R side of neck; she " "continues to report benefit from PT lefty cervical traction. She is doing well with HEP.  -LN        PT Plan    PT Frequency 1x/week;2x/week  -LN     PT Plan Comments Continue per POC; cervical traction; IFC/MH PRN. Progress exercises as tolerated.  -LN               User Key  (r) = Recorded By, (t) = Taken By, (c) = Cosigned By      Initials Name Provider Type    LN Bren Shepherd, PT Physical Therapist                     Modalities       Row Name 02/19/25 1500             Moist Heat    MH Applied Yes  -LN      Location Cervical spine & back with IFC with pt supine in hooklying.  -LN      PT Moist Heat Minutes --  15  -LN      MH Prior to Rx Yes  prior to ICTX  -LN         ELECTRICAL STIMULATION    Attended/Unattended Unattended  -LN      Stimulation Type IFC  -LN      Location/Electrode Placement/Other B UT and levator scapulae with MH.  -LN      PT E-Stim Unattended Minutes 15  -LN         Traction 46863    Traction Type Cervical  with MH across shoulders  -LN      PT Traction Rx Minutes 15  -LN      Duration Intermittent  -LN      Position Hook-lying  -LN      Weight 17  -LN      Hold 60  -LN      Relax 20  -LN                User Key  (r) = Recorded By, (t) = Taken By, (c) = Cosigned By      Initials Name Provider Type    LN Bren Shepherd, PT Physical Therapist                   OP Exercises       Row Name 02/19/25 1500             Precautions    Existing Precautions/Restrictions no known precautions/restrictions  -LN         Subjective    Subjective Comments Pt reports her neck has been okay. \"The right side is much better than then left side.\"  -LN         Subjective Pain    Able to rate subjective pain? yes  -LN      Pre-Treatment Pain Level 4  4-5 with movement; \"pinch\"  -LN      Subjective Pain Comment L>R side of neck  -LN         Exercise 1    Exercise Name 1 verbally reviewed prior HEP/neck stretches (cervical SB/UT stretch; scapular retraction; corner stretches; levator scapula " stretch). Pt to continue with HEP daily as tolerated.  -LN         Exercise 2    Exercise Name 2 supine chin tucks  -LN      Cueing 2 Verbal;Tactile;Demo  -LN      Reps 2 5  -LN      Time 2 5 sec  -LN                User Key  (r) = Recorded By, (t) = Taken By, (c) = Cosigned By      Initials Name Provider Type    Bren Rodriguez, PT Physical Therapist                                     Therapy Education  Education Details: Pt to add supine chin tucks to HEP as tolerated. Pt to use MH/CP/Home TENS PRN.  Given: HEP, Symptoms/condition management, Posture/body mechanics  Program: Reinforced, New (added supine chin tucks)  How Provided: Verbal, Demonstration, Written  Provided to: Patient  Level of Understanding: Teach back education performed, Verbalized, Demonstrated              Time Calculation:   Start Time: 1558  Stop Time: 1658  Time Calculation (min): 60 min  Untimed Charges  PT Traction Rx Minutes: 15  PT E-Stim Unattended Minutes: 15  PT Moist Heat Minutes:  (15)  Total Minutes  Untimed Charges Total Minutes: 30   Total Minutes: 30  Therapy Charges for Today       Code Description Service Date Service Provider Modifiers Qty    30037470701 HC PT ELECTRICAL STIM UNATTENDED 2/19/2025 Bren Shepherd, PT  1    40472176906 HC PT TRACTION CERVICAL 2/19/2025 Bren Shepherd, PT GP 1                      Bren Shepherd, PT  2/19/2025

## 2025-02-26 ENCOUNTER — HOSPITAL ENCOUNTER (OUTPATIENT)
Dept: PHYSICAL THERAPY | Facility: HOSPITAL | Age: 55
Setting detail: THERAPIES SERIES
Discharge: HOME | End: 2025-02-26
Payer: COMMERCIAL

## 2025-02-26 DIAGNOSIS — M54.2 NECK PAIN: Primary | ICD-10-CM

## 2025-02-26 PROCEDURE — 97012 MECHANICAL TRACTION THERAPY: CPT

## 2025-02-26 PROCEDURE — G0283 ELEC STIM OTHER THAN WOUND: HCPCS

## 2025-02-26 NOTE — THERAPY TREATMENT NOTE
Outpatient Physical Therapy Ortho Treatment Note  ALEXANDER Haile     Patient Name: Jocelyn Mckeon  : 1970  MRN: [id number]  Today's Date: 2025        Visit Date: 2025    Visit Dx:    ICD-10-CM ICD-9-CM   1. Neck pain  M54.2 723.1       Patient Active Problem List   Diagnosis    Anxiety    Moderate persistent asthma without complication    Environmental allergies    Routine adult health maintenance    Complex regional pain syndrome i of left lower limb    Carpal tunnel syndrome on both sides    Status post arthroscopic surgery of left knee, DOS 10/21/2019    Intractable neuropathic pain of left knee    Chronic cervical pain    Chondromalacia of knee, left    Screening mammogram for breast cancer    Moderate mixed hyperlipidemia not requiring statin therapy    HPV in female        Past Medical History:   Diagnosis Date    Acute sinus infection     on poab    Anxiety     Arthritis     Asthma     STARTED @ 41 YRS OLD, DR. BUENO, & [facility] ALLERGY DR. LOPEZ    COVID-19     2021    CRPS (complex regional pain syndrome) type I of lower limb     Elevated cholesterol     Frequent UTI     GERD (gastroesophageal reflux disease)     History of 2019 novel coronavirus disease (COVID-19)     Iliotibial band syndrome affecting left lower leg     Itching     BLE has some scratches to notify MD if doesn't clear up prior to surgery    Kidney stones     Lateral collateral ligament deficiency of left knee 2016    Lateral meniscal tear     Lumbar paraspinal muscle spasm 2017    Meniscal cyst, left 2019    Migraines     Patellofemoral pain syndrome of left knee 2019    PONV (postoperative nausea and vomiting)     Seasonal allergies     Subchondral insufficiency fracture of femoral condyle, left, initial encounter 01/10/2020    Tear of lateral meniscus of left knee, current 2019        Past Surgical History:   Procedure Laterality Date     SECTION      x2    DIAGNOSTIC  "LAPAROSCOPY      FOOT SURGERY Right     HERNIA REPAIR Bilateral     inguinal hernia age 6    INTRAUTERINE DEVICE INSERTION      KNEE ARTHROSCOPY Left 10/21/2016    Procedure: KNEE ARTHROSCOPY debridement of lateral meniscal tear;  Surgeon: Son Mojica MD;  Location:  [facility];  Service:     KNEE ARTHROSCOPY Left 7/27/2018    Procedure: KNEE ARTHROSCOPY;  Surgeon: Son Mojica MD;  Location:  [facility];  Service: Orthopedics    KNEE ARTHROSCOPY Left 12/2/2021    Procedure: LEFT KNEE DIAGNOSTIC  ARTHROSCOPY AND PARTLATERAL MENISECTOMY AND REMOVAL OF MULTIPLE FIXATION DEVICES;  Surgeon: Rodríguez Hamlin MD;  Location:  [facility];  Service: Orthopedics;  Laterality: Left;    KNEE MENISCAL REPAIR Left 10/21/2019    Procedure: knee arthroscopy, debridement of meniscal cyst, and lateral meniscal repair;  Surgeon: Bridger Figueroa MD;  Location:  [facility];  Service: Orthopedics    NERVE BLOCK Left 5/25/2021    Procedure: KNEE GENICULAR NERVE BLOCK UNDER FLUORO- x2, 3-4 wks apart;  Surgeon: James Spivey MD;  [facility];  Service: Pain Management;  Laterality: Left;        PT Ortho       Row Name 02/26/25 1600       Subjective    Subjective Comments Pt reports her \"neck is getting better I think.\" \"In general I can move my neck better.\" \"I still get an occasional pinch.\" Pain is still worse on L than R per pt. \"If I have a busy day or am more stressed; I do feel it more.\"  -LN       Precautions and Contraindications    Precautions/Limitations no known precautions/limitations  -LN       Subjective Pain    Able to rate subjective pain? yes  -LN    Pre-Treatment Pain Level 4  with movement  -LN    Subjective Pain Comment at rest= 3/10  -LN              User Key  (r) = Recorded By, (t) = Taken By, (c) = Cosigned By      Initials Name Provider Type    LN Bren Shepherd, PT Physical Therapist                                 PT Assessment/Plan       Row Name 02/26/25 1600          PT Assessment    " Assessment Comments Pt with overall decreased neck pain but mild pain still persists L>R side of neck that is worse with movement and worse if she is stressed or had a really busy day.  She continues to report benefit from PT lefty cervical traction. She continues to do well with HEP.  -LN        PT Plan    PT Frequency 1x/week  -LN     Predicted Duration of Therapy Intervention (PT) She has 2 more visits approved by w/c  -LN     PT Plan Comments Continue per POC; cervical traction; IFC/MH PRN. Progress exercises as tolerated.  Increase traction time to 20 minutes next visit as tolerated. -LN               User Key  (r) = Recorded By, (t) = Taken By, (c) = Cosigned By      Initials Name Provider Type    Bren Rodriguez, PT Physical Therapist                     Modalities       Row Name 02/26/25 1600             Precautions    Existing Precautions/Restrictions no known precautions/restrictions  -LN         Moist Heat    MH Applied Yes  -LN      Location Cervical spine & back with IFC with pt supine in hooklying.  -LN      PT Moist Heat Minutes --  15  -LN      MH Prior to Rx Yes  prior to ICTX  -LN         ELECTRICAL STIMULATION    Attended/Unattended Unattended  -LN      Stimulation Type IFC  -LN      Location/Electrode Placement/Other B UT and levator scapulae with MH.  -LN      PT E-Stim Unattended Minutes 15  -LN         Traction 51151    Traction Type Cervical  with MH across shoulders  -LN      PT Traction Rx Minutes 18  -LN      Duration Intermittent  -LN      Position Hook-lying  -LN      Weight 17  -LN      Hold 60  -LN      Relax 20  -LN                User Key  (r) = Recorded By, (t) = Taken By, (c) = Cosigned By      Initials Name Provider Type    Bren Rodriguez, PT Physical Therapist                   OP Exercises       Row Name 02/26/25 1600             Precautions    Existing Precautions/Restrictions no known precautions/restrictions  -LN         Subjective    Subjective Comments  "Pt reports her \"neck is getting better I think.\" \"In general I can move my neck better.\" \"I still get an occasional pinch.\" Pain is still worse on L than R per pt. \"If I have a busy day or am more stressed; I do feel it more.\"  -LN         Subjective Pain    Able to rate subjective pain? yes  -LN      Pre-Treatment Pain Level 4  with movement  -LN      Subjective Pain Comment at rest= 3/10  -LN         Exercise 1    Exercise Name 1 verbally reviewed prior HEP/neck stretches (cervical SB/UT stretch; scapular retraction; corner stretches; levator scapula stretch, & supine chin tucks). Pt to continue with HEP daily as tolerated.  -LN                User Key  (r) = Recorded By, (t) = Taken By, (c) = Cosigned By      Initials Name Provider Type    Bren Rodriguez, PT Physical Therapist                                     Therapy Education  Education Details: Pt to continue with HEP and use MH/CP/Home TENS unit as needed.  Given: HEP, Symptoms/condition management, Posture/body mechanics  Program: Reinforced  How Provided: Verbal  Provided to: Patient  Level of Understanding: Verbalized              Time Calculation:   Start Time: 1603  Stop Time: 1700  Time Calculation (min): 57 min  Untimed Charges  PT Traction Rx Minutes: 18  PT E-Stim Unattended Minutes: 15  PT Moist Heat Minutes:  (15)  Total Minutes  Untimed Charges Total Minutes: 33   Total Minutes: 33  Therapy Charges for Today       Code Description Service Date Service Provider Modifiers Qty    17545340155 HC PT ELECTRICAL STIM UNATTENDED 2/26/2025 Bren Shepherd, PT  1    19190833344 HC PT TRACTION CERVICAL 2/26/2025 Bren Shepherd, PT GP 1                      Bren Shepherd, PT  2/26/2025     "

## 2025-03-05 ENCOUNTER — HOSPITAL ENCOUNTER (OUTPATIENT)
Dept: PHYSICAL THERAPY | Facility: HOSPITAL | Age: 55
Setting detail: THERAPIES SERIES
Discharge: HOME | End: 2025-03-05
Payer: OTHER MISCELLANEOUS

## 2025-03-05 DIAGNOSIS — M54.2 NECK PAIN: Primary | ICD-10-CM

## 2025-03-05 PROCEDURE — G0283 ELEC STIM OTHER THAN WOUND: HCPCS

## 2025-03-05 PROCEDURE — 97012 MECHANICAL TRACTION THERAPY: CPT

## 2025-03-05 NOTE — THERAPY TREATMENT NOTE
Outpatient Physical Therapy Ortho Treatment Note  ALEXANDER Haile     Patient Name: Jocelyn Mckeon  : 1970  MRN: [id number]  Today's Date: 3/5/2025        Visit Date: 2025    Visit Dx:    ICD-10-CM ICD-9-CM   1. Neck pain  M54.2 723.1       Patient Active Problem List   Diagnosis    Anxiety    Moderate persistent asthma without complication    Environmental allergies    Routine adult health maintenance    Complex regional pain syndrome i of left lower limb    Carpal tunnel syndrome on both sides    Status post arthroscopic surgery of left knee, DOS 10/21/2019    Intractable neuropathic pain of left knee    Chronic cervical pain    Chondromalacia of knee, left    Screening mammogram for breast cancer    Moderate mixed hyperlipidemia not requiring statin therapy    HPV in female        Past Medical History:   Diagnosis Date    Acute sinus infection     on poab    Anxiety     Arthritis     Asthma     STARTED @ 41 YRS OLD, DR. BUENO, & [facility] ALLERGY DR. LOPEZ    COVID-19     2021    CRPS (complex regional pain syndrome) type I of lower limb     Elevated cholesterol     Frequent UTI     GERD (gastroesophageal reflux disease)     History of 2019 novel coronavirus disease (COVID-19)     Iliotibial band syndrome affecting left lower leg     Itching     BLE has some scratches to notify MD if doesn't clear up prior to surgery    Kidney stones     Lateral collateral ligament deficiency of left knee 2016    Lateral meniscal tear     Lumbar paraspinal muscle spasm 2017    Meniscal cyst, left 2019    Migraines     Patellofemoral pain syndrome of left knee 2019    PONV (postoperative nausea and vomiting)     Seasonal allergies     Subchondral insufficiency fracture of femoral condyle, left, initial encounter 01/10/2020    Tear of lateral meniscus of left knee, current 2019        Past Surgical History:   Procedure Laterality Date     SECTION      x2    DIAGNOSTIC  "LAPAROSCOPY      FOOT SURGERY Right     HERNIA REPAIR Bilateral     inguinal hernia age 6    INTRAUTERINE DEVICE INSERTION      KNEE ARTHROSCOPY Left 10/21/2016    Procedure: KNEE ARTHROSCOPY debridement of lateral meniscal tear;  Surgeon: Son Mojica MD;  Location:  [facility];  Service:     KNEE ARTHROSCOPY Left 7/27/2018    Procedure: KNEE ARTHROSCOPY;  Surgeon: Son Mojica MD;  Location:  [facility];  Service: Orthopedics    KNEE ARTHROSCOPY Left 12/2/2021    Procedure: LEFT KNEE DIAGNOSTIC  ARTHROSCOPY AND PARTLATERAL MENISECTOMY AND REMOVAL OF MULTIPLE FIXATION DEVICES;  Surgeon: Rodríguez Hamlin MD;  Location:  [facility];  Service: Orthopedics;  Laterality: Left;    KNEE MENISCAL REPAIR Left 10/21/2019    Procedure: knee arthroscopy, debridement of meniscal cyst, and lateral meniscal repair;  Surgeon: Bridger Figueroa MD;  Location:  [facility];  Service: Orthopedics    NERVE BLOCK Left 5/25/2021    Procedure: KNEE GENICULAR NERVE BLOCK UNDER FLUORO- x2, 3-4 wks apart;  Surgeon: James Spivey MD;  Location: [facility] OR;  Service: Pain Management;  Laterality: Left;        PT Ortho       Row Name 03/05/25 1600       Subjective    Subjective Comments \"I still feel like I can move my neck better, but it still pinches.\" Pain is still worse on L than R side of neck per pt. \"I did get a little sore after the last traction but I still feel like it helps a lot.\"  -LN       Precautions and Contraindications    Precautions/Limitations no known precautions/limitations  -LN       Subjective Pain    Able to rate subjective pain? yes  -LN    Pre-Treatment Pain Level 4  with movement  -LN    Subjective Pain Comment at rest= 3/10  -LN              User Key  (r) = Recorded By, (t) = Taken By, (c) = Cosigned By      Initials Name Provider Type    LN Bren Shepherd, PT Physical Therapist                                 PT Assessment/Plan       Row Name 03/05/25 1600          PT Assessment    Assessment " "Comments Pt with overall decreased neck pain but mild pain still persists L>R side of neck that is worse with movement; still reports a \"pinch\" in neck with movement.  She continues to report benefit from PT, lefty cervical traction. She continues to do well with HEP.  -LN        PT Plan    PT Frequency 1x/week  -LN     Predicted Duration of Therapy Intervention (PT) She has 1 more visit approved by w/c  -LN     PT Plan Comments Continue per POC; cervical traction; IFC/MH PRN. Progress exercises as tolerated. Increase traction time to 20 minutes next visit as tolerated. Pt returns to MD on 3/24/25.  -LN               User Key  (r) = Recorded By, (t) = Taken By, (c) = Cosigned By      Initials Name Provider Type    LN Bren Shepherd, PT Physical Therapist                     Modalities       Row Name 03/05/25 1600             Moist Heat    MH Applied Yes  -LN      Location Cervical spine & back with IFC with pt supine in hooklying.  -LN      PT Moist Heat Minutes --  15  -LN      MH Prior to Rx Yes  prior to ICTX  -LN         ELECTRICAL STIMULATION    Attended/Unattended Unattended  -LN      Stimulation Type IFC  -LN      Location/Electrode Placement/Other B UT and midscapula areas with MH.  -LN      PT E-Stim Unattended Minutes 15  -LN         Traction 39914    Traction Type Cervical  with MH across shoulders  -LN      PT Traction Rx Minutes 18  -LN      Duration Intermittent  -LN      Position Hook-lying  -LN      Weight 17  -LN      Hold 60  -LN      Relax 20  -LN                User Key  (r) = Recorded By, (t) = Taken By, (c) = Cosigned By      Initials Name Provider Type    LN Bren Shepherd, PT Physical Therapist                   OP Exercises       Row Name 03/05/25 1600             Precautions    Existing Precautions/Restrictions no known precautions/restrictions  -LN         Subjective    Subjective Comments \"I still feel like I can move my neck better, but it still pinches.\" Pain is still " "worse on L than R side of neck per pt. \"I did get a little sore after the last traction but I still feel like it helps a lot.\"  -LN         Subjective Pain    Able to rate subjective pain? yes  -LN      Pre-Treatment Pain Level 4  with movement  -LN      Subjective Pain Comment at rest= 3/10  -LN         Exercise 1    Exercise Name 1 verbally reviewed prior HEP/neck stretches (cervical SB/UT stretch; scapular retraction; corner stretches; levator scapula stretch, & supine chin tucks). Pt to continue with HEP daily as tolerated.  -LN                User Key  (r) = Recorded By, (t) = Taken By, (c) = Cosigned By      Initials Name Provider Type    LN Bren Shepherd, PT Physical Therapist                                     Therapy Education  Education Details: Pt to continue with HEP and use MH/CP/Home TENS unit as needed.  Given: HEP, Symptoms/condition management, Posture/body mechanics  Program: Reinforced  How Provided: Verbal  Provided to: Patient  Level of Understanding: Verbalized              Time Calculation:   Start Time: 1617  Stop Time: 1715  Time Calculation (min): 58 min  Untimed Charges  PT Traction Rx Minutes: 18  PT E-Stim Unattended Minutes: 15  PT Moist Heat Minutes:  (15)  Total Minutes  Untimed Charges Total Minutes: 33   Total Minutes: 33  Therapy Charges for Today       Code Description Service Date Service Provider Modifiers Qty    71816389263 HC PT ELECTRICAL STIM UNATTENDED 3/5/2025 Bren Shepherd, PT  1    05071469397 HC PT TRACTION CERVICAL 3/5/2025 rBen Shepherd, PT GP 1                      Bren Shepherd, PT  3/5/2025     "

## 2025-03-17 ENCOUNTER — APPOINTMENT (OUTPATIENT)
Dept: PHYSICAL THERAPY | Facility: HOSPITAL | Age: 55
End: 2025-03-17
Payer: OTHER MISCELLANEOUS

## 2025-03-17 ENCOUNTER — DOCUMENTATION (OUTPATIENT)
Dept: PHYSICAL THERAPY | Facility: HOSPITAL | Age: 55
End: 2025-03-17
Payer: COMMERCIAL

## 2025-03-28 ENCOUNTER — APPOINTMENT (OUTPATIENT)
Dept: PHYSICAL THERAPY | Facility: HOSPITAL | Age: 55
End: 2025-03-28
Payer: OTHER MISCELLANEOUS

## 2025-04-02 ENCOUNTER — HOSPITAL ENCOUNTER (OUTPATIENT)
Dept: PHYSICAL THERAPY | Facility: HOSPITAL | Age: 55
Setting detail: THERAPIES SERIES
Discharge: HOME | End: 2025-04-02
Payer: OTHER MISCELLANEOUS

## 2025-04-02 DIAGNOSIS — M54.2 NECK PAIN: Primary | ICD-10-CM

## 2025-04-02 PROCEDURE — G0283 ELEC STIM OTHER THAN WOUND: HCPCS

## 2025-04-02 PROCEDURE — 97012 MECHANICAL TRACTION THERAPY: CPT

## 2025-04-02 NOTE — THERAPY TREATMENT NOTE
Outpatient Physical Therapy Ortho Treatment Note & Re-evaluation  ALEXANDER Haile     Patient Name: Jocelyn Mckeon  : 1970  MRN: [id number]  Today's Date: 2025        Visit Date: 2025    Visit Dx:    ICD-10-CM ICD-9-CM   1. Neck pain  M54.2 723.1       Patient Active Problem List   Diagnosis    Anxiety    Moderate persistent asthma without complication    Environmental allergies    Routine adult health maintenance    Complex regional pain syndrome i of left lower limb    Carpal tunnel syndrome on both sides    Status post arthroscopic surgery of left knee, DOS 10/21/2019    Intractable neuropathic pain of left knee    Chronic cervical pain    Chondromalacia of knee, left    Screening mammogram for breast cancer    Moderate mixed hyperlipidemia not requiring statin therapy    HPV in female        Past Medical History:   Diagnosis Date    Acute sinus infection     on poab    Anxiety     Arthritis     Asthma     STARTED @ 41 YRS OLD, DR. BUENO, & [facility] ALLERGY DR. LOPEZ    COVID-19     2021    CRPS (complex regional pain syndrome) type I of lower limb     Elevated cholesterol     Frequent UTI     GERD (gastroesophageal reflux disease)     History of  novel coronavirus disease (COVID-19) 2/15/2021    Iliotibial band syndrome affecting left lower leg     Itching     BLE has some scratches to notify MD if doesn't clear up prior to surgery    Kidney stones     Lateral collateral ligament deficiency of left knee 2016    Lateral meniscal tear     Lumbar paraspinal muscle spasm 2017    Meniscal cyst, left 2019    Migraines     Patellofemoral pain syndrome of left knee 2019    PONV (postoperative nausea and vomiting)     Seasonal allergies     Subchondral insufficiency fracture of femoral condyle, left, initial encounter 01/10/2020    Tear of lateral meniscus of left knee, current 2019        Past Surgical History:   Procedure Laterality Date      "SECTION      x2    DIAGNOSTIC LAPAROSCOPY      FOOT SURGERY Right     HERNIA REPAIR Bilateral     inguinal hernia age 6    INTRAUTERINE DEVICE INSERTION      KNEE ARTHROSCOPY Left 10/21/2016    Procedure: KNEE ARTHROSCOPY debridement of lateral meniscal tear;  Surgeon: Son Mojica MD;  Location:  [facility];  Service:     KNEE ARTHROSCOPY Left 7/27/2018    Procedure: KNEE ARTHROSCOPY;  Surgeon: Son Mojica MD;  Location:  [facility];  Service: Orthopedics    KNEE ARTHROSCOPY Left 12/2/2021    Procedure: LEFT KNEE DIAGNOSTIC  ARTHROSCOPY AND PARTLATERAL MENISECTOMY AND REMOVAL OF MULTIPLE FIXATION DEVICES;  Surgeon: Rodríguez Hamlin MD;  Location:  [facility];  Service: Orthopedics;  Laterality: Left;    KNEE MENISCAL REPAIR Left 10/21/2019    Procedure: knee arthroscopy, debridement of meniscal cyst, and lateral meniscal repair;  Surgeon: Bridger Figueroa MD;  Location:  [facility];  Service: Orthopedics    NERVE BLOCK Left 5/25/2021    Procedure: KNEE GENICULAR NERVE BLOCK UNDER FLUORO- x2, 3-4 wks apart;  Surgeon: James Spivey MD;  Location: [facility] OR;  Service: Pain Management;  Laterality: Left;        PT Ortho       Row Name 04/02/25 1400       Subjective    Subjective Comments \"My neck has been very good.\" \"It is sore but I have more mobility.\" \"I can tell a difference when I come here and I get the traction.\" Pt reports she hasn't had any N/T in her hands for awhile. \"I see Dr. Rodriguez on Monday.\"  Pt reporting her left knee has really been bother her lately and causing disturbed sleep. She recently got an injection in L hand.  -LN       Precautions and Contraindications    Precautions/Limitations no known precautions/limitations  -LN       Subjective Pain    Able to rate subjective pain? yes  -LN    Pre-Treatment Pain Level 3  primarily with movement  -LN    Subjective Pain Comment at rest= 2/10  -LN       Cervical Palpation    Levator Scapula Bilateral:;Tender;Guarded/taut  minimal  -LN    Upper " "Traps Bilateral:;Tender;Guarded/taut  minimal  -LN    Middle Traps Bilateral:;Tender;Guarded/taut  minimal  -LN    Rhomboids Bilateral:;Tender;Guarded/taut  minimal  -LN       Cervical/Thoracic Special Tests    Bakody/Shoulder Abduction Sign (Cervical Radiculopathy) Bilateral:;Positive  some relief of neck pain  -LN       General ROM    GENERAL ROM COMMENTS B shoulder AROM WFL-WNL all planes; she still reports some relief of neck pain if she has her arms supported up.  -LN       Head/Neck/Trunk    Neck Extension AROM WFL- \"pinches\"  -LN    Neck Flexion AROM WFL- feels bruised  -LN    Neck Lt Lateral Flexion AROM 50%-pain & tightness  -LN    Neck Rt Lateral Flexion AROM 75%- pain & tightness  -LN    Neck Lt Rotation AROM 75%-pain and tightness  -LN    Neck Rt Rotation AROM WFL  -LN       MMT (Manual Muscle Testing)    General MMT Comments B shoulder strength 5/5.  -LN       Sensation    Sensation WNL? WNL  -LN    Light Touch No apparent deficits  -LN    Additional Comments no N/T in hands lately  still has to elevate her arms with driving due to neck pressure  -LN       Upper Extremity Flexibility    Upper Trapezius Bilateral:;Mildly limited  -LN    Levator Scapula Bilateral:;Mildly limited  -LN    Pect Minor Bilateral:;WNL  -LN       Gait/Stairs (Locomotion)    Comment, (Gait/Stairs) Pt is independent with all functional mobility and gait; no AD used. Pt with occasional limp on L due to knee pain.  -LN              User Key  (r) = Recorded By, (t) = Taken By, (c) = Cosigned By      Initials Name Provider Type    Bren Rodriguez, PT Physical Therapist                                 PT Assessment/Plan       Row Name 04/02/25 1400          PT Assessment    Assessment Comments Pt has progressed fairly well with PT with overall decreased neck reported but does still report having a feeling of pressure down onto neck and continues to report benefit from ICTX. Pt with improved CROM but still limited lefty in L " "lateral flexion (now WFL in flexion,extension, and R rotation). Pt reports she hasn't felt any N/T in hands \"for awhile\". Pt reports improved tolerance to sitting at work and working on computer. Overall  decreased mm guarding noted, but minimal persists lefty in B UT/levator/MT. She still has to prop her arms up occasionally and with driving for relief of neck sx., which can be a sign of a herniated disc. She is independent with HEP. She has met 5 out fo 6 STG and partially met 1 STG and she has met 2 out of 5 LTG & has partially met 2 LTG. Feel pt is ready for discharge with HEP at this time but if sx worsen; feel she would benefit from continued PT, including cervical traction.  Prior MRI in 2023 showed disc dessication and mild loss of disc height at C4, C5; C5, C6; and C6, C7 levels. Feel she may benefit from another MRI if her sx persist or worsen to check for further disc damage since fall last year.  -LN        PT Plan    Predicted Duration of Therapy Intervention (PT) See below  -LN     PT Plan Comments Pt has received all PT visits approved by W/C. Pt sees MD on Monday,4/7/25. Will plan on discharge with HEP at this time, but will continue as MD orders and as W/C approves. Pt to continue with HEP as tolerated and use CP/HP PRN.  -LN               User Key  (r) = Recorded By, (t) = Taken By, (c) = Cosigned By      Initials Name Provider Type    LN Bren Shepherd, PT Physical Therapist                     Modalities       Row Name 04/02/25 1400             Moist Heat    MH Applied Yes  -LN      Location Cervical spine & back with IFC with pt supine in hooklying.  -LN      PT Moist Heat Minutes --  15  -LN      MH Prior to Rx Yes  prior to ICTX  -LN         ELECTRICAL STIMULATION    Attended/Unattended Unattended  -LN      Stimulation Type IFC  -LN      Location/Electrode Placement/Other B UT and midscapula areas with MH.  -LN      PT E-Stim Unattended Minutes 15  -LN         Traction 64903    Traction " "Type Cervical  with MH across shoulders  -LN      PT Traction Rx Minutes 20  -LN      Duration Intermittent  -LN      Position Hook-lying  -LN      Weight 17  -LN      Hold 60  -LN      Relax 20  -LN                User Key  (r) = Recorded By, (t) = Taken By, (c) = Cosigned By      Initials Name Provider Type    Bren Rodriguez, PT Physical Therapist                   OP Exercises       Row Name 04/02/25 1400             Precautions    Existing Precautions/Restrictions no known precautions/restrictions  -LN         Subjective    Subjective Comments \"My neck has been very good.\" \"It is sore but I have more mobility.\" \"I can tell a difference when I come here and I get the traction.\" \"I see Dr. Rodriguez on Monday.\"  Pt reporting her left knee has really been bother her lately and causing disturbed sleep. She recently got an injection in L hand.  -LN         Subjective Pain    Able to rate subjective pain? yes  -LN      Pre-Treatment Pain Level 3  primarily with movement  -LN      Subjective Pain Comment at rest= 2/10  -LN         Exercise 1    Exercise Name 1 verbally reviewed prior HEP/neck stretches (cervical SB/UT stretch; scapular retraction; corner stretches; levator scapula stretch, & supine chin tucks). Pt to continue with HEP daily as tolerated.  -LN                User Key  (r) = Recorded By, (t) = Taken By, (c) = Cosigned By      Initials Name Provider Type    Bren Rodriguez, PT Physical Therapist                                  PT OP Goals       Row Name 04/02/25 1400          PT Short Term Goals    STG Date to Achieve --  All STG met except STG #3 partially met  -LN     STG 1 Pt to verbally report decreased neck pain to <5/10 with ADLs and everyday activities.  -LN     STG 1 Progress Met  -LN     STG 2 Pt independent with initial HEP issued by therapist.  -LN     STG 2 Progress Met  -LN     STG 3 CROM improved by 25% to allow her to turn her neck better with driving.  -LN     STG 3 " "Progress Partially Met  -LN     STG 3 Progress Comments met for all planes except L lateral flexion still 50%.  -LN     STG 4 Muscle guarding decreased to minimal B UT/levator/MT/rhomboids/scalenes.  -LN     STG 4 Progress Met  -LN     STG 5 Pt to report decreased N/T in B hands by 50%.  -LN     STG 5 Progress Met  -LN     STG 5 Progress Comments She reports that she hasn't felt any N/T in her hands in awhile.  -LN     STG 6 Pt to report improved tolerance to sitting at work on the computer with decreased neck pain reported.  -LN     STG 6 Progress Met  -LN     STG 6 Progress Comments She reports improved tolerance to this  -LN        Long Term Goals    LTG 1 Pt to verbally report decreased neck pain to <3/10 with ADLs and everyday activities (including work/computer activities).  -LN     LTG 1 Progress Partially Met  -LN     LTG 1 Progress Comments She rated pain at 2/10 at rest and 3/10 with movement today.  -LN     LTG 2 CROM WFL & painfree all planes.  -LN     LTG 2 Progress Partially Met  -LN     LTG 2 Progress Comments Cervical flexion, extension, & R rotation all WFL but \"bruising\" reported with flexion and \"pinching\"reported with extension.  -LN     LTG 3 Muscle guarding decreased to nominal B UT/levator/MT/rhomboids/scalenes.  -LN     LTG 3 Progress Not Met  -LN     LTG 3 Progress Comments Minimal mm guarding persists lefty in B UT/levator/MT  -LN     LTG 4 Pt to no longer report any N/T in hands.  -LN     LTG 4 Progress Met  -LN     LTG 4 Progress Comments She reports that she hasn't felt any N/T in her hands in awhile.  -LN     LTG 5 Pt independent with more advanced HEP issued by therapist.  -LN     LTG 5 Progress Met  -LN               User Key  (r) = Recorded By, (t) = Taken By, (c) = Cosigned By      Initials Name Provider Type    Bren Rodriguez, PT Physical Therapist                    Therapy Education  Education Details: Pt to continue with HEP and use MH/CP/Home TENS unit as " needed.  Given: HEP, Symptoms/condition management, Posture/body mechanics  Program: Reinforced  How Provided: Verbal  Provided to: Patient  Level of Understanding: Verbalized              Time Calculation:   Start Time: 1400  Stop Time: 1500  Time Calculation (min): 60 min  Untimed Charges  PT Traction Rx Minutes: 20  PT E-Stim Unattended Minutes: 15  PT Moist Heat Minutes:  (15)  Total Minutes  Untimed Charges Total Minutes: 35   Total Minutes: 35  Therapy Charges for Today       Code Description Service Date Service Provider Modifiers Qty    48804287974 HC PT ELECTRICAL STIM UNATTENDED 4/2/2025 Bren Shepherd, PT  1    30610851497 HC PT TRACTION CERVICAL 4/2/2025 Bren Shepherd, PT GP 1                      Bren Shepherd, PT  4/2/2025

## 2025-06-20 ENCOUNTER — TELEPHONE (OUTPATIENT)
Dept: OBSTETRICS AND GYNECOLOGY | Facility: CLINIC | Age: 55
End: 2025-06-20

## 2025-06-20 NOTE — TELEPHONE ENCOUNTER
"Provider: DR. GUTIERREZ    Caller: Jocelyn Mckeon \"Jocelyn Jensen\"    Relationship to Patient: Self    Pharmacy:     Phone Number: [phone]    Reason for Call: SEE CHART FROM 12/24 ER VISIT AND DR. DE LA O F/U'S - SAYS SHE IS HAVING SAME SYMPTOMS STARTED BACK ABOUT 3 DAYS AGO, INCL, PELVIC PAIN, FEELS AS IF UTERUS IS SWOLLEN INTERNALLY, NOT SURE IF SHE NEEDS U/S OR NOT. THE LEFT SIDE OF VAGINA FEELS LIKE THERE IS A LUMP THERE AND THAT'S WHAT HAPPENED LAST TIME.    When was the patient last seen: 12/24    When did it start: 3 DAYS      "

## 2025-06-22 ENCOUNTER — APPOINTMENT (OUTPATIENT)
Dept: CT IMAGING | Facility: HOSPITAL | Age: 55
End: 2025-06-22
Payer: COMMERCIAL

## 2025-06-22 ENCOUNTER — HOSPITAL ENCOUNTER (EMERGENCY)
Facility: HOSPITAL | Age: 55
Discharge: HOME | End: 2025-06-22
Attending: STUDENT IN AN ORGANIZED HEALTH CARE EDUCATION/TRAINING PROGRAM | Admitting: STUDENT IN AN ORGANIZED HEALTH CARE EDUCATION/TRAINING PROGRAM
Payer: COMMERCIAL

## 2025-06-22 VITALS
HEIGHT: 57 IN | RESPIRATION RATE: 20 BRPM | DIASTOLIC BLOOD PRESSURE: 88 MMHG | OXYGEN SATURATION: 99 % | HEART RATE: 64 BPM | WEIGHT: 110 LBS | BODY MASS INDEX: 23.73 KG/M2 | SYSTOLIC BLOOD PRESSURE: 147 MMHG | TEMPERATURE: 98 F

## 2025-06-22 DIAGNOSIS — R74.8 SERUM LIPASE ELEVATION: ICD-10-CM

## 2025-06-22 DIAGNOSIS — K56.7 ILEUS: Primary | ICD-10-CM

## 2025-06-22 LAB
ALBUMIN SERPL-MCNC: 4.4 G/DL (ref 3.5–5.2)
ALBUMIN/GLOB SERPL: 1.3 G/DL
ALP SERPL-CCNC: 81 U/L (ref 39–117)
ALT SERPL W P-5'-P-CCNC: 20 U/L (ref 1–33)
ANION GAP SERPL CALCULATED.3IONS-SCNC: 11 MMOL/L (ref 5–15)
AST SERPL-CCNC: 26 U/L (ref 1–32)
BASOPHILS # BLD AUTO: 0.06 10*3/MM3 (ref 0–0.2)
BASOPHILS NFR BLD AUTO: 1 % (ref 0–1.5)
BILIRUB SERPL-MCNC: 0.2 MG/DL (ref 0–1.2)
BUN SERPL-MCNC: 21 MG/DL (ref 6–20)
BUN/CREAT SERPL: 23.1 (ref 7–25)
CALCIUM SPEC-SCNC: 9.3 MG/DL (ref 8.6–10.5)
CHLORIDE SERPL-SCNC: 106 MMOL/L (ref 98–107)
CO2 SERPL-SCNC: 25 MMOL/L (ref 22–29)
CREAT SERPL-MCNC: 0.91 MG/DL (ref 0.57–1)
DEPRECATED RDW RBC AUTO: 42.1 FL (ref 37–54)
EGFRCR SERPLBLD CKD-EPI 2021: 75.6 ML/MIN/1.73
EOSINOPHIL # BLD AUTO: 0.18 10*3/MM3 (ref 0–0.4)
EOSINOPHIL NFR BLD AUTO: 3 % (ref 0.3–6.2)
ERYTHROCYTE [DISTWIDTH] IN BLOOD BY AUTOMATED COUNT: 13.6 % (ref 12.3–15.4)
GLOBULIN UR ELPH-MCNC: 3.5 GM/DL
GLUCOSE SERPL-MCNC: 102 MG/DL (ref 65–99)
HCT VFR BLD AUTO: 37.4 % (ref 34–46.6)
HGB BLD-MCNC: 12.5 G/DL (ref 12–15.9)
HOLD SPECIMEN: NORMAL
HOLD SPECIMEN: NORMAL
IMM GRANULOCYTES # BLD AUTO: 0 10*3/MM3 (ref 0–0.05)
IMM GRANULOCYTES NFR BLD AUTO: 0 % (ref 0–0.5)
LIPASE SERPL-CCNC: 61 U/L (ref 13–60)
LYMPHOCYTES # BLD AUTO: 2.28 10*3/MM3 (ref 0.7–3.1)
LYMPHOCYTES NFR BLD AUTO: 38.4 % (ref 19.6–45.3)
MCH RBC QN AUTO: 28.1 PG (ref 26.6–33)
MCHC RBC AUTO-ENTMCNC: 33.4 G/DL (ref 31.5–35.7)
MCV RBC AUTO: 84 FL (ref 79–97)
MONOCYTES # BLD AUTO: 0.47 10*3/MM3 (ref 0.1–0.9)
MONOCYTES NFR BLD AUTO: 7.9 % (ref 5–12)
NEUTROPHILS NFR BLD AUTO: 2.94 10*3/MM3 (ref 1.7–7)
NEUTROPHILS NFR BLD AUTO: 49.7 % (ref 42.7–76)
NRBC BLD AUTO-RTO: 0 /100 WBC (ref 0–0.2)
PLATELET # BLD AUTO: 236 10*3/MM3 (ref 140–450)
PMV BLD AUTO: 10.2 FL (ref 6–12)
POTASSIUM SERPL-SCNC: 4 MMOL/L (ref 3.5–5.2)
PROT SERPL-MCNC: 7.9 G/DL (ref 6–8.5)
RBC # BLD AUTO: 4.45 10*6/MM3 (ref 3.77–5.28)
SODIUM SERPL-SCNC: 142 MMOL/L (ref 136–145)
WBC NRBC COR # BLD AUTO: 5.93 10*3/MM3 (ref 3.4–10.8)
WHOLE BLOOD HOLD COAG: NORMAL
WHOLE BLOOD HOLD SPECIMEN: NORMAL

## 2025-06-22 PROCEDURE — 80053 COMPREHEN METABOLIC PANEL: CPT | Performed by: STUDENT IN AN ORGANIZED HEALTH CARE EDUCATION/TRAINING PROGRAM

## 2025-06-22 PROCEDURE — 74177 CT ABD & PELVIS W/CONTRAST: CPT

## 2025-06-22 PROCEDURE — 25010000002 MORPHINE PER 10 MG: Performed by: STUDENT IN AN ORGANIZED HEALTH CARE EDUCATION/TRAINING PROGRAM

## 2025-06-22 PROCEDURE — 96375 TX/PRO/DX INJ NEW DRUG ADDON: CPT

## 2025-06-22 PROCEDURE — 96374 THER/PROPH/DIAG INJ IV PUSH: CPT

## 2025-06-22 PROCEDURE — 25810000003 SODIUM CHLORIDE 0.9 % SOLUTION: Performed by: STUDENT IN AN ORGANIZED HEALTH CARE EDUCATION/TRAINING PROGRAM

## 2025-06-22 PROCEDURE — 25510000001 IOPAMIDOL PER 1 ML: Performed by: STUDENT IN AN ORGANIZED HEALTH CARE EDUCATION/TRAINING PROGRAM

## 2025-06-22 PROCEDURE — 99285 EMERGENCY DEPT VISIT HI MDM: CPT | Performed by: STUDENT IN AN ORGANIZED HEALTH CARE EDUCATION/TRAINING PROGRAM

## 2025-06-22 PROCEDURE — 83690 ASSAY OF LIPASE: CPT | Performed by: STUDENT IN AN ORGANIZED HEALTH CARE EDUCATION/TRAINING PROGRAM

## 2025-06-22 PROCEDURE — 25010000002 ONDANSETRON PER 1 MG: Performed by: STUDENT IN AN ORGANIZED HEALTH CARE EDUCATION/TRAINING PROGRAM

## 2025-06-22 PROCEDURE — 85025 COMPLETE CBC W/AUTO DIFF WBC: CPT | Performed by: STUDENT IN AN ORGANIZED HEALTH CARE EDUCATION/TRAINING PROGRAM

## 2025-06-22 RX ORDER — SODIUM CHLORIDE 0.9 % (FLUSH) 0.9 %
10 SYRINGE (ML) INJECTION AS NEEDED
Status: DISCONTINUED | OUTPATIENT
Start: 2025-06-22 | End: 2025-06-22 | Stop reason: HOSPADM

## 2025-06-22 RX ORDER — ONDANSETRON 2 MG/ML
4 INJECTION INTRAMUSCULAR; INTRAVENOUS ONCE
Status: COMPLETED | OUTPATIENT
Start: 2025-06-22 | End: 2025-06-22

## 2025-06-22 RX ORDER — METOCLOPRAMIDE 10 MG/1
10 TABLET ORAL ONCE
Status: COMPLETED | OUTPATIENT
Start: 2025-06-22 | End: 2025-06-22

## 2025-06-22 RX ORDER — IOPAMIDOL 755 MG/ML
100 INJECTION, SOLUTION INTRAVASCULAR
Status: COMPLETED | OUTPATIENT
Start: 2025-06-22 | End: 2025-06-22

## 2025-06-22 RX ORDER — METOCLOPRAMIDE 5 MG/1
5 TABLET ORAL 3 TIMES DAILY
Qty: 21 TABLET | Refills: 0 | Status: SHIPPED | OUTPATIENT
Start: 2025-06-22 | End: 2025-06-29

## 2025-06-22 RX ADMIN — METOCLOPRAMIDE 10 MG: 10 TABLET ORAL at 03:13

## 2025-06-22 RX ADMIN — SODIUM CHLORIDE 1000 ML: 9 INJECTION, SOLUTION INTRAVENOUS at 03:12

## 2025-06-22 RX ADMIN — MORPHINE SULFATE 4 MG: 4 INJECTION, SOLUTION INTRAMUSCULAR; INTRAVENOUS at 02:04

## 2025-06-22 RX ADMIN — ONDANSETRON 4 MG: 2 INJECTION INTRAMUSCULAR; INTRAVENOUS at 02:03

## 2025-06-22 RX ADMIN — IOPAMIDOL 100 ML: 755 INJECTION, SOLUTION INTRAVENOUS at 02:34

## 2025-06-22 NOTE — Clinical Note
[facility] EMERGENCY DEPARTMENT  [address]  [place] [address]  Phone: [phone]    Jocelyn Mckeon was seen and treated in our emergency department on 6/22/2025.  She may return to work on 06/25/2025.         Thank you for choosing [facility].    Siddhartha Betts, DO

## 2025-06-22 NOTE — ED PROVIDER NOTES
Subjective   History of Present Illness  This is a healthy 54-year-old who presents to the emergency department with periumbilical pain that is moving to the right lower quadrant that began about 24 hours ago but worsened about 5 or 6 PM this evening.She states that it hurts to move, she denies any vomiting or diarrhea.  She denies any fever.      Review of Systems   Constitutional:  Negative for activity change, appetite change, diaphoresis and fatigue.   All other systems reviewed and are negative.      Past Medical History:   Diagnosis Date    Acute sinus infection     on poab    Anxiety     Arthritis     Asthma     STARTED @ 41 YRS OLD, DR. BUENO, & [facility] [place] [facility] LOPEZ    COVID-19     2021    CRPS (complex regional pain syndrome) type I of lower limb     Elevated cholesterol     Frequent UTI     GERD (gastroesophageal reflux disease)     History of  novel coronavirus disease (COVID-19) 2/15/2021    Iliotibial band syndrome affecting left lower leg     Itching     BLE has some scratches to notify MD if doesn't clear up prior to surgery    Kidney stones     Lateral collateral ligament deficiency of left knee 2016    Lateral meniscal tear     Lumbar paraspinal muscle spasm 2017    Meniscal cyst, left 2019    Migraines     Patellofemoral pain syndrome of left knee 2019    PONV (postoperative nausea and vomiting)     Seasonal allergies     Subchondral insufficiency fracture of femoral condyle, left, initial encounter 01/10/2020    Tear of lateral meniscus of left knee, current 2019       Allergies   Allergen Reactions    Meloxicam Rash and GI Intolerance     And HIVES , vomits    Shellfish-Derived Products Swelling and Angioedema       Past Surgical History:   Procedure Laterality Date     SECTION      x2    DIAGNOSTIC LAPAROSCOPY      FOOT SURGERY Right     HERNIA REPAIR Bilateral     inguinal hernia age 6    INTRAUTERINE DEVICE INSERTION      KNEE  ARTHROSCOPY Left 10/21/2016    Procedure: KNEE ARTHROSCOPY debridement of lateral meniscal tear;  Surgeon: Son Mojica MD;  Location:  [facility];  Service:     KNEE ARTHROSCOPY Left 7/27/2018    Procedure: KNEE ARTHROSCOPY;  Surgeon: Son Mojica MD;  Location:  [facility];  Service: Orthopedics    KNEE ARTHROSCOPY Left 12/2/2021    Procedure: LEFT KNEE DIAGNOSTIC  ARTHROSCOPY AND PARTLATERAL MENISECTOMY AND REMOVAL OF MULTIPLE FIXATION DEVICES;  Surgeon: Rodríguez Hamlin MD;  Location:  [facility];  Service: Orthopedics;  Laterality: Left;    KNEE MENISCAL REPAIR Left 10/21/2019    Procedure: knee arthroscopy, debridement of meniscal cyst, and lateral meniscal repair;  Surgeon: Bridger Figueroa MD;  Location:  [facility];  Service: Orthopedics    NERVE BLOCK Left 5/25/2021    Procedure: KNEE GENICULAR NERVE BLOCK UNDER FLUORO- x2, 3-4 wks apart;  Surgeon: James Spivey MD;  Location: [facility];  Service: Pain Management;  Laterality: Left;       Family History   Problem Relation Age of Onset    Heart disease Mother         ?    Cancer Father         ?    Ovarian cancer Sister 21    No Known Problems Brother         unknown whereabouts    ADD / ADHD Son     No Known Problems Son     Malig Hyperthermia Neg Hx     Breast cancer Neg Hx        Social History     Socioeconomic History    Marital status:     Number of children: 2    Years of education: college    Highest education level: Bachelor's degree (e.g., BA, AB, BS)   Tobacco Use    Smoking status: Never    Smokeless tobacco: Never   Vaping Use    Vaping status: Never Used   Substance and Sexual Activity    Alcohol use: Yes     Comment: 1-2 glasses monthly    Drug use: Never    Sexual activity: Yes     Partners: Male     Birth control/protection: Post-menopausal           Objective   Physical Exam  Vitals and nursing note reviewed.   Constitutional:       General: She is not in acute distress.     Appearance: Normal appearance. She is not  ill-appearing, toxic-appearing or diaphoretic.   HENT:      Head: Normocephalic and atraumatic.      Right Ear: Tympanic membrane and external ear normal.      Left Ear: Tympanic membrane and external ear normal.      Nose: Nose normal. No congestion or rhinorrhea.      Mouth/Throat:      Mouth: Mucous membranes are moist.   Eyes:      Conjunctiva/sclera: Conjunctivae normal.      Pupils: Pupils are equal, round, and reactive to light.   Cardiovascular:      Rate and Rhythm: Normal rate and regular rhythm.      Pulses: Normal pulses.   Pulmonary:      Effort: Pulmonary effort is normal. No respiratory distress.      Breath sounds: Normal breath sounds. No stridor. No wheezing, rhonchi or rales.   Abdominal:      General: There is no distension.      Tenderness: There is abdominal tenderness in the right lower quadrant. There is guarding. There is no right CVA tenderness or left CVA tenderness. Positive signs include McBurney's sign.   Musculoskeletal:         General: No swelling or deformity. Normal range of motion.      Cervical back: Normal range of motion and neck supple. No rigidity.   Skin:     General: Skin is warm.      Coloration: Skin is not pale.   Neurological:      General: No focal deficit present.      Mental Status: She is alert and oriented to person, place, and time. Mental status is at baseline.   Psychiatric:         Mood and Affect: Mood normal.         Thought Content: Thought content normal.         Procedures           ED Course                                                       Medical Decision Making    MDM:    Escalation of care including admission/observation considered    - Discussions of management with other providers:  None    - Discussed/reviewed with Radiology regarding test interpretation    - Independent interpretation: Labs and CT    - Additional patient history obtained from: Partner    - Review of external non-ED record (if available):  Prior Inpt record, Office record,  Outpt record, Prior Outpt labs, PCP record, Outside ED record, Other    - Chronic conditions affecting care: See HPI and medical Hx.    - Social Determinants of health significantly affecting care:  None        Medical Decision Making Discussion:    This is a 54-year-old presents to Emergency Department with periumbilical and right lower quadrant pain.  The patient is CT scan shows dilated loops of small bowel which may contain some gas, there is likely an ileus here and developing small bowel obstruction is felt to be less likely.  There is some trace free fluid in the pelvis which is physiologic reactive.  Hepatic steatosis is noted.  No evidence of appendicitis and no evidence of pancreatitis here despite his very minimally elevated lipase.  The patient given fluids, Reglan, and I will prescribe Reglan for home.  She will be discharged and I recommend follow-up with PCP return to the ED with any acute change or worsening of her condition, otherwise patient is well-appearing and stable for discharge    The patient has been given very strict return precautions to return to the emergency department should there be any acute change or worsening of their condition.  I have explained my findings and the patient has expressed understanding to me.  I explained that the work-up performed in the ED has been based on the specific complaint and concern, as the nature of emergency medicine is complaint driven and they understand that new symptoms may arise.  I have told them that, should there be any new symptoms, worsening or changing symptoms, a new work-up may be indicated that they are encouraged to return to the emergency department or promptly contact their primary care physician. We have employed a shared decision-making process as the discussion of their disposition.  The patient has been educated as to the nature of the visit, the tests and work-up performed and the findings from today's visit. At this time, there  does not appear to be any acute emergent process that necessitates admission to the hospital, however, the patient understands that this can change unexpectedly. At this time, the patient is stable for discharge home and agrees to follow-up with her primary care physician in the next 24 to 48 hours or earlier should they be able to obtain an appointment.    The patient was counseled regarding diagnostic results and treatment plan and patient has indicated understanding of these instructions.        Amount and/or Complexity of Data Reviewed  Labs: ordered.  Radiology: ordered.    Risk  Prescription drug management.        Final diagnoses:   Ileus   Serum lipase elevation       ED Disposition  ED Disposition       ED Disposition   Discharge    Condition   Good    Comment   --               Aaron Ro MD  [address] [facility] [address] [phone]               Medication List        New Prescriptions      metoclopramide 5 MG tablet  Commonly known as: REGLAN  Take 1 tablet by mouth 3 times a day for 7 days.               Where to Get Your Medications        These medications were sent to [id number] - [place], [place] - [address] [address] - [phone] [facility] [phone] [address] [address]      Phone: [phone]   metoclopramide 5 MG tablet            Siddhartha Betts, DO  06/22/25 [id number]

## 2025-06-22 NOTE — DISCHARGE INSTRUCTIONS

## 2025-06-22 NOTE — Clinical Note
[facility] EMERGENCY DEPARTMENT  [address]  [place] [address]  Phone: [phone]    Jocelyn Mckeon was seen and treated in our emergency department on 6/22/2025.  She may return to work on 06/25/2025.         Thank you for choosing [facility].    Siddhartha Betts, DO

## 2025-06-24 ENCOUNTER — OFFICE VISIT (OUTPATIENT)
Dept: FAMILY MEDICINE CLINIC | Facility: CLINIC | Age: 55
End: 2025-06-24
Payer: COMMERCIAL

## 2025-06-24 ENCOUNTER — OFFICE VISIT (OUTPATIENT)
Dept: OBSTETRICS AND GYNECOLOGY | Facility: CLINIC | Age: 55
End: 2025-06-24
Payer: COMMERCIAL

## 2025-06-24 VITALS
DIASTOLIC BLOOD PRESSURE: 68 MMHG | SYSTOLIC BLOOD PRESSURE: 100 MMHG | TEMPERATURE: 98 F | WEIGHT: 111.4 LBS | RESPIRATION RATE: 16 BRPM | HEIGHT: 57 IN | BODY MASS INDEX: 24.03 KG/M2 | HEART RATE: 80 BPM | OXYGEN SATURATION: 98 %

## 2025-06-24 VITALS
HEIGHT: 57 IN | WEIGHT: 110 LBS | BODY MASS INDEX: 23.73 KG/M2 | SYSTOLIC BLOOD PRESSURE: 102 MMHG | DIASTOLIC BLOOD PRESSURE: 74 MMHG

## 2025-06-24 DIAGNOSIS — R10.2 PELVIC PAIN: Primary | ICD-10-CM

## 2025-06-24 DIAGNOSIS — K56.7 ILEUS: ICD-10-CM

## 2025-06-24 DIAGNOSIS — Z13.89 SCREENING FOR GENITOURINARY CONDITION: ICD-10-CM

## 2025-06-24 DIAGNOSIS — K56.7 ILEUS, UNSPECIFIED: Primary | ICD-10-CM

## 2025-06-24 LAB
BILIRUB BLD-MCNC: NEGATIVE MG/DL
CLARITY, POC: CLEAR
COLOR UR: YELLOW
GLUCOSE UR STRIP-MCNC: NEGATIVE MG/DL
KETONES UR QL: NEGATIVE
LEUKOCYTE EST, POC: ABNORMAL
NITRITE UR-MCNC: NEGATIVE MG/ML
PH UR: 5 [PH] (ref 5–8)
PROT UR STRIP-MCNC: ABNORMAL MG/DL
RBC # UR STRIP: ABNORMAL /UL
SP GR UR: 1 (ref 1–1.03)
UROBILINOGEN UR QL: NORMAL

## 2025-06-24 PROCEDURE — 99213 OFFICE O/P EST LOW 20 MIN: CPT | Performed by: FAMILY MEDICINE

## 2025-06-24 RX ORDER — ONDANSETRON 8 MG/1
8 TABLET, FILM COATED ORAL EVERY 8 HOURS PRN
Qty: 30 TABLET | Refills: 0 | Status: SHIPPED | OUTPATIENT
Start: 2025-06-24

## 2025-06-24 RX ORDER — LORATADINE 10 MG/1
10 TABLET ORAL DAILY
COMMUNITY

## 2025-06-24 RX ORDER — MONTELUKAST SODIUM 10 MG/1
10 TABLET ORAL DAILY
COMMUNITY

## 2025-06-24 RX ORDER — CETIRIZINE HYDROCHLORIDE 10 MG/1
10 TABLET ORAL DAILY
COMMUNITY

## 2025-06-24 RX ORDER — AMOXICILLIN 250 MG
1 CAPSULE ORAL 2 TIMES DAILY
Qty: 30 TABLET | Refills: 1 | Status: SHIPPED | OUTPATIENT
Start: 2025-06-24

## 2025-06-24 NOTE — PROGRESS NOTES
Subjective   Jocelyn Mckeon is a 54 y.o. female who is here for   Chief Complaint   Patient presents with    Hospital Follow Up Visit     6/22 for Ileus, abdominal pain, cannot pass bowel movements   .     Abdominal Pain  Chronicity:  Recurrent  Onset:  In the past 7 days  Onset quality:  Gradual  Frequency:  Rarely  Progression since onset:  Improving  Episode duration:  Variable  Pain location:  Epigastric region, periumbilical region and suprapubic region  Pain - numeric:  4/10  Pain quality:  Aching, cramping and a sensation of fullness  Radiates to:  Epigastric region, periumbilical region and pelvis  Associated symptoms: belching, constipation, flatus and frequency    Associated symptoms: no anorexia, no arthralgias, no diarrhea, no dysuria, no fever, no hematochezia, no hematuria, no melena, no myalgias, no nausea, no vomiting and no weight loss    Aggravated by:  Palpation  Relieved by:  Certain positions, palpation, passing flatus and recumbency  Diagnostic workup:  CT scan       The following portions of the patient's history were reviewed and updated as appropriate: allergies, current medications, past family history, past medical history, past social history, past surgical history, and problem list.    Review of Systems   Constitutional:  Negative for fever and weight loss.   Gastrointestinal:  Positive for abdominal pain, constipation and flatus. Negative for anorexia, diarrhea, hematochezia, melena, nausea and vomiting.   Genitourinary:  Positive for frequency. Negative for dysuria and hematuria.   Musculoskeletal:  Negative for arthralgias and myalgias.     , Cramping and bloating for several days  Went to ER diagnosed with ileus  Some air-fluid levels in the small bowel  No BM now for several days  No nausea vomiting fever.  Urinating normally    Objective   Vitals:    06/24/25 1447   BP: 100/68   BP Location: Left arm   Patient Position: Sitting   Cuff Size: Adult   Pulse: 80   Resp: 16   Temp:  "98 °F (36.7 °C)   TempSrc: Infrared   SpO2: 98%   Weight: 50.5 kg (111 lb 6.4 oz)   Height: 145.4 cm (57.25\")      Physical Exam  Vitals reviewed.   Abdominal:      General: There is no distension.      Tenderness: There is abdominal tenderness.   Neurological:      Mental Status: She is alert.       CT Abdomen Pelvis With Contrast (06/22/2025 02:33)   Assessment & Plan   Diagnoses and all orders for this visit:    1. Ileus, unspecified (Primary)  -     ondansetron (ZOFRAN) 8 MG tablet; Take 1 tablet by mouth Every 8 (Eight) Hours As Needed for Nausea or Vomiting.  Dispense: 30 tablet; Refill: 0  -     sennosides-docusate (senna-docusate sodium) 8.6-50 MG per tablet; Take 1 tablet by mouth 2 (Two) Times a Day. Indications: Constipation  Dispense: 30 tablet; Refill: 1      There are no Patient Instructions on file for this visit.    There are no discontinued medications.     No follow-ups on file.    Dr. Aaron Ro  [facility]  [place], [place]    "

## 2025-06-24 NOTE — PROGRESS NOTES
"      Jocelyn Mckeon is a 54 y.o. patient who presents for follow up of   Chief Complaint   Patient presents with    Pelvic Pain       HPI very pleasant longtime patient of mine presents with recurrent pelvic pain.  Started about a week ago after intercourse.  [place] was very uncomfortable.  Progressively got worse until she was rushed to the ER 2 days ago.  CT scan of the abdomen pelvis was done.  It showed mildly dilated loops of bowel.  She presents today for pelvic ultrasound after she called wanting to make sure that from a gynecological standpoint it was nothing.  She had a diagnostic laparoscopy performed by me years ago.  She has no bleeding because she is postmenopausal.  Ultrasound was performed prior to this visit.    The following portions of the patient's history were reviewed and updated as appropriate: allergies, current medications and problem list.    Review of Systems   Constitutional:  Negative for appetite change, fever and unexpected weight change.   HENT:  Negative for congestion and sore throat.    Respiratory:  Negative for cough and shortness of breath.    Cardiovascular:  Negative for chest pain and palpitations.   Gastrointestinal:  Positive for abdominal distention and abdominal pain. Negative for constipation, diarrhea, nausea and vomiting.   Endocrine: Negative.    Genitourinary:  Positive for pelvic pain. Negative for dyspareunia, menstrual problem and vaginal discharge.   Skin: Negative.    Neurological:  Negative for dizziness and syncope.   Hematological: Negative.    Psychiatric/Behavioral:  Negative for dysphoric mood and sleep disturbance. The patient is not nervous/anxious.      /74   Ht 145.4 cm (57.25\")   Wt 49.9 kg (110 lb)   LMP  (LMP Unknown)   BMI 23.60 kg/m²     Physical Exam  Vitals and nursing note reviewed.   Constitutional:       Appearance: She is well-developed.   HENT:      Head: Normocephalic and atraumatic.   Pulmonary:      Effort: Pulmonary " effort is normal. No respiratory distress.   Abdominal:      Palpations: Abdomen is soft. There is no mass.      Tenderness: There is abdominal tenderness (mildly). There is no guarding or rebound.   Musculoskeletal:         General: Normal range of motion.   Skin:     General: Skin is warm and dry.   Neurological:      Mental Status: She is alert and oriented to person, place, and time.   Psychiatric:         Behavior: Behavior normal.         Thought Content: Thought content normal.         Judgment: Judgment normal.   CT scan of the abdomen pelvis from the ER reviewed with the patient.  Mildly dilated's bowel loops seen.  Everything else normal.    Images of today's ultrasound were reviewed with the patient.  Uterus is anteverted with smooth borders and contours.  Endometrial thickness is normal at 0.47 cm.  Ovaries are normal.  No free fluid in the adnexa or cul-de-sac.  Impression normal pelvic ultrasound.    Assessment/Plan    Diagnoses and all orders for this visit:    1. Pelvic pain (Primary)    2. Ileus    3. Screening for genitourinary condition  -     POC Urinalysis Dipstick    Normal pelvic ultrasound.  CT may be the etiology of early mild ileus.  Has appointment with her PCP right after this appointment today.  Okay from a gynecologic standpoint.    Return if symptoms worsen or fail to improve.    I spent 20 minutes caring for Jocelyn on this date of service. This time includes time spent by me in the following activities: preparing for the visit, reviewing tests, performing a medically appropriate examination and/or evaluation, counseling and educating the patient/family/caregiver, documenting information in the medical record, and independently interpreting results and communicating that information with the patient/family/caregiver.     Iraj Lackey MD  6/24/2025  13:33 EDT

## 2025-07-02 ENCOUNTER — OFFICE VISIT (OUTPATIENT)
Dept: FAMILY MEDICINE CLINIC | Facility: CLINIC | Age: 55
End: 2025-07-02
Payer: COMMERCIAL

## 2025-07-02 VITALS
HEIGHT: 57 IN | OXYGEN SATURATION: 96 % | SYSTOLIC BLOOD PRESSURE: 100 MMHG | WEIGHT: 109 LBS | DIASTOLIC BLOOD PRESSURE: 62 MMHG | HEART RATE: 90 BPM | BODY MASS INDEX: 23.51 KG/M2 | TEMPERATURE: 98 F

## 2025-07-02 DIAGNOSIS — K56.7 ILEUS: Primary | ICD-10-CM

## 2025-07-02 PROCEDURE — 99213 OFFICE O/P EST LOW 20 MIN: CPT | Performed by: FAMILY MEDICINE

## 2025-07-02 NOTE — PROGRESS NOTES
"  Subjective   Jocelyn Mckeon is a 54 y.o. female who is here for   Chief Complaint   Patient presents with    lleus   .     Primary Care Follow-Up  Conditions present: other    Side effects:  Change in bowel movements, fatigue and GI discomfort  Treatment compliance:  All of the time  Treatment barriers:  No complaince problems  Exercise:  Intermittently  Other:     Additional other condition information:  There are several tesults from my previous ER , OB, and your office lab/CT results that are abnormal. Also, I would like tontalk about what are my next strps about my intestinal issue. Still have difficult using the restroom wirhout taking some medicine.     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    Review of Systems    Objective   Vitals:    07/02/25 1445   BP: 100/62   BP Location: Left arm   Patient Position: Sitting   Cuff Size: Adult   Pulse: 90   Temp: 98 °F (36.7 °C)   SpO2: 96%   Weight: 49.4 kg (109 lb)   Height: 145.4 cm (57.25\")      Physical Exam  Vitals reviewed.   Abdominal:      Tenderness: There is no abdominal tenderness.   Neurological:      Mental Status: She is alert.         Assessment & Plan   Diagnoses and all orders for this visit:    1. Ileus (Primary)    Resolving ileus  Continue Senokot as needed  Just adding on Metamucil mixed with her breakfast juice every morning    There are no Patient Instructions on file for this visit.    There are no discontinued medications.     No follow-ups on file.    Dr. Aaron Ro  [facility]  [place], [place]    "

## 2025-08-21 ENCOUNTER — OFFICE VISIT (OUTPATIENT)
Dept: FAMILY MEDICINE CLINIC | Facility: CLINIC | Age: 55
End: 2025-08-21
Payer: COMMERCIAL

## 2025-08-21 VITALS
SYSTOLIC BLOOD PRESSURE: 100 MMHG | WEIGHT: 111 LBS | OXYGEN SATURATION: 98 % | HEIGHT: 57 IN | TEMPERATURE: 98.6 F | DIASTOLIC BLOOD PRESSURE: 66 MMHG | BODY MASS INDEX: 23.95 KG/M2 | HEART RATE: 86 BPM

## 2025-08-21 DIAGNOSIS — M54.50 ACUTE LOW BACK PAIN, UNSPECIFIED BACK PAIN LATERALITY, UNSPECIFIED WHETHER SCIATICA PRESENT: Primary | ICD-10-CM

## 2025-08-21 DIAGNOSIS — M54.50 ACUTE LEFT-SIDED LOW BACK PAIN WITHOUT SCIATICA: ICD-10-CM

## 2025-08-21 PROBLEM — G90.522 COMPLEX REGIONAL PAIN SYNDROME I OF LEFT LOWER LIMB: Status: RESOLVED | Noted: 2018-12-18 | Resolved: 2025-08-21

## 2025-08-21 PROBLEM — Z98.890 STATUS POST ARTHROSCOPIC SURGERY OF LEFT KNEE: Status: RESOLVED | Noted: 2020-01-10 | Resolved: 2025-08-21

## 2025-08-21 LAB
BILIRUB BLD-MCNC: NEGATIVE MG/DL
CLARITY, POC: ABNORMAL
COLOR UR: YELLOW
GLUCOSE UR STRIP-MCNC: NEGATIVE MG/DL
KETONES UR QL: NEGATIVE
LEUKOCYTE EST, POC: ABNORMAL
NITRITE UR-MCNC: NEGATIVE MG/ML
PH UR: 6 [PH] (ref 5–8)
PROT UR STRIP-MCNC: NEGATIVE MG/DL
RBC # UR STRIP: ABNORMAL /UL
SP GR UR: 1.01 (ref 1–1.03)
UROBILINOGEN UR QL: NORMAL

## 2025-08-21 PROCEDURE — 81002 URINALYSIS NONAUTO W/O SCOPE: CPT | Performed by: FAMILY MEDICINE

## 2025-08-21 PROCEDURE — 99213 OFFICE O/P EST LOW 20 MIN: CPT | Performed by: FAMILY MEDICINE

## 2025-08-21 RX ORDER — IBUPROFEN 800 MG/1
800 TABLET, FILM COATED ORAL 3 TIMES DAILY PRN
Qty: 90 TABLET | Refills: 0 | Status: SHIPPED | OUTPATIENT
Start: 2025-08-21 | End: 2025-09-20